# Patient Record
Sex: FEMALE | Race: WHITE | NOT HISPANIC OR LATINO | Employment: FULL TIME | URBAN - METROPOLITAN AREA
[De-identification: names, ages, dates, MRNs, and addresses within clinical notes are randomized per-mention and may not be internally consistent; named-entity substitution may affect disease eponyms.]

---

## 2017-07-21 ENCOUNTER — APPOINTMENT (OUTPATIENT)
Dept: WOMENS IMAGING | Facility: HOSPITAL | Age: 52
End: 2017-07-21

## 2017-07-21 PROCEDURE — 77067 SCR MAMMO BI INCL CAD: CPT | Performed by: RADIOLOGY

## 2017-08-23 ENCOUNTER — HOSPITAL ENCOUNTER (INPATIENT)
Facility: HOSPITAL | Age: 52
LOS: 3 days | Discharge: HOME | End: 2017-08-27
Attending: EMERGENCY MEDICINE | Admitting: INTERNAL MEDICINE

## 2017-08-23 DIAGNOSIS — E10.10 DIABETIC KETOACIDOSIS WITHOUT COMA ASSOCIATED WITH TYPE 1 DIABETES MELLITUS (HCC): Primary | ICD-10-CM

## 2017-08-23 DIAGNOSIS — R11.2 NON-INTRACTABLE VOMITING WITH NAUSEA, UNSPECIFIED VOMITING TYPE: ICD-10-CM

## 2017-08-23 PROCEDURE — 85025 COMPLETE CBC W/AUTO DIFF WBC: CPT | Performed by: EMERGENCY MEDICINE

## 2017-08-23 PROCEDURE — 83036 HEMOGLOBIN GLYCOSYLATED A1C: CPT | Performed by: INTERNAL MEDICINE

## 2017-08-23 PROCEDURE — 82010 KETONE BODYS QUAN: CPT | Performed by: EMERGENCY MEDICINE

## 2017-08-23 PROCEDURE — 25010000002 ONDANSETRON PER 1 MG: Performed by: EMERGENCY MEDICINE

## 2017-08-23 PROCEDURE — 81001 URINALYSIS AUTO W/SCOPE: CPT | Performed by: EMERGENCY MEDICINE

## 2017-08-23 PROCEDURE — 80053 COMPREHEN METABOLIC PANEL: CPT | Performed by: EMERGENCY MEDICINE

## 2017-08-23 PROCEDURE — 80307 DRUG TEST PRSMV CHEM ANLYZR: CPT | Performed by: EMERGENCY MEDICINE

## 2017-08-23 PROCEDURE — 83690 ASSAY OF LIPASE: CPT | Performed by: EMERGENCY MEDICINE

## 2017-08-23 PROCEDURE — 99285 EMERGENCY DEPT VISIT HI MDM: CPT

## 2017-08-23 RX ORDER — AMITRIPTYLINE HYDROCHLORIDE 25 MG/1
50 TABLET, FILM COATED ORAL NIGHTLY
COMMUNITY
End: 2017-12-14

## 2017-08-23 RX ORDER — SODIUM CHLORIDE 0.9 % (FLUSH) 0.9 %
10 SYRINGE (ML) INJECTION AS NEEDED
Status: DISCONTINUED | OUTPATIENT
Start: 2017-08-23 | End: 2017-08-27 | Stop reason: HOSPADM

## 2017-08-23 RX ORDER — ONDANSETRON 2 MG/ML
4 INJECTION INTRAMUSCULAR; INTRAVENOUS ONCE
Status: COMPLETED | OUTPATIENT
Start: 2017-08-23 | End: 2017-08-23

## 2017-08-23 RX ADMIN — ONDANSETRON 4 MG: 2 INJECTION INTRAMUSCULAR; INTRAVENOUS at 23:50

## 2017-08-23 RX ADMIN — SODIUM CHLORIDE 1905 ML: 9 INJECTION, SOLUTION INTRAVENOUS at 23:50

## 2017-08-24 ENCOUNTER — APPOINTMENT (OUTPATIENT)
Dept: CT IMAGING | Facility: HOSPITAL | Age: 52
End: 2017-08-24

## 2017-08-24 PROBLEM — E10.10 DIABETIC KETOACIDOSIS WITHOUT COMA ASSOCIATED WITH TYPE 1 DIABETES MELLITUS (HCC): Status: ACTIVE | Noted: 2017-08-24

## 2017-08-24 LAB
ALBUMIN SERPL-MCNC: 4.7 G/DL (ref 3.5–5.2)
ALBUMIN/GLOB SERPL: 1.5 G/DL
ALP SERPL-CCNC: 163 U/L (ref 39–117)
ALT SERPL W P-5'-P-CCNC: 16 U/L (ref 1–33)
ANION GAP SERPL CALCULATED.3IONS-SCNC: 11.3 MMOL/L
ANION GAP SERPL CALCULATED.3IONS-SCNC: 12 MMOL/L
ANION GAP SERPL CALCULATED.3IONS-SCNC: 17.9 MMOL/L
ANION GAP SERPL CALCULATED.3IONS-SCNC: 31 MMOL/L
ANION GAP SERPL CALCULATED.3IONS-SCNC: 37 MMOL/L
ARTERIAL PATENCY WRIST A: ABNORMAL
AST SERPL-CCNC: 17 U/L (ref 1–32)
ATMOSPHERIC PRESS: 751.4 MMHG
B-OH-BUTYR SERPL-SCNC: 7.97 MMOL/L (ref 0.02–0.27)
BACTERIA UR QL AUTO: NORMAL /HPF
BASE EXCESS BLDA CALC-SCNC: -14.3 MMOL/L (ref 0–2)
BASOPHILS # BLD AUTO: 0.01 10*3/MM3 (ref 0–0.2)
BASOPHILS NFR BLD AUTO: 0.1 % (ref 0–1.5)
BDY SITE: ABNORMAL
BILIRUB SERPL-MCNC: 0.8 MG/DL (ref 0.1–1.2)
BILIRUB UR QL STRIP: NEGATIVE
BUN BLD-MCNC: 13 MG/DL (ref 6–20)
BUN BLD-MCNC: 14 MG/DL (ref 6–20)
BUN BLD-MCNC: 17 MG/DL (ref 6–20)
BUN BLD-MCNC: 23 MG/DL (ref 6–20)
BUN BLD-MCNC: 29 MG/DL (ref 6–20)
BUN/CREAT SERPL: 15.7 (ref 7–25)
BUN/CREAT SERPL: 16.9 (ref 7–25)
BUN/CREAT SERPL: 19.3 (ref 7–25)
BUN/CREAT SERPL: 21.3 (ref 7–25)
BUN/CREAT SERPL: 21.5 (ref 7–25)
CA-I BLD-MCNC: 5.1 MG/DL (ref 4.6–5.4)
CA-I BLD-MCNC: 5.2 MG/DL (ref 4.6–5.4)
CA-I BLD-MCNC: 5.2 MG/DL (ref 4.6–5.4)
CA-I BLD-MCNC: 5.3 MG/DL (ref 4.6–5.4)
CA-I SERPL ISE-MCNC: 1.28 MMOL/L (ref 1.1–1.35)
CA-I SERPL ISE-MCNC: 1.29 MMOL/L (ref 1.1–1.35)
CA-I SERPL ISE-MCNC: 1.3 MMOL/L (ref 1.1–1.35)
CA-I SERPL ISE-MCNC: 1.33 MMOL/L (ref 1.1–1.35)
CALCIUM SPEC-SCNC: 10.6 MG/DL (ref 8.6–10.5)
CALCIUM SPEC-SCNC: 8.5 MG/DL (ref 8.6–10.5)
CALCIUM SPEC-SCNC: 8.5 MG/DL (ref 8.6–10.5)
CALCIUM SPEC-SCNC: 8.6 MG/DL (ref 8.6–10.5)
CALCIUM SPEC-SCNC: 8.9 MG/DL (ref 8.6–10.5)
CHLORIDE SERPL-SCNC: 100 MMOL/L (ref 98–107)
CHLORIDE SERPL-SCNC: 83 MMOL/L (ref 98–107)
CHLORIDE SERPL-SCNC: 95 MMOL/L (ref 98–107)
CHLORIDE SERPL-SCNC: 98 MMOL/L (ref 98–107)
CHLORIDE SERPL-SCNC: 99 MMOL/L (ref 98–107)
CLARITY UR: CLEAR
CO2 SERPL-SCNC: 12 MMOL/L (ref 22–29)
CO2 SERPL-SCNC: 15.1 MMOL/L (ref 22–29)
CO2 SERPL-SCNC: 18.7 MMOL/L (ref 22–29)
CO2 SERPL-SCNC: 19 MMOL/L (ref 22–29)
CO2 SERPL-SCNC: 8 MMOL/L (ref 22–29)
COLOR UR: YELLOW
CREAT BLD-MCNC: 0.83 MG/DL (ref 0.57–1)
CREAT BLD-MCNC: 0.83 MG/DL (ref 0.57–1)
CREAT BLD-MCNC: 0.88 MG/DL (ref 0.57–1)
CREAT BLD-MCNC: 1.08 MG/DL (ref 0.57–1)
CREAT BLD-MCNC: 1.35 MG/DL (ref 0.57–1)
DEPRECATED RDW RBC AUTO: 45.8 FL (ref 37–54)
EOSINOPHIL # BLD AUTO: 0 10*3/MM3 (ref 0–0.7)
EOSINOPHIL NFR BLD AUTO: 0 % (ref 0.3–6.2)
ERYTHROCYTE [DISTWIDTH] IN BLOOD BY AUTOMATED COUNT: 12.9 % (ref 11.7–13)
ETHANOL BLD-MCNC: <10 MG/DL (ref 0–10)
ETHANOL UR QL: <0.01 %
GFR SERPL CREATININE-BSD FRML MDRD: 41 ML/MIN/1.73
GFR SERPL CREATININE-BSD FRML MDRD: 53 ML/MIN/1.73
GFR SERPL CREATININE-BSD FRML MDRD: 67 ML/MIN/1.73
GFR SERPL CREATININE-BSD FRML MDRD: 72 ML/MIN/1.73
GFR SERPL CREATININE-BSD FRML MDRD: 72 ML/MIN/1.73
GGT SERPL-CCNC: 10 U/L (ref 5–36)
GLOBULIN UR ELPH-MCNC: 3.2 GM/DL
GLUCOSE BLD-MCNC: 178 MG/DL (ref 65–99)
GLUCOSE BLD-MCNC: 216 MG/DL (ref 65–99)
GLUCOSE BLD-MCNC: 270 MG/DL (ref 65–99)
GLUCOSE BLD-MCNC: 428 MG/DL (ref 65–99)
GLUCOSE BLD-MCNC: 678 MG/DL (ref 65–99)
GLUCOSE BLDC GLUCOMTR-MCNC: 176 MG/DL (ref 70–130)
GLUCOSE BLDC GLUCOMTR-MCNC: 185 MG/DL (ref 70–130)
GLUCOSE BLDC GLUCOMTR-MCNC: 190 MG/DL (ref 70–130)
GLUCOSE BLDC GLUCOMTR-MCNC: 194 MG/DL (ref 70–130)
GLUCOSE BLDC GLUCOMTR-MCNC: 195 MG/DL (ref 70–130)
GLUCOSE BLDC GLUCOMTR-MCNC: 206 MG/DL (ref 70–130)
GLUCOSE BLDC GLUCOMTR-MCNC: 214 MG/DL (ref 70–130)
GLUCOSE BLDC GLUCOMTR-MCNC: 238 MG/DL (ref 70–130)
GLUCOSE BLDC GLUCOMTR-MCNC: 241 MG/DL (ref 70–130)
GLUCOSE BLDC GLUCOMTR-MCNC: 257 MG/DL (ref 70–130)
GLUCOSE BLDC GLUCOMTR-MCNC: 297 MG/DL (ref 70–130)
GLUCOSE BLDC GLUCOMTR-MCNC: 316 MG/DL (ref 70–130)
GLUCOSE BLDC GLUCOMTR-MCNC: 320 MG/DL (ref 70–130)
GLUCOSE BLDC GLUCOMTR-MCNC: 488 MG/DL (ref 70–130)
GLUCOSE BLDC GLUCOMTR-MCNC: 489 MG/DL (ref 70–130)
GLUCOSE BLDC GLUCOMTR-MCNC: 592 MG/DL (ref 70–130)
GLUCOSE UR STRIP-MCNC: ABNORMAL MG/DL
HBA1C MFR BLD: 8.39 % (ref 4.8–5.6)
HCO3 BLDA-SCNC: 11.2 MMOL/L (ref 22–28)
HCT VFR BLD AUTO: 40.3 % (ref 35.6–45.5)
HGB BLD-MCNC: 13 G/DL (ref 11.9–15.5)
HGB UR QL STRIP.AUTO: ABNORMAL
HOLD SPECIMEN: NORMAL
HOLD SPECIMEN: NORMAL
HYALINE CASTS UR QL AUTO: NORMAL /LPF
IMM GRANULOCYTES # BLD: 0.06 10*3/MM3 (ref 0–0.03)
IMM GRANULOCYTES NFR BLD: 0.4 % (ref 0–0.5)
KETONES UR QL STRIP: ABNORMAL
LEUKOCYTE ESTERASE UR QL STRIP.AUTO: NEGATIVE
LIPASE SERPL-CCNC: 12 U/L (ref 13–60)
LYMPHOCYTES # BLD AUTO: 0.61 10*3/MM3 (ref 0.9–4.8)
LYMPHOCYTES NFR BLD AUTO: 3.6 % (ref 19.6–45.3)
MAGNESIUM SERPL-MCNC: 2.1 MG/DL (ref 1.6–2.6)
MAGNESIUM SERPL-MCNC: 2.1 MG/DL (ref 1.6–2.6)
MAGNESIUM SERPL-MCNC: 2.2 MG/DL (ref 1.6–2.6)
MAGNESIUM SERPL-MCNC: 2.3 MG/DL (ref 1.6–2.6)
MCH RBC QN AUTO: 31.4 PG (ref 26.9–32)
MCHC RBC AUTO-ENTMCNC: 32.3 G/DL (ref 32.4–36.3)
MCV RBC AUTO: 97.3 FL (ref 80.5–98.2)
MODALITY: ABNORMAL
MONOCYTES # BLD AUTO: 0.51 10*3/MM3 (ref 0.2–1.2)
MONOCYTES NFR BLD AUTO: 3 % (ref 5–12)
NEUTROPHILS # BLD AUTO: 15.86 10*3/MM3 (ref 1.9–8.1)
NEUTROPHILS NFR BLD AUTO: 92.9 % (ref 42.7–76)
NITRITE UR QL STRIP: NEGATIVE
NRBC BLD MANUAL-RTO: 0 /100 WBC (ref 0–0)
PCO2 BLDA: 25.3 MM HG (ref 35–45)
PH BLDA: 7.25 PH UNITS (ref 7.35–7.45)
PH UR STRIP.AUTO: <=5 [PH] (ref 5–8)
PHOSPHATE SERPL-MCNC: 1.7 MG/DL (ref 2.5–4.5)
PHOSPHATE SERPL-MCNC: 2.1 MG/DL (ref 2.5–4.5)
PHOSPHATE SERPL-MCNC: 2.1 MG/DL (ref 2.5–4.5)
PHOSPHATE SERPL-MCNC: 3.8 MG/DL (ref 2.5–4.5)
PLATELET # BLD AUTO: 340 10*3/MM3 (ref 140–500)
PMV BLD AUTO: 12.3 FL (ref 6–12)
PO2 BLDA: 108 MM HG (ref 80–100)
POTASSIUM BLD-SCNC: 4.3 MMOL/L (ref 3.5–5.2)
POTASSIUM BLD-SCNC: 4.3 MMOL/L (ref 3.5–5.2)
POTASSIUM BLD-SCNC: 4.4 MMOL/L (ref 3.5–5.2)
POTASSIUM BLD-SCNC: 4.5 MMOL/L (ref 3.5–5.2)
POTASSIUM BLD-SCNC: 4.9 MMOL/L (ref 3.5–5.2)
PROT SERPL-MCNC: 7.9 G/DL (ref 6–8.5)
PROT UR QL STRIP: NEGATIVE
RBC # BLD AUTO: 4.14 10*6/MM3 (ref 3.9–5.2)
RBC # UR: NORMAL /HPF
REF LAB TEST METHOD: NORMAL
SAO2 % BLDCOA: 97.4 % (ref 92–99)
SET MECH RESP RATE: 18
SODIUM BLD-SCNC: 129 MMOL/L (ref 136–145)
SODIUM BLD-SCNC: 130 MMOL/L (ref 136–145)
SODIUM BLD-SCNC: 132 MMOL/L (ref 136–145)
SODIUM BLD-SCNC: 132 MMOL/L (ref 136–145)
SODIUM BLD-SCNC: 134 MMOL/L (ref 136–145)
SP GR UR STRIP: 1.03 (ref 1–1.03)
SQUAMOUS #/AREA URNS HPF: NORMAL /HPF
T4 FREE SERPL-MCNC: 0.79 NG/DL (ref 0.93–1.7)
TOTAL RATE: 18 BREATHS/MINUTE
TSH SERPL DL<=0.05 MIU/L-ACNC: 2.9 MIU/ML (ref 0.27–4.2)
UROBILINOGEN UR QL STRIP: ABNORMAL
WBC NRBC COR # BLD: 17.05 10*3/MM3 (ref 4.5–10.7)
WBC UR QL AUTO: NORMAL /HPF
WHOLE BLOOD HOLD SPECIMEN: NORMAL
WHOLE BLOOD HOLD SPECIMEN: NORMAL

## 2017-08-24 PROCEDURE — 0 IOPAMIDOL 61 % SOLUTION: Performed by: EMERGENCY MEDICINE

## 2017-08-24 PROCEDURE — 25810000003 POTASSIUM CHLORIDE PER 2 MEQ: Performed by: EMERGENCY MEDICINE

## 2017-08-24 PROCEDURE — 36600 WITHDRAWAL OF ARTERIAL BLOOD: CPT

## 2017-08-24 PROCEDURE — 84100 ASSAY OF PHOSPHORUS: CPT | Performed by: EMERGENCY MEDICINE

## 2017-08-24 PROCEDURE — 63710000001 INSULIN ASPART PER 5 UNITS: Performed by: INTERNAL MEDICINE

## 2017-08-24 PROCEDURE — 82803 BLOOD GASES ANY COMBINATION: CPT

## 2017-08-24 PROCEDURE — 74177 CT ABD & PELVIS W/CONTRAST: CPT

## 2017-08-24 PROCEDURE — 63710000001 INSULIN DETEMER PER 5 UNITS: Performed by: INTERNAL MEDICINE

## 2017-08-24 PROCEDURE — 36415 COLL VENOUS BLD VENIPUNCTURE: CPT | Performed by: EMERGENCY MEDICINE

## 2017-08-24 PROCEDURE — 82977 ASSAY OF GGT: CPT | Performed by: INTERNAL MEDICINE

## 2017-08-24 PROCEDURE — 84439 ASSAY OF FREE THYROXINE: CPT | Performed by: INTERNAL MEDICINE

## 2017-08-24 PROCEDURE — 82962 GLUCOSE BLOOD TEST: CPT

## 2017-08-24 PROCEDURE — 99254 IP/OBS CNSLTJ NEW/EST MOD 60: CPT | Performed by: INTERNAL MEDICINE

## 2017-08-24 PROCEDURE — 63710000001 INSULIN REGULAR HUMAN PER 5 UNITS: Performed by: EMERGENCY MEDICINE

## 2017-08-24 PROCEDURE — 80048 BASIC METABOLIC PNL TOTAL CA: CPT | Performed by: EMERGENCY MEDICINE

## 2017-08-24 PROCEDURE — 82330 ASSAY OF CALCIUM: CPT | Performed by: EMERGENCY MEDICINE

## 2017-08-24 PROCEDURE — 83735 ASSAY OF MAGNESIUM: CPT | Performed by: EMERGENCY MEDICINE

## 2017-08-24 PROCEDURE — 84443 ASSAY THYROID STIM HORMONE: CPT | Performed by: INTERNAL MEDICINE

## 2017-08-24 RX ORDER — POTASSIUM CHLORIDE 7.46 G/1000ML
10 INJECTION, SOLUTION INTRAVENOUS
Status: DISCONTINUED | OUTPATIENT
Start: 2017-08-24 | End: 2017-08-27 | Stop reason: HOSPADM

## 2017-08-24 RX ORDER — NICOTINE POLACRILEX 4 MG
15 LOZENGE BUCCAL
Status: DISCONTINUED | OUTPATIENT
Start: 2017-08-24 | End: 2017-08-27 | Stop reason: HOSPADM

## 2017-08-24 RX ORDER — DEXTROSE MONOHYDRATE 25 G/50ML
25 INJECTION, SOLUTION INTRAVENOUS
Status: DISCONTINUED | OUTPATIENT
Start: 2017-08-24 | End: 2017-08-27 | Stop reason: HOSPADM

## 2017-08-24 RX ORDER — ACETAMINOPHEN 325 MG/1
650 TABLET ORAL EVERY 6 HOURS PRN
Status: DISCONTINUED | OUTPATIENT
Start: 2017-08-24 | End: 2017-08-27 | Stop reason: HOSPADM

## 2017-08-24 RX ORDER — POTASSIUM CHLORIDE 1.5 G/1.77G
20 POWDER, FOR SOLUTION ORAL AS NEEDED
Status: DISCONTINUED | OUTPATIENT
Start: 2017-08-24 | End: 2017-08-24 | Stop reason: CLARIF

## 2017-08-24 RX ORDER — POTASSIUM CHLORIDE 1.5 G/1.77G
10 POWDER, FOR SOLUTION ORAL AS NEEDED
Status: DISCONTINUED | OUTPATIENT
Start: 2017-08-24 | End: 2017-08-24 | Stop reason: CLARIF

## 2017-08-24 RX ORDER — AMITRIPTYLINE HYDROCHLORIDE 50 MG/1
50 TABLET, FILM COATED ORAL NIGHTLY
Status: DISCONTINUED | OUTPATIENT
Start: 2017-08-24 | End: 2017-08-27 | Stop reason: HOSPADM

## 2017-08-24 RX ORDER — AMITRIPTYLINE HYDROCHLORIDE 100 MG/1
100 TABLET, FILM COATED ORAL NIGHTLY PRN
Status: DISCONTINUED | OUTPATIENT
Start: 2017-08-24 | End: 2017-08-24 | Stop reason: DRUGHIGH

## 2017-08-24 RX ORDER — SODIUM CHLORIDE 9 MG/ML
125 INJECTION, SOLUTION INTRAVENOUS CONTINUOUS
Status: DISCONTINUED | OUTPATIENT
Start: 2017-08-24 | End: 2017-08-24

## 2017-08-24 RX ORDER — POTASSIUM CHLORIDE 750 MG/1
40 CAPSULE, EXTENDED RELEASE ORAL AS NEEDED
Status: DISCONTINUED | OUTPATIENT
Start: 2017-08-24 | End: 2017-08-27 | Stop reason: HOSPADM

## 2017-08-24 RX ORDER — SODIUM CHLORIDE 450 MG/100ML
250 INJECTION, SOLUTION INTRAVENOUS CONTINUOUS
Status: DISCONTINUED | OUTPATIENT
Start: 2017-08-24 | End: 2017-08-24

## 2017-08-24 RX ORDER — DEXTROSE, SODIUM CHLORIDE, AND POTASSIUM CHLORIDE 5; .45; .15 G/100ML; G/100ML; G/100ML
150 INJECTION INTRAVENOUS CONTINUOUS PRN
Status: DISCONTINUED | OUTPATIENT
Start: 2017-08-24 | End: 2017-08-24

## 2017-08-24 RX ORDER — THIAMINE MONONITRATE (VIT B1) 100 MG
100 TABLET ORAL DAILY
Status: DISCONTINUED | OUTPATIENT
Start: 2017-08-24 | End: 2017-08-27 | Stop reason: HOSPADM

## 2017-08-24 RX ORDER — DEXTROSE MONOHYDRATE 25 G/50ML
12.5 INJECTION, SOLUTION INTRAVENOUS
Status: DISCONTINUED | OUTPATIENT
Start: 2017-08-24 | End: 2017-08-27 | Stop reason: HOSPADM

## 2017-08-24 RX ORDER — POTASSIUM CHLORIDE 750 MG/1
20 CAPSULE, EXTENDED RELEASE ORAL AS NEEDED
Status: DISCONTINUED | OUTPATIENT
Start: 2017-08-24 | End: 2017-08-27 | Stop reason: HOSPADM

## 2017-08-24 RX ORDER — POTASSIUM CHLORIDE 1.5 G/1.77G
40 POWDER, FOR SOLUTION ORAL AS NEEDED
Status: DISCONTINUED | OUTPATIENT
Start: 2017-08-24 | End: 2017-08-24 | Stop reason: CLARIF

## 2017-08-24 RX ORDER — POTASSIUM CHLORIDE 750 MG/1
10 CAPSULE, EXTENDED RELEASE ORAL AS NEEDED
Status: DISCONTINUED | OUTPATIENT
Start: 2017-08-24 | End: 2017-08-27 | Stop reason: HOSPADM

## 2017-08-24 RX ORDER — SODIUM CHLORIDE AND POTASSIUM CHLORIDE 150; 450 MG/100ML; MG/100ML
250 INJECTION, SOLUTION INTRAVENOUS CONTINUOUS PRN
Status: DISCONTINUED | OUTPATIENT
Start: 2017-08-24 | End: 2017-08-24

## 2017-08-24 RX ORDER — DULOXETIN HYDROCHLORIDE 60 MG/1
60 CAPSULE, DELAYED RELEASE ORAL DAILY
Status: DISCONTINUED | OUTPATIENT
Start: 2017-08-24 | End: 2017-08-27 | Stop reason: HOSPADM

## 2017-08-24 RX ORDER — DEXTROSE AND SODIUM CHLORIDE 5; .45 G/100ML; G/100ML
150 INJECTION, SOLUTION INTRAVENOUS CONTINUOUS PRN
Status: DISCONTINUED | OUTPATIENT
Start: 2017-08-24 | End: 2017-08-24

## 2017-08-24 RX ADMIN — INSULIN ASPART 17 UNITS: 100 INJECTION, SOLUTION INTRAVENOUS; SUBCUTANEOUS at 20:53

## 2017-08-24 RX ADMIN — ACETAMINOPHEN 650 MG: 325 TABLET ORAL at 10:58

## 2017-08-24 RX ADMIN — Medication 100 MG: at 09:42

## 2017-08-24 RX ADMIN — INSULIN ASPART 2 UNITS: 100 INJECTION, SOLUTION INTRAVENOUS; SUBCUTANEOUS at 17:15

## 2017-08-24 RX ADMIN — POTASSIUM CHLORIDE AND SODIUM CHLORIDE 250 ML/HR: 450; 150 INJECTION, SOLUTION INTRAVENOUS at 05:05

## 2017-08-24 RX ADMIN — INSULIN DETEMIR 30 UNITS: 100 INJECTION, SOLUTION SUBCUTANEOUS at 15:31

## 2017-08-24 RX ADMIN — POTASSIUM CHLORIDE AND SODIUM CHLORIDE 250 ML/HR: 450; 150 INJECTION, SOLUTION INTRAVENOUS at 08:34

## 2017-08-24 RX ADMIN — SODIUM CHLORIDE 125 ML/HR: 9 INJECTION, SOLUTION INTRAVENOUS at 02:06

## 2017-08-24 RX ADMIN — DULOXETINE HYDROCHLORIDE 60 MG: 60 CAPSULE, DELAYED RELEASE ORAL at 17:15

## 2017-08-24 RX ADMIN — IOPAMIDOL 85 ML: 612 INJECTION, SOLUTION INTRAVENOUS at 01:16

## 2017-08-24 RX ADMIN — POTASSIUM CHLORIDE, DEXTROSE MONOHYDRATE AND SODIUM CHLORIDE 150 ML/HR: 150; 5; 450 INJECTION, SOLUTION INTRAVENOUS at 09:41

## 2017-08-24 RX ADMIN — SODIUM CHLORIDE 1000 ML: 9 INJECTION, SOLUTION INTRAVENOUS at 01:57

## 2017-08-24 RX ADMIN — SODIUM CHLORIDE 0.1 UNITS/KG/HR: 9 INJECTION, SOLUTION INTRAVENOUS at 01:55

## 2017-08-25 PROBLEM — K76.0 FATTY INFILTRATION OF LIVER: Status: ACTIVE | Noted: 2017-08-25

## 2017-08-25 PROBLEM — Z96.41 INSULIN PUMP STATUS: Status: ACTIVE | Noted: 2017-08-25

## 2017-08-25 LAB
ANION GAP SERPL CALCULATED.3IONS-SCNC: 11.5 MMOL/L
BUN BLD-MCNC: 10 MG/DL (ref 6–20)
BUN/CREAT SERPL: 14.1 (ref 7–25)
CALCIUM SPEC-SCNC: 8.5 MG/DL (ref 8.6–10.5)
CHLORIDE SERPL-SCNC: 106 MMOL/L (ref 98–107)
CHOLEST SERPL-MCNC: 190 MG/DL (ref 0–200)
CO2 SERPL-SCNC: 23.5 MMOL/L (ref 22–29)
CREAT BLD-MCNC: 0.71 MG/DL (ref 0.57–1)
GFR SERPL CREATININE-BSD FRML MDRD: 86 ML/MIN/1.73
GLUCOSE BLD-MCNC: 70 MG/DL (ref 65–99)
GLUCOSE BLDC GLUCOMTR-MCNC: 110 MG/DL (ref 70–130)
GLUCOSE BLDC GLUCOMTR-MCNC: 188 MG/DL (ref 70–130)
GLUCOSE BLDC GLUCOMTR-MCNC: 196 MG/DL (ref 70–130)
GLUCOSE BLDC GLUCOMTR-MCNC: 56 MG/DL (ref 70–130)
HDLC SERPL-MCNC: 65 MG/DL (ref 40–60)
LDLC SERPL CALC-MCNC: 97 MG/DL (ref 0–100)
LDLC/HDLC SERPL: 1.5 {RATIO}
PHOSPHATE SERPL-MCNC: 1.9 MG/DL (ref 2.5–4.5)
POTASSIUM BLD-SCNC: 4.1 MMOL/L (ref 3.5–5.2)
POTASSIUM BLD-SCNC: 4.2 MMOL/L (ref 3.5–5.2)
SODIUM BLD-SCNC: 141 MMOL/L (ref 136–145)
TRIGL SERPL-MCNC: 139 MG/DL (ref 0–150)
VLDLC SERPL-MCNC: 27.8 MG/DL (ref 5–40)

## 2017-08-25 PROCEDURE — 87340 HEPATITIS B SURFACE AG IA: CPT | Performed by: INTERNAL MEDICINE

## 2017-08-25 PROCEDURE — 82962 GLUCOSE BLOOD TEST: CPT

## 2017-08-25 PROCEDURE — 86705 HEP B CORE ANTIBODY IGM: CPT | Performed by: INTERNAL MEDICINE

## 2017-08-25 PROCEDURE — 84100 ASSAY OF PHOSPHORUS: CPT | Performed by: INTERNAL MEDICINE

## 2017-08-25 PROCEDURE — 86709 HEPATITIS A IGM ANTIBODY: CPT | Performed by: INTERNAL MEDICINE

## 2017-08-25 PROCEDURE — 84132 ASSAY OF SERUM POTASSIUM: CPT | Performed by: INTERNAL MEDICINE

## 2017-08-25 PROCEDURE — 99232 SBSQ HOSP IP/OBS MODERATE 35: CPT | Performed by: INTERNAL MEDICINE

## 2017-08-25 PROCEDURE — 80061 LIPID PANEL: CPT | Performed by: INTERNAL MEDICINE

## 2017-08-25 PROCEDURE — 80048 BASIC METABOLIC PNL TOTAL CA: CPT | Performed by: INTERNAL MEDICINE

## 2017-08-25 RX ORDER — LOSARTAN POTASSIUM 50 MG/1
100 TABLET ORAL
Status: DISCONTINUED | OUTPATIENT
Start: 2017-08-25 | End: 2017-08-27 | Stop reason: HOSPADM

## 2017-08-25 RX ORDER — HYDROCHLOROTHIAZIDE 25 MG/1
12.5 TABLET ORAL DAILY
Status: DISCONTINUED | OUTPATIENT
Start: 2017-08-25 | End: 2017-08-27 | Stop reason: HOSPADM

## 2017-08-25 RX ADMIN — LOSARTAN POTASSIUM 100 MG: 50 TABLET, FILM COATED ORAL at 22:18

## 2017-08-25 RX ADMIN — ACETAMINOPHEN 650 MG: 325 TABLET ORAL at 10:42

## 2017-08-25 RX ADMIN — DULOXETINE HYDROCHLORIDE 60 MG: 60 CAPSULE, DELAYED RELEASE ORAL at 07:56

## 2017-08-25 RX ADMIN — Medication 100 MG: at 07:55

## 2017-08-25 RX ADMIN — AMITRIPTYLINE HYDROCHLORIDE 50 MG: 50 TABLET, FILM COATED ORAL at 22:19

## 2017-08-25 RX ADMIN — HYDROCHLOROTHIAZIDE 12.5 MG: 25 TABLET ORAL at 22:18

## 2017-08-25 RX ADMIN — POTASSIUM CHLORIDE 10 MEQ: 750 CAPSULE, EXTENDED RELEASE ORAL at 09:22

## 2017-08-26 LAB
GLUCOSE BLDC GLUCOMTR-MCNC: 182 MG/DL (ref 70–130)
GLUCOSE BLDC GLUCOMTR-MCNC: 185 MG/DL (ref 70–130)
GLUCOSE BLDC GLUCOMTR-MCNC: 230 MG/DL (ref 70–130)
HAV IGM SERPL QL IA: NEGATIVE
HBV CORE IGM SERPL QL IA: NEGATIVE
HBV SURFACE AG SERPL QL IA: NEGATIVE
POTASSIUM BLD-SCNC: 4.4 MMOL/L (ref 3.5–5.2)

## 2017-08-26 PROCEDURE — 82962 GLUCOSE BLOOD TEST: CPT

## 2017-08-26 PROCEDURE — 84132 ASSAY OF SERUM POTASSIUM: CPT | Performed by: INTERNAL MEDICINE

## 2017-08-26 PROCEDURE — 99232 SBSQ HOSP IP/OBS MODERATE 35: CPT | Performed by: INTERNAL MEDICINE

## 2017-08-26 RX ADMIN — AMITRIPTYLINE HYDROCHLORIDE 50 MG: 50 TABLET, FILM COATED ORAL at 22:39

## 2017-08-26 RX ADMIN — Medication 100 MG: at 10:02

## 2017-08-26 RX ADMIN — DULOXETINE HYDROCHLORIDE 60 MG: 60 CAPSULE, DELAYED RELEASE ORAL at 10:02

## 2017-08-27 VITALS
RESPIRATION RATE: 18 BRPM | SYSTOLIC BLOOD PRESSURE: 129 MMHG | TEMPERATURE: 98 F | HEIGHT: 65 IN | DIASTOLIC BLOOD PRESSURE: 93 MMHG | BODY MASS INDEX: 23.32 KG/M2 | WEIGHT: 139.99 LBS | OXYGEN SATURATION: 98 % | HEART RATE: 80 BPM

## 2017-08-27 LAB
GLUCOSE BLDC GLUCOMTR-MCNC: 137 MG/DL (ref 70–130)
GLUCOSE BLDC GLUCOMTR-MCNC: 221 MG/DL (ref 70–130)

## 2017-08-27 PROCEDURE — 82962 GLUCOSE BLOOD TEST: CPT

## 2017-08-27 PROCEDURE — 99232 SBSQ HOSP IP/OBS MODERATE 35: CPT | Performed by: INTERNAL MEDICINE

## 2017-08-27 RX ADMIN — HYDROCHLOROTHIAZIDE 12.5 MG: 25 TABLET ORAL at 08:41

## 2017-08-27 RX ADMIN — Medication 100 MG: at 08:41

## 2017-08-27 RX ADMIN — LOSARTAN POTASSIUM 100 MG: 50 TABLET, FILM COATED ORAL at 08:41

## 2017-08-27 RX ADMIN — DULOXETINE HYDROCHLORIDE 60 MG: 60 CAPSULE, DELAYED RELEASE ORAL at 08:41

## 2017-08-27 RX ADMIN — ACETAMINOPHEN 650 MG: 325 TABLET ORAL at 08:41

## 2017-12-14 ENCOUNTER — OFFICE VISIT (OUTPATIENT)
Dept: ENDOCRINOLOGY | Age: 52
End: 2017-12-14

## 2017-12-14 VITALS
WEIGHT: 144 LBS | HEIGHT: 65 IN | SYSTOLIC BLOOD PRESSURE: 140 MMHG | HEART RATE: 94 BPM | DIASTOLIC BLOOD PRESSURE: 92 MMHG | BODY MASS INDEX: 23.99 KG/M2

## 2017-12-14 DIAGNOSIS — E10.65 TYPE 1 DIABETES MELLITUS WITH HYPERGLYCEMIA (HCC): Primary | ICD-10-CM

## 2017-12-14 DIAGNOSIS — I10 ESSENTIAL HYPERTENSION: ICD-10-CM

## 2017-12-14 DIAGNOSIS — F41.9 ANXIETY: ICD-10-CM

## 2017-12-14 DIAGNOSIS — Z46.81 INSULIN PUMP TITRATION: ICD-10-CM

## 2017-12-14 PROCEDURE — 99215 OFFICE O/P EST HI 40 MIN: CPT | Performed by: INTERNAL MEDICINE

## 2017-12-14 RX ORDER — ZOLPIDEM TARTRATE 12.5 MG/1
12.5 TABLET, FILM COATED, EXTENDED RELEASE ORAL NIGHTLY PRN
Qty: 30 TABLET | Refills: 0 | Status: SHIPPED | OUTPATIENT
Start: 2017-12-14 | End: 2018-05-03 | Stop reason: SDUPTHER

## 2017-12-14 RX ORDER — LOSARTAN POTASSIUM AND HYDROCHLOROTHIAZIDE 12.5; 1 MG/1; MG/1
TABLET ORAL
Qty: 30 TABLET | Refills: 2 | Status: SHIPPED | OUTPATIENT
Start: 2017-12-14 | End: 2017-12-14 | Stop reason: SDUPTHER

## 2017-12-14 RX ORDER — DULOXETIN HYDROCHLORIDE 60 MG/1
60 CAPSULE, DELAYED RELEASE ORAL DAILY
Qty: 30 CAPSULE | Refills: 0 | Status: SHIPPED | OUTPATIENT
Start: 2017-12-14 | End: 2018-07-19

## 2017-12-14 RX ORDER — LOSARTAN POTASSIUM AND HYDROCHLOROTHIAZIDE 12.5; 1 MG/1; MG/1
TABLET ORAL
Qty: 30 TABLET | Refills: 0 | Status: SHIPPED | OUTPATIENT
Start: 2017-12-14 | End: 2018-05-03 | Stop reason: SDUPTHER

## 2017-12-14 NOTE — PROGRESS NOTES
52 y.o.    Patient Care Team:  Geo Fisher MD as PCP - General (Family Medicine)    Chief Complaint:      HOSPITAL F/U FOR TYPE 1 DIABETES  Subjective     HPI   Ivon CORRALES Jordan,52 y.o. WF is here as follow up for the management of type 1 dm. Pt used to see Dr. Rollins prior.     Type 1 dm - Diagnosed about 11 years ago. Insulin was started about 10 years ago for that 1 year she has been on oral hypoglycemic agents. Currently on omnipod since June 2017 and DEXCOM also since around that time but stopped due to no supplies since aug 2017.   Checks BG 3 - 6 times a day.   FBG - 112 - 200  mg/dl and Pre - meals - 100 - 200   Mg/dl, reports extreme variability with her sugars.   No increased urination or increased thirst.   Pt did have 5 - 6 times BG less than 60 mg/dl (around 25 mg/dl, 40 mg/dl) in the last month. She starts feeling low Bg symptoms when her BG is around 40 mg/dl.   She does have decreased hypoglycemic awareness. Highest BG in the last 1 month was -  300 mg/dl.  Pt got her log book for me. No nausea and vomiting.   Up to date with eye exam, last exam was in July 2017  and no dm retinopathy.   No tingling or numbness in her b/l feet, no ulcers and amputations of her feet.   No CAD, CKD,CVA per pt.   Pt is physically active.weight has been stable.   Pt tries to follow DM diet for most part. Pt is very comfortable counting carbs.   On ARB.  Last DKA was in Aug 2017 and that was her first episode of DKA.     Insulin pump settings  Basal rates  12 AM-8:30 AM-0.75  8:30 AM-p.m.-0.65  4 PM-12 AM-0.55    Bolus settings  12 AM- 10 g of carbs  Blood glucose target 12 AM-140  Sensitivity-60  Active insulin time-3      Interval History      The following portions of the patient's history were reviewed and updated as appropriate: allergies, current medications, past family history, past medical history, past social history, past surgical history and problem list.    Past Medical History:   Diagnosis  "Date   • Diabetes mellitus    • Esophageal candidiasis    • Hypertension    • Insomnia      Family History   Problem Relation Age of Onset   • Adopted: Yes     Social History     Social History   • Marital status:      Spouse name: N/A   • Number of children: N/A   • Years of education: N/A     Occupational History   • Not on file.     Social History Main Topics   • Smoking status: Never Smoker   • Smokeless tobacco: Never Used   • Alcohol use Yes   • Drug use: Not on file   • Sexual activity: Not on file     Other Topics Concern   • Not on file     Social History Narrative     Allergies   Allergen Reactions   • Ampicillin Diarrhea       Current Outpatient Prescriptions:   •  glucose blood test strip, OneTouch Ultra Blue In Vitro Strip; Patient Sig: OneTouch Ultra Blue In Vitro Strip ; 255; 0; 24-Jun-2013; Active, Disp: , Rfl:   •  insulin aspart (NOVOLOG FLEXPEN) 100 UNIT/ML solution pen-injector sc pen, Inject under the skin., Disp: , Rfl:   •  MILK THISTLE PO, Take  by mouth., Disp: , Rfl:   •  DULoxetine (CYMBALTA) 60 MG capsule, Take 1 capsule by mouth Daily., Disp: 30 capsule, Rfl: 0  •  losartan-hydrochlorothiazide (HYZAAR) 100-12.5 MG per tablet, TAKE ONE TABLET BY MOUTH DAILY, Disp: 30 tablet, Rfl: 0  •  zolpidem CR (AMBIEN CR) 12.5 MG CR tablet, Take 1 tablet by mouth At Night As Needed for Sleep. For sleep, Disp: 30 tablet, Rfl: 0        Review of Systems   Constitutional: Positive for fatigue. Negative for chills and fever.   Cardiovascular: Negative for chest pain and palpitations.   Gastrointestinal: Negative for abdominal pain, constipation, diarrhea, nausea and vomiting.   Endocrine: Negative for cold intolerance and heat intolerance.       Objective       Vitals:    12/14/17 0952   BP: 140/92   Pulse: 94   Weight: 65.3 kg (144 lb)   Height: 165.1 cm (65\")     Body mass index is 23.96 kg/(m^2).      Physical Exam   Constitutional: She is oriented to person, place, and time. She appears " well-nourished.   Normal built   HENT:   Head: Normocephalic and atraumatic.   Eyes: Conjunctivae and EOM are normal. No scleral icterus.   Neck: Normal range of motion. Neck supple. No thyromegaly present.   Cardiovascular: Normal rate and normal heart sounds.  Exam reveals no friction rub.    No murmur heard.  Heart rate-80   Pulmonary/Chest: Effort normal and breath sounds normal. No stridor. She has no wheezes. She has no rales.   Abdominal: Soft. Bowel sounds are normal. She exhibits no distension. There is no tenderness.   Insulin pump intact   Musculoskeletal: She exhibits no edema or tenderness.   Lymphadenopathy:     She has no cervical adenopathy.   Neurological: She is alert and oriented to person, place, and time.   Intact pin prick and proprioception   Skin: Skin is warm and dry. She is not diaphoretic.   Psychiatric: She has a normal mood and affect.   Vitals reviewed.    Results Review:     I reviewed the patient's new clinical results.    Medical records reviewed  Summary: done      Admission on 08/23/2017, Discharged on 08/27/2017   Component Date Value Ref Range Status   • Glucose 08/23/2017 678* 65 - 99 mg/dL Final   • BUN 08/23/2017 29* 6 - 20 mg/dL Final   • Creatinine 08/23/2017 1.35* 0.57 - 1.00 mg/dL Final   • Sodium 08/23/2017 132* 136 - 145 mmol/L Final   • Potassium 08/23/2017 4.9  3.5 - 5.2 mmol/L Final   • Chloride 08/23/2017 83* 98 - 107 mmol/L Final   • CO2 08/23/2017 12.0* 22.0 - 29.0 mmol/L Final   • Calcium 08/23/2017 10.6* 8.6 - 10.5 mg/dL Final   • Total Protein 08/23/2017 7.9  6.0 - 8.5 g/dL Final   • Albumin 08/23/2017 4.70  3.50 - 5.20 g/dL Final   • ALT (SGPT) 08/23/2017 16  1 - 33 U/L Final   • AST (SGOT) 08/23/2017 17  1 - 32 U/L Final   • Alkaline Phosphatase 08/23/2017 163* 39 - 117 U/L Final   • Total Bilirubin 08/23/2017 0.8  0.1 - 1.2 mg/dL Final   • eGFR Non  Amer 08/23/2017 41* >60 mL/min/1.73 Final   • Globulin 08/23/2017 3.2  gm/dL Final   • A/G Ratio  08/23/2017 1.5  g/dL Final   • BUN/Creatinine Ratio 08/23/2017 21.5  7.0 - 25.0 Final   • Anion Gap 08/23/2017 37.0  mmol/L Final   • Lipase 08/23/2017 12* 13 - 60 U/L Final   • Color, UA 08/23/2017 Yellow  Yellow, Straw Final   • Appearance, UA 08/23/2017 Clear  Clear Final   • pH, UA 08/23/2017 <=5.0  5.0 - 8.0 Final   • Specific Gravity, UA 08/23/2017 1.029  1.005 - 1.030 Final   • Glucose, UA 08/23/2017 >=1000 mg/dL (3+)* Negative Final   • Ketones, UA 08/23/2017 80 mg/dL (3+)* Negative Final   • Bilirubin, UA 08/23/2017 Negative  Negative Final   • Blood, UA 08/23/2017 Small (1+)* Negative Final   • Protein, UA 08/23/2017 Negative  Negative Final   • Leuk Esterase, UA 08/23/2017 Negative  Negative Final   • Nitrite, UA 08/23/2017 Negative  Negative Final   • Urobilinogen, UA 08/23/2017 0.2 E.U./dL  0.2 - 1.0 E.U./dL Final   • Beta-Hydroxybutyrate Quant 08/23/2017 7.972* 0.020 - 0.270 mmol/L Final   • Extra Tube 08/23/2017 hold for add-on   Final    Auto resulted   • Extra Tube 08/23/2017 Hold for add-ons.   Final    Auto resulted.   • Extra Tube 08/23/2017 hold for add-on   Final    Auto resulted   • Extra Tube 08/23/2017 Hold for add-ons.   Final    Auto resulted.   • WBC 08/23/2017 17.05* 4.50 - 10.70 10*3/mm3 Final   • RBC 08/23/2017 4.14  3.90 - 5.20 10*6/mm3 Final   • Hemoglobin 08/23/2017 13.0  11.9 - 15.5 g/dL Final   • Hematocrit 08/23/2017 40.3  35.6 - 45.5 % Final   • MCV 08/23/2017 97.3  80.5 - 98.2 fL Final   • MCH 08/23/2017 31.4  26.9 - 32.0 pg Final   • MCHC 08/23/2017 32.3* 32.4 - 36.3 g/dL Final   • RDW 08/23/2017 12.9  11.7 - 13.0 % Final   • RDW-SD 08/23/2017 45.8  37.0 - 54.0 fl Final   • MPV 08/23/2017 12.3* 6.0 - 12.0 fL Final   • Platelets 08/23/2017 340  140 - 500 10*3/mm3 Final   • Neutrophil % 08/23/2017 92.9* 42.7 - 76.0 % Final   • Lymphocyte % 08/23/2017 3.6* 19.6 - 45.3 % Final   • Monocyte % 08/23/2017 3.0* 5.0 - 12.0 % Final   • Eosinophil % 08/23/2017 0.0* 0.3 - 6.2 % Final   •  Basophil % 08/23/2017 0.1  0.0 - 1.5 % Final   • Immature Grans % 08/23/2017 0.4  0.0 - 0.5 % Final   • Neutrophils, Absolute 08/23/2017 15.86* 1.90 - 8.10 10*3/mm3 Final   • Lymphocytes, Absolute 08/23/2017 0.61* 0.90 - 4.80 10*3/mm3 Final   • Monocytes, Absolute 08/23/2017 0.51  0.20 - 1.20 10*3/mm3 Final   • Eosinophils, Absolute 08/23/2017 0.00  0.00 - 0.70 10*3/mm3 Final   • Basophils, Absolute 08/23/2017 0.01  0.00 - 0.20 10*3/mm3 Final   • Immature Grans, Absolute 08/23/2017 0.06* 0.00 - 0.03 10*3/mm3 Final   • nRBC 08/23/2017 0.0  0.0 - 0.0 /100 WBC Final   • Ethanol 08/23/2017 <10  0 - 10 mg/dL Final   • Ethanol % 08/23/2017 <0.010  % Final   • RBC, UA 08/23/2017 0-2  None Seen, 0-2 /HPF Final   • WBC, UA 08/23/2017 0-2  None Seen, 0-2 /HPF Final   • Bacteria, UA 08/23/2017 None Seen  None Seen /HPF Final   • Squamous Epithelial Cells, UA 08/23/2017 0-2  None Seen, 0-2 /HPF Final   • Hyaline Casts, UA 08/23/2017 None Seen  None Seen /LPF Final   • Methodology 08/23/2017 Automated Microscopy   Final   • Glucose 08/24/2017 592* 70 - 130 mg/dL Final   • Site 08/24/2017 Arterial: left brachial   Final   • Chavez's Test 08/24/2017 N/A   Final   • pH, Arterial 08/24/2017 7.254* 7.350 - 7.450 pH units Final    Critical:Notify GUSTAVO ABREU (24-Aug-17 00:58:47)Read back ok   • pCO2, Arterial 08/24/2017 25.3* 35.0 - 45.0 mm Hg Final   • pO2, Arterial 08/24/2017 108.0* 80.0 - 100.0 mm Hg Final   • HCO3, Arterial 08/24/2017 11.2* 22.0 - 28.0 mmol/L Final   • Base Excess, Arterial 08/24/2017 -14.3* 0.0 - 2.0 mmol/L Final   • O2 Saturation Calculated 08/24/2017 97.4  92.0 - 99.0 % Final   • Barometric Pressure for Blood Gas 08/24/2017 751.4  mmHg Final   • Modality 08/24/2017 Room air   Final   • Set [facility] Resp Rate 08/24/2017 18   Final   • Rate 08/24/2017 18  Breaths/minute Final   • Phosphorus 08/24/2017 3.8  2.5 - 4.5 mg/dL Final   • Glucose 08/24/2017 428* 65 - 99 mg/dL Final   • BUN 08/24/2017 23* 6 - 20  mg/dL Final   • Creatinine 08/24/2017 1.08* 0.57 - 1.00 mg/dL Final   • Sodium 08/24/2017 134* 136 - 145 mmol/L Final   • Potassium 08/24/2017 4.5  3.5 - 5.2 mmol/L Final   • Chloride 08/24/2017 95* 98 - 107 mmol/L Final   • CO2 08/24/2017 8.0* 22.0 - 29.0 mmol/L Final   • Calcium 08/24/2017 8.6  8.6 - 10.5 mg/dL Final   • eGFR Non African Amer 08/24/2017 53* >60 mL/min/1.73 Final   • BUN/Creatinine Ratio 08/24/2017 21.3  7.0 - 25.0 Final   • Anion Gap 08/24/2017 31.0  mmol/L Final   • Magnesium 08/24/2017 2.3  1.6 - 2.6 mg/dL Final   • Ionized Calcium 08/24/2017 1.33  1.10 - 1.35 mmol/L Final   • Ionized Calcium 08/24/2017 5.3  4.6 - 5.4 mg/dL Final   • Phosphorus 08/24/2017 2.1* 2.5 - 4.5 mg/dL Final   • Glucose 08/24/2017 178* 65 - 99 mg/dL Final   • BUN 08/24/2017 17  6 - 20 mg/dL Final   • Creatinine 08/24/2017 0.88  0.57 - 1.00 mg/dL Final   • Sodium 08/24/2017 132* 136 - 145 mmol/L Final   • Potassium 08/24/2017 4.3  3.5 - 5.2 mmol/L Final   • Chloride 08/24/2017 99  98 - 107 mmol/L Final   • CO2 08/24/2017 15.1* 22.0 - 29.0 mmol/L Final   • Calcium 08/24/2017 8.5* 8.6 - 10.5 mg/dL Final   • eGFR Non  Amer 08/24/2017 67  >60 mL/min/1.73 Final   • BUN/Creatinine Ratio 08/24/2017 19.3  7.0 - 25.0 Final   • Anion Gap 08/24/2017 17.9  mmol/L Final   • Magnesium 08/24/2017 2.1  1.6 - 2.6 mg/dL Final   • Ionized Calcium 08/24/2017 1.28  1.10 - 1.35 mmol/L Final   • Ionized Calcium 08/24/2017 5.1  4.6 - 5.4 mg/dL Final   • Glucose 08/24/2017 488* 70 - 130 mg/dL Final   • Glucose 08/24/2017 489* 70 - 130 mg/dL Final   • Glucose 08/24/2017 320* 70 - 130 mg/dL Final   • Glucose 08/24/2017 316* 70 - 130 mg/dL Final   • Phosphorus 08/24/2017 2.1* 2.5 - 4.5 mg/dL Final   • Glucose 08/24/2017 216* 65 - 99 mg/dL Final   • BUN 08/24/2017 14  6 - 20 mg/dL Final   • Creatinine 08/24/2017 0.83  0.57 - 1.00 mg/dL Final   • Sodium 08/24/2017 130* 136 - 145 mmol/L Final   • Potassium 08/24/2017 4.4  3.5 - 5.2 mmol/L Final    • Chloride 08/24/2017 100  98 - 107 mmol/L Final   • CO2 08/24/2017 18.7* 22.0 - 29.0 mmol/L Final   • Calcium 08/24/2017 8.5* 8.6 - 10.5 mg/dL Final   • eGFR Non  Amer 08/24/2017 72  >60 mL/min/1.73 Final   • BUN/Creatinine Ratio 08/24/2017 16.9  7.0 - 25.0 Final   • Anion Gap 08/24/2017 11.3  mmol/L Final   • Magnesium 08/24/2017 2.1  1.6 - 2.6 mg/dL Final   • Ionized Calcium 08/24/2017 1.30  1.10 - 1.35 mmol/L Final   • Ionized Calcium 08/24/2017 5.2  4.6 - 5.4 mg/dL Final   • Glucose 08/24/2017 195* 70 - 130 mg/dL Final   • Glucose 08/24/2017 194* 70 - 130 mg/dL Final   • Glucose 08/24/2017 185* 70 - 130 mg/dL Final   • Glucose 08/24/2017 176* 70 - 130 mg/dL Final   • Phosphorus 08/24/2017 1.7* 2.5 - 4.5 mg/dL Final   • Glucose 08/24/2017 270* 65 - 99 mg/dL Final   • BUN 08/24/2017 13  6 - 20 mg/dL Final   • Creatinine 08/24/2017 0.83  0.57 - 1.00 mg/dL Final   • Sodium 08/24/2017 129* 136 - 145 mmol/L Final   • Potassium 08/24/2017 4.3  3.5 - 5.2 mmol/L Final   • Chloride 08/24/2017 98  98 - 107 mmol/L Final   • CO2 08/24/2017 19.0* 22.0 - 29.0 mmol/L Final   • Calcium 08/24/2017 8.9  8.6 - 10.5 mg/dL Final   • eGFR Non  Amer 08/24/2017 72  >60 mL/min/1.73 Final   • BUN/Creatinine Ratio 08/24/2017 15.7  7.0 - 25.0 Final   • Anion Gap 08/24/2017 12.0  mmol/L Final   • Magnesium 08/24/2017 2.2  1.6 - 2.6 mg/dL Final   • Ionized Calcium 08/24/2017 1.29  1.10 - 1.35 mmol/L Final   • Ionized Calcium 08/24/2017 5.2  4.6 - 5.4 mg/dL Final   • Glucose 08/24/2017 214* 70 - 130 mg/dL Final   • Glucose 08/24/2017 190* 70 - 130 mg/dL Final   • Glucose 08/24/2017 206* 70 - 130 mg/dL Final   • Glucose 08/24/2017 238* 70 - 130 mg/dL Final   • Glucose 08/24/2017 241* 70 - 130 mg/dL Final   • Glucose 08/24/2017 257* 70 - 130 mg/dL Final   • Free T4 08/24/2017 0.79* 0.93 - 1.70 ng/dL Final   • TSH 08/24/2017 2.900  0.270 - 4.200 mIU/mL Final   • Hemoglobin A1C 08/23/2017 8.39* 4.80 - 5.60 % Final   • GGT  08/24/2017 10  5 - 36 U/L Final   • Glucose 08/24/2017 297* 70 - 130 mg/dL Final   • Total Cholesterol 08/25/2017 190  0 - 200 mg/dL Final   • Triglycerides 08/25/2017 139  0 - 150 mg/dL Final   • HDL Cholesterol 08/25/2017 65* 40 - 60 mg/dL Final   • LDL Cholesterol  08/25/2017 97  0 - 100 mg/dL Final   • VLDL Cholesterol 08/25/2017 27.8  5 - 40 mg/dL Final   • LDL/HDL Ratio 08/25/2017 1.50   Final   • Hep A IgM 08/25/2017 Negative  Negative Final   • Hepatitis B Surface Ag 08/25/2017 Negative  Negative Final   • Hep B Core IgM 08/25/2017 Negative  Negative Final   • Glucose 08/25/2017 70  65 - 99 mg/dL Final   • BUN 08/25/2017 10  6 - 20 mg/dL Final   • Creatinine 08/25/2017 0.71  0.57 - 1.00 mg/dL Final   • Sodium 08/25/2017 141  136 - 145 mmol/L Final   • Potassium 08/25/2017 4.1  3.5 - 5.2 mmol/L Final   • Chloride 08/25/2017 106  98 - 107 mmol/L Final   • CO2 08/25/2017 23.5  22.0 - 29.0 mmol/L Final   • Calcium 08/25/2017 8.5* 8.6 - 10.5 mg/dL Final   • eGFR Non  Amer 08/25/2017 86  >60 mL/min/1.73 Final   • BUN/Creatinine Ratio 08/25/2017 14.1  7.0 - 25.0 Final   • Anion Gap 08/25/2017 11.5  mmol/L Final   • Phosphorus 08/25/2017 1.9* 2.5 - 4.5 mg/dL Final   • Glucose 08/25/2017 56* 70 - 130 mg/dL Final   • Glucose 08/25/2017 110  70 - 130 mg/dL Final   • Potassium 08/25/2017 4.2  3.5 - 5.2 mmol/L Final   • Glucose 08/25/2017 188* 70 - 130 mg/dL Final   • Glucose 08/25/2017 196* 70 - 130 mg/dL Final   • Potassium 08/26/2017 4.4  3.5 - 5.2 mmol/L Final   • Glucose 08/26/2017 185* 70 - 130 mg/dL Final   • Glucose 08/26/2017 230* 70 - 130 mg/dL Final   • Glucose 08/26/2017 182* 70 - 130 mg/dL Final   • Glucose 08/27/2017 137* 70 - 130 mg/dL Final   • Glucose 08/27/2017 221* 70 - 130 mg/dL Final     Lab Results   Component Value Date    HGBA1C 8.39 (H) 08/23/2017     Lab Results   Component Value Date    LDLCALC 97 08/25/2017    CREATININE 0.71 08/25/2017     Imaging Results (most recent)     None                 Assessment and Plan:    Ivon was seen today for diabetes.    Diagnoses and all orders for this visit:    Type 1 diabetes mellitus with hyperglycemia  -     Basic Metabolic Panel  -     Hemoglobin A1c  -     Vitamin D 25 Hydroxy  -     Vitamin B12 & Folate  -     TSH  -     Microalbumin / Creatinine Urine Ratio - Urine, Clean Catch  -     Lipid Panel  -     Insulin Antibody  -     Anti-islet Cell Antibody  -     Glutamic Acid Decarboxylase  -     C-Peptide    Insulin pump titration  -     Basic Metabolic Panel  -     Hemoglobin A1c  -     Vitamin D 25 Hydroxy  -     Vitamin B12 & Folate  -     TSH  -     Microalbumin / Creatinine Urine Ratio - Urine, Clean Catch  -     Lipid Panel  -     Insulin Antibody  -     Anti-islet Cell Antibody  -     Glutamic Acid Decarboxylase  -     C-Peptide    Essential hypertension  -     Discontinue: losartan-hydrochlorothiazide (HYZAAR) 100-12.5 MG per tablet; TAKE ONE TABLET BY MOUTH DAILY  -     Basic Metabolic Panel  -     Hemoglobin A1c  -     Vitamin D 25 Hydroxy  -     Vitamin B12 & Folate  -     TSH  -     Microalbumin / Creatinine Urine Ratio - Urine, Clean Catch  -     Lipid Panel  -     Insulin Antibody  -     Anti-islet Cell Antibody  -     Glutamic Acid Decarboxylase  -     C-Peptide    Other orders  -     zolpidem CR (AMBIEN CR) 12.5 MG CR tablet; Take 1 tablet by mouth At Night As Needed for Sleep. For sleep        Type I Diabetes Mellitus  Patient needs education on the omnipod for better adjustments on the pump  We will make the pump adjustments as below  Basal rates  12 AM-8:30 AM-0.75  8:30 AM-p.m.-0.65 -- > 0.60  4 PM-12 AM-0.55 --> 0.5    Bolus settings  12 AM- 12 - 9   12 - 4 - 10  4 - 8 pm - 9 gm  8 - 12 am - 8  Blood glucose target 12 AM-120 -140    Sensitivity-60 --> 50  Active insulin time-3    Will get the patient started back on the dexcom, will do the necessary paperwork.  Patient will definitely benefit with a sensor given her decreased  hypoglycemic awareness.    Will check lipid panel    Will check vitamin D 25-hydroxy, vitamin B12 levels.    Reviewed Lab results with the patient.     24 minutes out of 40 minutes face to face spent in counseling the patient extensively on making the pump changes

## 2017-12-21 RX ORDER — INSULIN PUMP CART,CONT INF,RF
1 CARTRIDGE (EA) SUBCUTANEOUS
Qty: 6 EACH | Refills: 1 | Status: SHIPPED | OUTPATIENT
Start: 2017-12-21 | End: 2018-01-02 | Stop reason: SDUPTHER

## 2017-12-21 RX ORDER — INSULIN PUMP CART,CONT INF,RF
CARTRIDGE (EA) SUBCUTANEOUS
Qty: 30 EACH | Refills: 3 | Status: CANCELLED | OUTPATIENT
Start: 2017-12-21

## 2017-12-22 LAB
25(OH)D3+25(OH)D2 SERPL-MCNC: 36.4 NG/ML (ref 30–100)
ALBUMIN/CREAT UR: 12.5 MG/G CREAT (ref 0–30)
BUN SERPL-MCNC: 12 MG/DL (ref 6–20)
BUN/CREAT SERPL: 14 (ref 7–25)
C PEPTIDE SERPL-MCNC: <0.1 NG/ML (ref 1.1–4.4)
CALCIUM SERPL-MCNC: 10 MG/DL (ref 8.6–10.5)
CHLORIDE SERPL-SCNC: 96 MMOL/L (ref 98–107)
CHOLEST SERPL-MCNC: 248 MG/DL (ref 0–200)
CO2 SERPL-SCNC: 23 MMOL/L (ref 22–29)
CREAT SERPL-MCNC: 0.86 MG/DL (ref 0.57–1)
CREAT UR-MCNC: 58.5 MG/DL
FOLATE SERPL-MCNC: 19.06 NG/ML (ref 4.78–24.2)
GAD65 AB SER IA-ACNC: 565.4 U/ML (ref 0–5)
GLUCOSE SERPL-MCNC: 190 MG/DL (ref 65–99)
HBA1C MFR BLD: 8.1 % (ref 4.8–5.6)
HDLC SERPL-MCNC: 68 MG/DL (ref 40–60)
INSULIN AB SER-ACNC: 54 UU/ML
INTERPRETATION: NORMAL
LDLC SERPL CALC-MCNC: 161 MG/DL (ref 0–100)
MICROALBUMIN UR-MCNC: 7.3 UG/ML
PANC ISLET CELL AB TITR SER: NEGATIVE {TITER}
POTASSIUM SERPL-SCNC: 3.8 MMOL/L (ref 3.5–5.2)
SODIUM SERPL-SCNC: 140 MMOL/L (ref 136–145)
TRIGL SERPL-MCNC: 97 MG/DL (ref 0–150)
TSH SERPL DL<=0.005 MIU/L-ACNC: 2.4 MIU/ML (ref 0.27–4.2)
VIT B12 SERPL-MCNC: 609 PG/ML (ref 211–946)
VLDLC SERPL CALC-MCNC: 19.4 MG/DL (ref 5–40)

## 2018-01-02 RX ORDER — INSULIN PUMP CART,CONT INF,RF
1 CARTRIDGE (EA) SUBCUTANEOUS
Qty: 6 EACH | Refills: 1 | Status: SHIPPED | OUTPATIENT
Start: 2018-01-02 | End: 2018-07-19

## 2018-02-01 ENCOUNTER — OFFICE VISIT (OUTPATIENT)
Dept: ENDOCRINOLOGY | Age: 53
End: 2018-02-01

## 2018-02-01 VITALS
HEIGHT: 65 IN | WEIGHT: 141 LBS | OXYGEN SATURATION: 96 % | BODY MASS INDEX: 23.49 KG/M2 | DIASTOLIC BLOOD PRESSURE: 78 MMHG | HEART RATE: 91 BPM | SYSTOLIC BLOOD PRESSURE: 118 MMHG

## 2018-02-01 DIAGNOSIS — E78.49 OTHER HYPERLIPIDEMIA: ICD-10-CM

## 2018-02-01 DIAGNOSIS — E10.65 TYPE 1 DIABETES MELLITUS WITH HYPERGLYCEMIA (HCC): ICD-10-CM

## 2018-02-01 DIAGNOSIS — Z46.81 INSULIN PUMP TITRATION: Primary | ICD-10-CM

## 2018-02-01 PROCEDURE — 99215 OFFICE O/P EST HI 40 MIN: CPT | Performed by: INTERNAL MEDICINE

## 2018-02-01 PROCEDURE — 95250 CONT GLUC MNTR PHYS/QHP EQP: CPT | Performed by: INTERNAL MEDICINE

## 2018-02-01 RX ORDER — ATORVASTATIN CALCIUM 20 MG/1
20 TABLET, FILM COATED ORAL DAILY
Qty: 30 TABLET | Refills: 11 | Status: SHIPPED | OUTPATIENT
Start: 2018-02-01 | End: 2019-01-04 | Stop reason: SDUPTHER

## 2018-02-01 NOTE — PROGRESS NOTES
52 y.o.    Patient Care Team:  Geo Fisher MD as PCP - General (Family Medicine)    Chief Complaint:    8 WEEK FOLLOW UP/ TYPE 1 DIABETES MELLITUS  Subjective     HPI    Ivon CORRALES Jordan,52 y.o. WF is here as follow up for the management of type 1 dm.      Type 1 dm - Diagnosed about 11 years ago. Insulin was started about 10 years ago. Currently on omnipod since June 2017 and DEXCOM also since around that time but stopped due to no supplies since aug 2017.  Patient is currently not using dexcom as she needs to make the deductible before her insurance covers the sensor.  She currently checks her blood sugars 6-8 times a day.  Her blood sugars are extremely variable.  Fasting blood oyxqbn-236-476 mg/dL, pre-meal blood sugars-100- 250 mg/dL.  She does have few blood sugars which are around 50, and few blood sugars which are around 300.  Lowest blood sugar for the patient had in the last few weeks was around 32 mg/dL.  She reports that she feels low blood sugar symptoms since her blood sugar is around 40 mg/dL.  She got her log book and glucometer for me to review.  Last eye exam was in July 2017 and no history of diabetic retinopathy  No tingling or numbness in her bilateral feet, no ulcers or amputations of her feet  No history of CAD, CK D, CVA.  She is physically active and her weight is stable.  She tries to follow diabetic diet for most part but reports that she is not so comfortable with carbohydrate counting and that she needs some help in terms of the pump management as well.  On ARB.  Last DKA was in Aug 2017 and that was her first episode of DKA.      Insulin pump settings  Basal rates    12 am - 4 pm - 0.65  4 pm - 12 am - 0.5    Bolus settings  12 am - 12 pm - 9  12 pm - 4 pm - 10  4 pm - 8 pm - 9   8 pm - 12 am - 8    Blood glucose target 12 AM-120 - 140  Sensitivity-50  Active insulin time-3    Patient changed few of her sensor settings on her own since her last visit.    Interval  History      The following portions of the patient's history were reviewed and updated as appropriate: allergies, current medications, past family history, past medical history, past social history, past surgical history and problem list.    Past Medical History:   Diagnosis Date   • Diabetes mellitus    • Esophageal candidiasis    • Hypertension    • Insomnia      Family History   Problem Relation Age of Onset   • Adopted: Yes     Social History     Social History   • Marital status:      Spouse name: N/A   • Number of children: N/A   • Years of education: N/A     Occupational History   • Not on file.     Social History Main Topics   • Smoking status: Never Smoker   • Smokeless tobacco: Never Used   • Alcohol use Yes   • Drug use: Not on file   • Sexual activity: Not on file     Other Topics Concern   • Not on file     Social History Narrative     Allergies   Allergen Reactions   • Ampicillin Diarrhea       Current Outpatient Prescriptions:   •  DULoxetine (CYMBALTA) 60 MG capsule, Take 1 capsule by mouth Daily., Disp: 30 capsule, Rfl: 0  •  glucose blood (FREESTYLE TEST STRIPS) test strip, USE TO TEST BLOOD SUGAR 6 TIMES DAILY, Disp: 600 each, Rfl: 3  •  glucose blood (FREESTYLE TEST STRIPS) test strip, USE TO TEST BLOOD SUGAR 6 TIMES DAILY, Disp: 600 each, Rfl: 3  •  glucose blood test strip, OneTouch Ultra Blue In Vitro Strip; Patient Sig: OneTouch Ultra Blue In Vitro Strip ; 255; 0; 24-Jun-2013; Active, Disp: , Rfl:   •  insulin aspart (NOVOLOG FLEXPEN) 100 UNIT/ML solution pen-injector sc pen, Inject under the skin., Disp: , Rfl:   •  losartan-hydrochlorothiazide (HYZAAR) 100-12.5 MG per tablet, TAKE ONE TABLET BY MOUTH DAILY, Disp: 30 tablet, Rfl: 0  •  MILK THISTLE PO, Take  by mouth., Disp: , Rfl:   •  zolpidem CR (AMBIEN CR) 12.5 MG CR tablet, Take 1 tablet by mouth At Night As Needed for Sleep. For sleep, Disp: 30 tablet, Rfl: 0  •  atorvastatin (LIPITOR) 20 MG tablet, Take 1 tablet by mouth  "Daily., Disp: 30 tablet, Rfl: 11  •  Insulin Disposable Pump (OMNIPOD 5 PACK) misc, 1 package Every 14 (Fourteen) Days., Disp: 6 each, Rfl: 1        Review of Systems   Constitutional: Negative for fever.   HENT: Negative for facial swelling, nosebleeds, trouble swallowing and voice change.    Eyes: Negative for pain and redness.   Respiratory: Negative for shortness of breath and wheezing.    Cardiovascular: Negative for palpitations and leg swelling.   Gastrointestinal: Negative for abdominal pain, diarrhea and vomiting.   Endocrine: Positive for cold intolerance, heat intolerance and polydipsia. Negative for polyuria.   Genitourinary: Negative for decreased urine volume and frequency.   Musculoskeletal: Positive for neck pain. Negative for joint swelling.   Skin: Negative for rash.   Allergic/Immunologic: Negative for immunocompromised state.   Neurological: Negative for seizures and facial asymmetry.   Hematological: Does not bruise/bleed easily.   Psychiatric/Behavioral: Negative for agitation and confusion.       Objective       Vitals:    02/01/18 1026   BP: 118/78   Pulse: 91   SpO2: 96%   Weight: 64 kg (141 lb)   Height: 165.1 cm (65\")     Body mass index is 23.46 kg/(m^2).      Physical Exam   Gen exam - alert and oriented x 3,  not in distress.   HEENT - no acanthosis nigricans. Thyroid palpable.   Resp - Clear to auscultation.   CVS - S1,S2 heard and no murmurs.   Abd - Non tender, BS heard.   Ext - No edema and intact pin prick and proprioception.     Results Review:     I reviewed the patient's new clinical results.    Medical records reviewed  Summary: done      Office Visit on 12/14/2017   Component Date Value Ref Range Status   • Glucose 12/14/2017 190* 65 - 99 mg/dL Final   • BUN 12/14/2017 12  6 - 20 mg/dL Final   • Creatinine 12/14/2017 0.86  0.57 - 1.00 mg/dL Final   • eGFR Non  Am 12/14/2017 69  >60 mL/min/1.73 Final   • eGFR African Am 12/14/2017 83  >60 mL/min/1.73 Final   • " BUN/Creatinine Ratio 12/14/2017 14.0  7.0 - 25.0 Final   • Sodium 12/14/2017 140  136 - 145 mmol/L Final   • Potassium 12/14/2017 3.8  3.5 - 5.2 mmol/L Final   • Chloride 12/14/2017 96* 98 - 107 mmol/L Final   • Total CO2 12/14/2017 23.0  22.0 - 29.0 mmol/L Final   • Calcium 12/14/2017 10.0  8.6 - 10.5 mg/dL Final   • Hemoglobin A1C 12/14/2017 8.10* 4.80 - 5.60 % Final    Comment: Hemoglobin A1C Ranges:  Increased Risk for Diabetes  5.7% to 6.4%  Diabetes                     >= 6.5%  Diabetic Goal                < 7.0%     • 25 Hydroxy, Vitamin D 12/14/2017 36.4  30.0 - 100.0 ng/mL Final    Comment: Reference Range for Total Vitamin D 25(OH)  Deficiency    <20.0 ng/mL  Insufficiency 21-29 ng/mL  Sufficiency    ng/mL  Toxicity      >100 ng/ml        • Vitamin B-12 12/14/2017 609  211 - 946 pg/mL Final   • Folate 12/14/2017 19.06  4.78 - 24.20 ng/mL Final   • TSH 12/14/2017 2.400  0.270 - 4.200 mIU/mL Final   • Creatinine, Urine 12/14/2017 58.5  Not Estab. mg/dL Final   • Microalbumin, Urine 12/14/2017 7.3  Not Estab. ug/mL Final   • Microalbumin/Creatinine Ratio 12/14/2017 12.5  0.0 - 30.0 mg/g creat Final   • Total Cholesterol 12/14/2017 248* 0 - 200 mg/dL Final   • Triglycerides 12/14/2017 97  0 - 150 mg/dL Final   • HDL Cholesterol 12/14/2017 68* 40 - 60 mg/dL Final   • VLDL Cholesterol 12/14/2017 19.4  5 - 40 mg/dL Final   • LDL Cholesterol  12/14/2017 161* 0 - 100 mg/dL Final   • Insulin AutoAb 12/14/2017 54* uU/mL Final    Comment: This test is also known as insulin autoantibody or IAA.  Reference Range:  <5.0        Negative  > or = 5.0  Positive     • Islet Cell Ab 12/14/2017 Negative  Neg:<1:1 Final   • LAVINIA-65 12/14/2017 565.4* 0.0 - 5.0 U/mL Final    **Results verified by repeat testing**   • C-Peptide 12/14/2017 <0.1* 1.1 - 4.4 ng/mL Final    C-Peptide reference interval is for fasting patients.   • Interpretation 12/14/2017 Note   Final    Supplemental report is available.     Lab Results    Component Value Date    HGBA1C 8.10 (H) 12/14/2017    HGBA1C 8.39 (H) 08/23/2017     Lab Results   Component Value Date    MICROALBUR 7.3 12/14/2017    LDLCALC 97 08/25/2017    CREATININE 0.86 12/14/2017     Imaging Results (most recent)     None                Assessment and Plan:    Ivon was seen today for diabetes.    Diagnoses and all orders for this visit:    Insulin pump titration  -     Hemoglobin A1c; Future  -     Basic Metabolic Panel; Future  -     Lipid Panel; Future  -     Microalbumin / Creatinine Urine Ratio - Urine, Clean Catch; Future  -     TSH; Future  -     Vitamin B12 & Folate; Future  -     Vitamin D 25 Hydroxy; Future  -     Ambulatory Referral to Diabetic Education    Type 1 diabetes mellitus with hyperglycemia  -     Hemoglobin A1c; Future  -     Basic Metabolic Panel; Future  -     Lipid Panel; Future  -     Microalbumin / Creatinine Urine Ratio - Urine, Clean Catch; Future  -     TSH; Future  -     Vitamin B12 & Folate; Future  -     Vitamin D 25 Hydroxy; Future  -     Ambulatory Referral to Diabetic Education    Other orders  -     atorvastatin (LIPITOR) 20 MG tablet; Take 1 tablet by mouth Daily.      Diabetes mellitus  TrL0a-0.1%  Will make the below pump changes  12 AM-6-0.60  6 AM-5 PM-0.70  5 PM-10 PM-0.50  10 PM-12 AM-0.45    Bolus settings  12 AM-12 PM-8  12 PM-5 PM-7  5 PM-12 AM-9  Blood glucose target 12 AM-110-120  Sensitivity 12 AM-6 AM-15  6 AM-12 AM-45  Active insulin time 3 hours  Will refer the patient to Joesph Enriquez-diabetic educator to help the patient on carb Counting and also helping her about pump.    Hyperlipidemia  Will start patient on Lipitor 20 mg oral daily    Placed continues glucose monitoring on the patient to further change her pump settings.    Reviewed Lab results with the patient.     25 minutes out of 40 minutes face to face spent in counseling the patient extensively on making the pump changes, placement of the continuous glucose monitoring.

## 2018-02-21 ENCOUNTER — TREATMENT (OUTPATIENT)
Dept: ENDOCRINOLOGY | Age: 53
End: 2018-02-21

## 2018-02-21 DIAGNOSIS — E10.65 TYPE 1 DIABETES MELLITUS WITH HYPERGLYCEMIA (HCC): ICD-10-CM

## 2018-02-21 PROCEDURE — 95251 CONT GLUC MNTR ANALYSIS I&R: CPT | Performed by: INTERNAL MEDICINE

## 2018-02-21 NOTE — PROGRESS NOTES
Continues glucose monitoring data    Patient wore continuous glucose monitoring-free style mitchell Pro from Feb 1- 12 th  Average blood glucose reading was 153 mg/dl  Estimated HbA1c 7%   Likelihood of low blood glucose levels was low  Likelihood of high blood glucose levels was high   Blood glucose trends showed high BG ar post prandial times    Current treatment regimen  12 AM-6-0.60  6 AM-5 PM-0.70  5 PM-10 PM-0.50  10 PM-12 AM-0.45     Bolus settings  12 AM-12 PM-8  12 PM-5 PM-7  5 PM-12 AM-9  Blood glucose target 12 AM-110-120  Sensitivity 12 AM-6 AM-15  6 AM-12 AM-45  Active insulin time 3 hours    Changes to the treatment regimen  12 AM-6-0.60 --> 0.55   6 AM-5 PM-0.70   5 PM-10 PM-0.50   10 PM-12 AM-0.45 --> 0.40     Bolus settings  12 AM-12 PM-8   12 PM-5 PM-7 --> 6  5 PM-12 AM-9   Blood glucose target 12 AM-110-120  Sensitivity 12 AM-6 AM- 50   6 AM-12 AM-45  Active insulin time 3 hours    Will call and make these changes to her pump.

## 2018-04-09 ENCOUNTER — TELEPHONE (OUTPATIENT)
Dept: ENDOCRINOLOGY | Age: 53
End: 2018-04-09

## 2018-04-09 NOTE — TELEPHONE ENCOUNTER
SPOKE WITH A REPRESENTATIVES WITH [facility]  [employer]  ABOUT PATIENT SENSOR. REPRESENTATIVE IS GOING TO FAX OVER PAPER WORK FOR NEW PRESCRIPTION

## 2018-04-09 NOTE — TELEPHONE ENCOUNTER
SPOKE WITH A REPRESENTATIVES WITH [facility]  ABOUT PATIENT SENSOR. REPRESENTATIVE IS GOING TO FAX OVER PAPER WORK FOR NEW PRESCRIPTION              ----- Message from Anisha Bullard sent at 4/9/2018 11:32 AM EDT -----  Contact: PATIENT  PATIENT ASKING FOR YOU TO CONTACT HUGO FLORES [phone]  TO ORDER SENSORS FOR BLOOD SUGARS. SHE  SAID SHE HAS BEEN OUT SINCE AUGUST BUT HAS CHOSEN TO DO THAT.   SHE USES 1 SENSOR A WEEK.   SHE ASKED THEM TO BILL HER INSURANCE FIRST AND THAT THEY HAVE CURRENT ADDRESS THAT I VERIFIED IS CORRECT.   [phone]

## 2018-04-11 ENCOUNTER — TELEPHONE (OUTPATIENT)
Dept: ENDOCRINOLOGY | Age: 53
End: 2018-04-11

## 2018-04-27 ENCOUNTER — TELEPHONE (OUTPATIENT)
Dept: ENDOCRINOLOGY | Age: 53
End: 2018-04-27

## 2018-04-27 NOTE — TELEPHONE ENCOUNTER
Called and spoke with Sree about patient. They wanted to know if we received a medical necessity form . Let them know that I have not received anything from them in regards of the patient.      They refaxed  The form           ----- Message from Dali Wilkerson sent at 4/27/2018 11:25 AM EDT -----  Contact: [facility] [employer]   MESSAGE FROM [facility]   COMPANY:   [employer]       MESSAGE/ISSUE:   MRS MOTA HAS CALLED IN REGARDS TO THE PATIENT MEDICAL   NECESSITY FORM .  THEY HAVE FAXED IT OVER 04/26/2018.  THEY ARE ASKING FOR A CALL BACK IF WE HAVE NOT VD IT       CALL BACK [phone]

## 2018-05-02 ENCOUNTER — RESULTS ENCOUNTER (OUTPATIENT)
Dept: ENDOCRINOLOGY | Age: 53
End: 2018-05-02

## 2018-05-02 DIAGNOSIS — Z46.81 INSULIN PUMP TITRATION: ICD-10-CM

## 2018-05-02 DIAGNOSIS — E10.65 TYPE 1 DIABETES MELLITUS WITH HYPERGLYCEMIA (HCC): ICD-10-CM

## 2018-05-03 DIAGNOSIS — I10 ESSENTIAL HYPERTENSION: ICD-10-CM

## 2018-05-03 RX ORDER — LOSARTAN POTASSIUM AND HYDROCHLOROTHIAZIDE 12.5; 1 MG/1; MG/1
TABLET ORAL
Qty: 30 TABLET | Refills: 1 | Status: SHIPPED | OUTPATIENT
Start: 2018-05-03 | End: 2018-06-22 | Stop reason: SDUPTHER

## 2018-05-03 RX ORDER — ZOLPIDEM TARTRATE 12.5 MG/1
12.5 TABLET, FILM COATED, EXTENDED RELEASE ORAL NIGHTLY PRN
Qty: 30 TABLET | Refills: 0 | OUTPATIENT
Start: 2018-05-03 | End: 2018-06-08 | Stop reason: SDUPTHER

## 2018-05-08 ENCOUNTER — TELEPHONE (OUTPATIENT)
Dept: ENDOCRINOLOGY | Age: 53
End: 2018-05-08

## 2018-05-08 NOTE — TELEPHONE ENCOUNTER
Zolpiden CR 12.5 mg   Take 1 tablet by mouth At Night As Needed for Sleep. For sleep, Starting Thu 5/3/2018, Phone Into [facility]  THIS IS A ONE TIME FILL ALL OTHER REFILLS NEED TO BE DONE THOUGH THE PCP

## 2018-05-24 ENCOUNTER — TELEPHONE (OUTPATIENT)
Dept: ENDOCRINOLOGY | Age: 53
End: 2018-05-24

## 2018-05-24 LAB
25(OH)D3+25(OH)D2 SERPL-MCNC: 34.8 NG/ML (ref 30–100)
ALBUMIN/CREAT UR: <7.9 MG/G CREAT (ref 0–30)
BUN SERPL-MCNC: 10 MG/DL (ref 6–24)
BUN/CREAT SERPL: 13 (ref 9–23)
CALCIUM SERPL-MCNC: 9.8 MG/DL (ref 8.7–10.2)
CHLORIDE SERPL-SCNC: 101 MMOL/L (ref 96–106)
CHOLEST SERPL-MCNC: 159 MG/DL (ref 100–199)
CO2 SERPL-SCNC: 24 MMOL/L (ref 18–29)
CREAT SERPL-MCNC: 0.75 MG/DL (ref 0.57–1)
CREAT UR-MCNC: 37.8 MG/DL
FOLATE SERPL-MCNC: 19.4 NG/ML
GFR SERPLBLD CREATININE-BSD FMLA CKD-EPI: 104 ML/MIN/1.73
GFR SERPLBLD CREATININE-BSD FMLA CKD-EPI: 90 ML/MIN/1.73
GLUCOSE SERPL-MCNC: 187 MG/DL (ref 65–99)
HBA1C MFR BLD: 7.9 % (ref 4.8–5.6)
HDLC SERPL-MCNC: 70 MG/DL
INTERPRETATION: NORMAL
LDLC SERPL CALC-MCNC: 72 MG/DL (ref 0–99)
Lab: NORMAL
MICROALBUMIN UR-MCNC: <3 UG/ML
POTASSIUM SERPL-SCNC: 4 MMOL/L (ref 3.5–5.2)
SODIUM SERPL-SCNC: 142 MMOL/L (ref 134–144)
TRIGL SERPL-MCNC: 83 MG/DL (ref 0–149)
TSH SERPL DL<=0.005 MIU/L-ACNC: 3.87 UIU/ML (ref 0.45–4.5)
VIT B12 SERPL-MCNC: 760 PG/ML (ref 232–1245)
VLDLC SERPL CALC-MCNC: 17 MG/DL (ref 5–40)

## 2018-05-24 NOTE — TELEPHONE ENCOUNTER
Spoke with patient about getting a code for her dexcom     Left voice mail with code for patient      ----- Message from Britni Nguyen sent at 5/23/2018 10:17 AM EDT -----  Contact: PATIENT  PATIENT STATED THAT SHE NEEDS Zettaset CODE, IN ORDER TO FOR THE DRRadhaTO SEE ALL HER DIABETIC INFORMATION.     BEST # [phone]     PATIENT STATED THAT SHE IS COMING IN TO SEE DR. TOPETE ON Friday AND SHE NEEDS THAT CODE. AND YOU CAN LEAVE A MESSAGE

## 2018-05-25 ENCOUNTER — OFFICE VISIT (OUTPATIENT)
Dept: ENDOCRINOLOGY | Age: 53
End: 2018-05-25

## 2018-05-25 VITALS
HEART RATE: 61 BPM | WEIGHT: 145.6 LBS | SYSTOLIC BLOOD PRESSURE: 130 MMHG | HEIGHT: 65 IN | DIASTOLIC BLOOD PRESSURE: 86 MMHG | BODY MASS INDEX: 24.26 KG/M2 | OXYGEN SATURATION: 95 %

## 2018-05-25 DIAGNOSIS — Z46.81 INSULIN PUMP TITRATION: ICD-10-CM

## 2018-05-25 DIAGNOSIS — E78.49 OTHER HYPERLIPIDEMIA: ICD-10-CM

## 2018-05-25 DIAGNOSIS — E10.65 TYPE 1 DIABETES MELLITUS WITH HYPERGLYCEMIA (HCC): Primary | ICD-10-CM

## 2018-05-25 PROCEDURE — 99214 OFFICE O/P EST MOD 30 MIN: CPT | Performed by: INTERNAL MEDICINE

## 2018-05-25 NOTE — PROGRESS NOTES
53 y.o.    Patient Care Team:  No Known Provider as PCP - General    Chief Complaint:    3 month follow up/type 1 diabetes mellitus  Subjective     HPI    Ivon Ortega,53 y.o. WF is here as follow up for the management of type 1 dm.      Type 1 dm - Diagnosed about 11 years ago. Insulin was started about 10 years ago.   Today in clinic she reports she is on omnipod since June 2017 and was on dexcom since then.  Now not on dexcom due to insurance issues. But after submitting the requests she got the dexcom approved. She will place the dexcom in the next week or so.   Checks BG 4 - 5 times a day.   Reviewed her glucometer and most of her BG are running high - 300 mg/dl.   She does have some low BG around 51 mg/dl, does have decreased hypoglycemic awareness. She starts feeling low BG symptoms when her BG is below 40 mg/dl.   Last eye exam was in July 2017 and no hx of dm retinopathy.   No hx of dm neuropathy.   No hx of cad, ckd, cva per pt.   She is physically active and her weight stable.   She tries to follow diabetic diet for most part but reports that she is not so comfortable with carbohydrate counting and that she needs some help in terms of the pump management as well.  On ARB.  Last DKA was in Aug 2017 and that was her first episode of DKA.     She still cries in the room due to stress in her life and divorce.       HLP   On liptor 20 mg po daily.      Reviewed primary care physician's/consulting physician documentation and lab results :     Interval History      The following portions of the patient's history were reviewed and updated as appropriate: allergies, current medications, past family history, past medical history, past social history, past surgical history and problem list.    Past Medical History:   Diagnosis Date   • Diabetes mellitus    • Esophageal candidiasis    • Hypertension    • Insomnia      Family History   Problem Relation Age of Onset   • Adopted: Yes     Social History     Social  History   • Marital status:      Spouse name: N/A   • Number of children: N/A   • Years of education: N/A     Occupational History   • Not on file.     Social History Main Topics   • Smoking status: Never Smoker   • Smokeless tobacco: Never Used   • Alcohol use Yes   • Drug use: Unknown   • Sexual activity: Not on file     Other Topics Concern   • Not on file     Social History Narrative   • No narrative on file     Allergies   Allergen Reactions   • Ampicillin Diarrhea       Current Outpatient Prescriptions:   •  atorvastatin (LIPITOR) 20 MG tablet, Take 1 tablet by mouth Daily., Disp: 30 tablet, Rfl: 11  •  glucose blood (FREESTYLE TEST STRIPS) test strip, USE TO TEST BLOOD SUGAR 6 TIMES DAILY, Disp: 600 each, Rfl: 3  •  glucose blood test strip, OneTouch Ultra Blue In Vitro Strip; Patient Sig: OneTouch Ultra Blue In Vitro Strip ; 255; 0; 24-Jun-2013; Active, Disp: , Rfl:   •  Insulin Disposable Pump (OMNIPOD 5 PACK) misc, 1 package Every 14 (Fourteen) Days., Disp: 6 each, Rfl: 1  •  losartan-hydrochlorothiazide (HYZAAR) 100-12.5 MG per tablet, TAKE ONE TABLET BY MOUTH DAILY, Disp: 30 tablet, Rfl: 1  •  MILK THISTLE PO, Take  by mouth., Disp: , Rfl:   •  zolpidem CR (AMBIEN CR) 12.5 MG CR tablet, Take 1 tablet by mouth At Night As Needed for Sleep. For sleep, Disp: 30 tablet, Rfl: 0  •  aspirin 81 MG tablet, Take 81 mg by mouth., Disp: , Rfl:   •  DULoxetine (CYMBALTA) 60 MG capsule, Take 1 capsule by mouth Daily., Disp: 30 capsule, Rfl: 0  •  insulin aspart (NOVOLOG FLEXPEN) 100 UNIT/ML solution pen-injector sc pen, Inject under the skin., Disp: , Rfl:   •  insulin aspart (NOVOLOG) 100 UNIT/ML injection, Inject 150 units VIA Insulin pump every three days, Disp: 50 mL, Rfl: 3        Review of Systems   Constitutional: Positive for fatigue. Negative for appetite change and fever.   Eyes: Negative for visual disturbance.   Respiratory: Negative for shortness of breath.    Cardiovascular: Negative for  "palpitations and leg swelling.   Gastrointestinal: Negative for abdominal pain and vomiting.   Endocrine: Negative for polydipsia and polyuria.   Musculoskeletal: Positive for neck pain. Negative for joint swelling.   Skin: Negative for rash.   Neurological: Negative for weakness and numbness.   Psychiatric/Behavioral: Negative for behavioral problems.       Objective       Vitals:    05/25/18 1104   BP: 130/86   Pulse: 61   SpO2: 95%   Weight: 66 kg (145 lb 9.6 oz)   Height: 165.1 cm (65\")     Body mass index is 24.23 kg/m².      Physical Exam   Constitutional: She is oriented to person, place, and time. She appears well-nourished.        HENT:   Head: Normocephalic and atraumatic.   Eyes: Conjunctivae and EOM are normal. No scleral icterus.   Neck: Normal range of motion. Neck supple. No thyromegaly present.   Cardiovascular: Normal rate and normal heart sounds.    Pulmonary/Chest: Effort normal and breath sounds normal. No stridor. She has no wheezes.   Abdominal: Soft. Bowel sounds are normal. She exhibits no distension. There is no tenderness.   Musculoskeletal: She exhibits no edema or tenderness.   Neurological: She is alert and oriented to person, place, and time.   Intact pin prick   Skin: Skin is warm and dry. She is not diaphoretic.   Psychiatric: She has a normal mood and affect.   Vitals reviewed.    Results Review:     I reviewed the patient's new clinical results and mentioned them above in HPI and in plan as well.    Medical records reviewed  Summary: done      Results Encounter on 05/02/2018   Component Date Value Ref Range Status   • Hemoglobin A1C 05/23/2018 7.9* 4.8 - 5.6 % Final    Comment:          Pre-diabetes: 5.7 - 6.4           Diabetes: >6.4           Glycemic control for adults with diabetes: <7.0     • Glucose 05/23/2018 187* 65 - 99 mg/dL Final    Comment: Specimen received in contact with cells. No visible hemolysis  present. However GLUC may be decreased and K increased. " Clinical  correlation indicated.     • BUN 05/23/2018 10  6 - 24 mg/dL Final   • Creatinine 05/23/2018 0.75  0.57 - 1.00 mg/dL Final   • eGFR Non African Am 05/23/2018 90  >59 mL/min/1.73 Final   • eGFR African Am 05/23/2018 104  >59 mL/min/1.73 Final   • BUN/Creatinine Ratio 05/23/2018 13  9 - 23 Final   • Sodium 05/23/2018 142  134 - 144 mmol/L Final   • Potassium 05/23/2018 4.0  3.5 - 5.2 mmol/L Final    Comment: Specimen received in contact with cells. No visible hemolysis  present. However GLUC may be decreased and K increased. Clinical  correlation indicated.     • Chloride 05/23/2018 101  96 - 106 mmol/L Final   • Total CO2 05/23/2018 24  18 - 29 mmol/L Final   • Calcium 05/23/2018 9.8  8.7 - 10.2 mg/dL Final   • Total Cholesterol 05/23/2018 159  100 - 199 mg/dL Final   • Triglycerides 05/23/2018 83  0 - 149 mg/dL Final   • HDL Cholesterol 05/23/2018 70  >39 mg/dL Final   • VLDL Cholesterol 05/23/2018 17  5 - 40 mg/dL Final   • LDL Cholesterol  05/23/2018 72  0 - 99 mg/dL Final   • Creatinine, Urine 05/23/2018 37.8  Not Estab. mg/dL Final   • Microalbumin, Urine 05/23/2018 <3.0  Not Estab. ug/mL Final   • Microalbumin/Creatinine Ratio 05/23/2018 <7.9  0.0 - 30.0 mg/g creat Final   • TSH 05/23/2018 3.870  0.450 - 4.500 uIU/mL Final   • Vitamin B-12 05/23/2018 760  232 - 1,245 pg/mL Final   • Folate 05/23/2018 19.4  >3.0 ng/mL Final    Comment: A serum folate concentration of less than 3.1 ng/mL is  considered to represent clinical deficiency.     • 25 Hydroxy, Vitamin D 05/23/2018 34.8  30.0 - 100.0 ng/mL Final    Comment: Vitamin D deficiency has been defined by the [place] of  Medicine and an Endocrine Society practice guideline as a  level of serum 25-OH vitamin D less than 20 ng/mL (1,2).  The Endocrine Society went on to further define vitamin D  insufficiency as a level between 21 and 29 ng/mL (2).  1. IOM ([place] of [employer]). 2010. Dietary reference     intakes for calcium and D. [place]:  "The     National Academies Press.  2. Cat MF, [place], Donna TORRES, et al.     Evaluation, treatment, and prevention of vitamin D     deficiency: an Endocrine Society clinical practice     guideline. JCEM. 2011 Jul; 96(7):1911-30.     • Interpretation 05/23/2018 Note   Final    Supplemental report is available.   • PDF Image 05/23/2018 Not applicable   Final     Lab Results   Component Value Date    HGBA1C 7.9 (H) 05/23/2018    HGBA1C 8.10 (H) 12/14/2017    HGBA1C 8.39 (H) 08/23/2017     Lab Results   Component Value Date    MICROALBUR <3.0 05/23/2018    CREATININE 0.75 05/23/2018     Imaging Results (most recent)     None                Assessment and Plan:    Ivon was seen today for diabetes.    Diagnoses and all orders for this visit:    Type 1 diabetes mellitus with hyperglycemia  -     Hemoglobin A1c; Future  -     Lipid Panel; Future  -     Hemoglobin A1c  -     Lipid Panel    Insulin pump titration  -     Hemoglobin A1c; Future  -     Lipid Panel; Future  -     Hemoglobin A1c  -     Lipid Panel    Other hyperlipidemia  -     Hemoglobin A1c; Future  -     Lipid Panel; Future  -     Hemoglobin A1c  -     Lipid Panel      Type 1 Diabetes Mellitus-Uncontrolled  HbA1c Is Still Elevated  Made Insulin Pump Changes  Titrated the Insulin Pump Based on Patient's Blood Glucose Trends.    Advised Patient to Get Her Dexcom Started Immediately When It Is Shipped to Her.  Advised the Patient to sent the Dexcom Information to Us to Further Make Her Insulin Pump Changes.    Patient However Is Extremely Concerned about the Extra bill if The Dexcom Information Is Interpreted, That Her Insurance Would Not Be Covering It for Her.    Hyperlipidemia  LDL Level at Goal  Continue Lipitor 20 Mg Oral Daily.    Reviewed Lab results with the patient.             Shyam Zuniga MD  05/25/18    EMR Dragon / transcription disclaimer:     \"Dictated utilizing Dragon dictation\".  "

## 2018-06-08 ENCOUNTER — TELEPHONE (OUTPATIENT)
Dept: ENDOCRINOLOGY | Age: 53
End: 2018-06-08

## 2018-06-08 RX ORDER — ZOLPIDEM TARTRATE 12.5 MG/1
TABLET, FILM COATED, EXTENDED RELEASE ORAL
Qty: 30 TABLET | Refills: 0 | Status: SHIPPED | OUTPATIENT
Start: 2018-06-08 | End: 2018-07-11 | Stop reason: SDUPTHER

## 2018-06-08 NOTE — TELEPHONE ENCOUNTER
zolpidem CR (AMBIEN CR) 12.5 MG CR tablet  WAS CALLED INTO PATIENT PHARMACY  APPROVED BY DR TOPETE FOR A ONE TIME FILL ALL OTHER REFILL MUST BE DONE BY PCP  QTY 30  NO REFILLS                ----- Message from Dali Wilkerson sent at 6/7/2018 11:28 AM EDT -----  Contact: PATIENT   PATIENT   REQUEST TO REFILL  MEDICATION:  MEDICATION:  zolpidem CR (AMBIEN CR) 12.5 MG CR tablet     MESSAGE:  PATIENT HAS CALLED IN REGARDS TO GETTING THE ABOVE MEDICATION REFILLED. PLEASE SENT THE MEDICATION TO  THE FOLLOWING PHARMACY    Pharmacy:  [place] [id number] [facility] - [phone]  - [phone] FX Phone:  [phone] Fax:  [phone]   PHONE NUMBER: [phone]

## 2018-06-22 DIAGNOSIS — I10 ESSENTIAL HYPERTENSION: ICD-10-CM

## 2018-06-22 RX ORDER — LOSARTAN POTASSIUM AND HYDROCHLOROTHIAZIDE 12.5; 1 MG/1; MG/1
TABLET ORAL
Qty: 30 TABLET | Refills: 1 | Status: SHIPPED | OUTPATIENT
Start: 2018-06-22 | End: 2018-08-29 | Stop reason: SDUPTHER

## 2018-06-22 NOTE — TELEPHONE ENCOUNTER
ONE TOUCH  ULTRA TEST STRIP HAS BEEN SENT TO [facility] (formerly [facility]) AT PATIENT REQUEST            ----- Message from Britni Nguyen sent at 6/22/2018  9:37 AM EDT -----  Contact: PATIENT  PATIENT STATED THAT SHE NEEDS THE ONE TOUCH ULTRA  TEST STRIPS NOT THE FREE SYTLE.    SENT TO     [facility] - [place], [place] - [address] AT [facility] - [phone]  - [phone] (Phone)  [phone] (Fax)      PATIENT HAS LESS THAN 50 STRIPS PATIENT TEST 6-10 TIMES A DAY.

## 2018-06-22 NOTE — TELEPHONE ENCOUNTER
MEDICATION SENT TO PATIENT [facility] AT PATIENT REQUEST        ----- Message from Britni Nguyen sent at 6/22/2018  9:43 AM EDT -----  Contact: PATIENT  PATIENT NEEDS SCRIPT OF losartan-hydrochlorothiazide (HYZAAR) 100-12.5 MG TO GO TO     [address] AT [facility] [address] [facility] - [phone]  - [phone] (Phone)  [phone] (Fax)    PATIENT HAS ABOUT 5 DAYS LEFT

## 2018-07-05 RX ORDER — ZOLPIDEM TARTRATE 12.5 MG/1
TABLET, FILM COATED, EXTENDED RELEASE ORAL
Qty: 30 TABLET | Refills: 0 | OUTPATIENT
Start: 2018-07-05

## 2018-07-12 ENCOUNTER — TELEPHONE (OUTPATIENT)
Dept: ENDOCRINOLOGY | Age: 53
End: 2018-07-12

## 2018-07-12 RX ORDER — ZOLPIDEM TARTRATE 12.5 MG/1
TABLET, FILM COATED, EXTENDED RELEASE ORAL
Qty: 30 TABLET | Refills: 0 | Status: SHIPPED | OUTPATIENT
Start: 2018-07-12 | End: 2018-08-15 | Stop reason: SDUPTHER

## 2018-07-12 NOTE — TELEPHONE ENCOUNTER
----- Message from Dali Wilkerson sent at 7/11/2018  4:28 PM EDT -----  Contact: PATIENT   PATIENT   REQUEST TO REFILL ISSUES WITH MEDICATION:  MEDICATION:   zolpidem CR (AMBIEN CR) 12.5 MG CR tablet   MESSAGE:  PATIENT HAS CALLED IN REGARD TO GETTING THE ABOVE REFILLED. PATIENT IS ASKING A CALL BACK IF WE CAN GET THIS MEDICATION REFILLED     PHONE NUMBER: [phone]

## 2018-07-12 NOTE — TELEPHONE ENCOUNTER
AMBEIN 12.5 MG TABLET  1 TABLET AT BEDTIME PRN   WAS CALLED INTO PATIENT [facility] THIS WILL BE THE LAST REFILL ALL OTHER REFILL WILL HAVE TO GO TO PCP

## 2018-07-12 NOTE — TELEPHONE ENCOUNTER
Last ov 5/25/18  Last refill  6/8/18  ROXY 7/12/18      PATIENT STATED THAT SHE IS STILL WAITING ON APPT. WITH PCP SHE HAS ONE COMING UP BUT SHE OUT OF THE MEDICATION.

## 2018-07-19 ENCOUNTER — OFFICE VISIT (OUTPATIENT)
Dept: INTERNAL MEDICINE | Facility: CLINIC | Age: 53
End: 2018-07-19

## 2018-07-19 ENCOUNTER — APPOINTMENT (OUTPATIENT)
Dept: WOMENS IMAGING | Facility: HOSPITAL | Age: 53
End: 2018-07-19

## 2018-07-19 VITALS
SYSTOLIC BLOOD PRESSURE: 120 MMHG | OXYGEN SATURATION: 100 % | BODY MASS INDEX: 23.49 KG/M2 | HEIGHT: 65 IN | DIASTOLIC BLOOD PRESSURE: 82 MMHG | WEIGHT: 141 LBS | HEART RATE: 85 BPM

## 2018-07-19 DIAGNOSIS — Z12.12 SCREENING FOR COLORECTAL CANCER: ICD-10-CM

## 2018-07-19 DIAGNOSIS — Z00.00 HEALTH CARE MAINTENANCE: ICD-10-CM

## 2018-07-19 DIAGNOSIS — F41.9 ANXIETY: ICD-10-CM

## 2018-07-19 DIAGNOSIS — Z23 NEED FOR 23-POLYVALENT PNEUMOCOCCAL POLYSACCHARIDE VACCINE: Primary | ICD-10-CM

## 2018-07-19 DIAGNOSIS — M47.22 CERVICAL RADICULOPATHY DUE TO DEGENERATIVE JOINT DISEASE OF SPINE: ICD-10-CM

## 2018-07-19 DIAGNOSIS — M54.2 CERVICALGIA: ICD-10-CM

## 2018-07-19 DIAGNOSIS — K76.0 FATTY INFILTRATION OF LIVER: ICD-10-CM

## 2018-07-19 DIAGNOSIS — Z12.11 SCREENING FOR COLORECTAL CANCER: ICD-10-CM

## 2018-07-19 DIAGNOSIS — E78.2 MIXED HYPERLIPIDEMIA: ICD-10-CM

## 2018-07-19 DIAGNOSIS — I10 ESSENTIAL HYPERTENSION: ICD-10-CM

## 2018-07-19 PROCEDURE — 90471 IMMUNIZATION ADMIN: CPT | Performed by: INTERNAL MEDICINE

## 2018-07-19 PROCEDURE — 90732 PPSV23 VACC 2 YRS+ SUBQ/IM: CPT | Performed by: INTERNAL MEDICINE

## 2018-07-19 PROCEDURE — 99213 OFFICE O/P EST LOW 20 MIN: CPT | Performed by: INTERNAL MEDICINE

## 2018-07-19 PROCEDURE — 72040 X-RAY EXAM NECK SPINE 2-3 VW: CPT | Performed by: INTERNAL MEDICINE

## 2018-07-19 PROCEDURE — 72050 X-RAY EXAM NECK SPINE 4/5VWS: CPT | Performed by: RADIOLOGY

## 2018-07-19 PROCEDURE — 99396 PREV VISIT EST AGE 40-64: CPT | Performed by: INTERNAL MEDICINE

## 2018-07-19 RX ORDER — INSULIN GLARGINE 100 [IU]/ML
22 INJECTION, SOLUTION SUBCUTANEOUS DAILY
COMMUNITY
End: 2018-08-02

## 2018-07-19 RX ORDER — DESVENLAFAXINE SUCCINATE 50 MG/1
50 TABLET, EXTENDED RELEASE ORAL DAILY
Qty: 30 TABLET | Refills: 11 | Status: SHIPPED | OUTPATIENT
Start: 2018-07-19 | End: 2018-09-04 | Stop reason: SDUPTHER

## 2018-07-19 NOTE — PROGRESS NOTES
"Subjective   Ivon Ortega is a 53 y.o. female. Here to establish as a new patient.    History of Present Illness   Ivon Ortega 53 y.o. female who is here to establish as a new patient. In a past had been to see .  Past medical and surgical history, family and social history was obtained by me in the interview and recorded in the EHR. In a past patient had been to see another [facility] provider. [facility] records reviewed, discussed with patient, and updated.     /82 (BP Location: Left arm, Patient Position: Sitting)   Pulse 85   Ht 165.1 cm (65\")   Wt 64 kg (141 lb)   SpO2 100%   BMI 23.46 kg/m²   Patient is here for yearly CPE. Last CPE was  1 year ago.  PMH, SH and FH reviewed. Changes in the FH: none .  Patient rates her own health as: good.  Tobacco use: in remote past, as per SH.  Alcohol use: 2-3 days a week.  Recreational drugs use: none  Medication list rewieved.  Diet: low carb.  Exercise: none.  Marital status: single.   Employment: full time. Works for [employer] at the [employer].  Patient rates her stress level as: high. Weaned her self off Cymbalta, now worries constantly.  Dental health: good. Brushes teeth 2 times a day, flosses 1 time a day . Dental visits: 2 times a year .  Vision correction: reading glasses  Hearing: normal.    Recent vaccinations:   Flu  is up to date and recommended yearly  Tdap  is up to date  Zostavax discussed and recommended to get Shindrix at [facility]    Patient is postmenopausal. Past pregnancies: none. Currently patient is on no HRT .    /82 (BP Location: Left arm, Patient Position: Sitting)   Pulse 85   Ht 165.1 cm (65\")   Wt 64 kg (141 lb)   SpO2 100%   BMI 23.46 kg/m²   Ivon Ortega53 y.o.female presents to the office c/o chronic neck pain and stiffness.  S-ms started 8 years ago. Precipitating event: trauma at work. Recently she had a flare, that made her go to the [facility]. Patient describes stiffness in the neck and discomfort, the " pain and stiffness are constant. Intensity of the s-ms: 10 /10. Patient reports radiation into the shoulders and between shoulder blades. States that the discomfort had been constant.   Patient  has had similar episode in a past.   Treatments tried: massages and chiroparactor with minimal relief.  PMH is significant for anxiety, had stopped Duloxetine on her own.     Patient has DM type I and is under the care of . She had discontinued her pump on her own due to some concerns, now she is running out of her medication. Had been using same long acting insulin injections.    Current Outpatient Prescriptions:   •  aspirin 81 MG tablet, Take 81 mg by mouth., Disp: , Rfl:   •  atorvastatin (LIPITOR) 20 MG tablet, Take 1 tablet by mouth Daily., Disp: 30 tablet, Rfl: 11  •  glucose blood (FREESTYLE TEST STRIPS) test strip, USE TO TEST BLOOD SUGAR 6 TIMES DAILY, Disp: 600 each, Rfl: 3  •  glucose blood test strip, USE TO TEST BLOOD SUGAR 6 TIMES DAILY  ONE TOUCH ULTRA TEST STRIPS, Disp: 600 each, Rfl: 3  •  insulin aspart (NOVOLOG FLEXPEN) 100 UNIT/ML solution pen-injector sc pen, Inject  under the skin. One unit if blood sugar above 130, Disp: , Rfl:   •  insulin glargine (LANTUS) 100 UNIT/ML injection, Inject 22 Units under the skin Daily., Disp: , Rfl:   •  losartan-hydrochlorothiazide (HYZAAR) 100-12.5 MG per tablet, TAKE ONE TABLET BY MOUTH DAILY, Disp: 30 tablet, Rfl: 1  •  MILK THISTLE PO, Take  by mouth Daily., Disp: , Rfl:   •  zolpidem CR (AMBIEN CR) 12.5 MG CR tablet, TAKE ONE TABLET BY MOUTH EVERY NIGHT AT BEDTIME AS NEEDED FOR SLEEP  (LAST REFILL, NEED TO CALL PCP), Disp: 30 tablet, Rfl: 0  The following portions of the patient's history were reviewed and updated as appropriate: allergies, current medications, past family history, past medical history, past social history, past surgical history and problem list.    Review of Systems   Constitutional: Negative for chills and fever.   HENT: Negative for  postnasal drip, sinus pressure and sore throat.    Eyes: Negative for pain and itching.   Respiratory: Negative for cough and chest tightness.    Cardiovascular: Negative for chest pain and leg swelling.   Gastrointestinal: Negative for abdominal pain, blood in stool and diarrhea.   Endocrine: Negative for cold intolerance and heat intolerance.   Genitourinary: Negative for difficulty urinating and flank pain.   Musculoskeletal: Positive for neck pain. Negative for back pain.   Skin: Negative for color change and rash.   Neurological: Negative for dizziness, weakness and headaches.   Hematological: Negative for adenopathy. Does not bruise/bleed easily.   Psychiatric/Behavioral: Negative for sleep disturbance. The patient is not nervous/anxious.        Objective   Physical Exam   Constitutional: She is oriented to person, place, and time. She appears well-developed and well-nourished. No distress.   HENT:   Head: Normocephalic and atraumatic.   Right Ear: External ear normal.   Left Ear: External ear normal.   Nose: Nose normal.   Mouth/Throat: Oropharynx is clear and moist. No oropharyngeal exudate.   Eyes: Pupils are equal, round, and reactive to light. Conjunctivae and EOM are normal. Right eye exhibits no discharge. Left eye exhibits no discharge. No scleral icterus.   Neck: Normal range of motion. Neck supple. No JVD present. No thyromegaly present.   Cardiovascular: Normal rate, regular rhythm, S1 normal, S2 normal, normal heart sounds and intact distal pulses.  Exam reveals no gallop and no friction rub.    No murmur heard.  Pulmonary/Chest: Effort normal and breath sounds normal. No respiratory distress. She has no decreased breath sounds. She has no wheezes. She has no rhonchi. She has no rales. Right breast exhibits no inverted nipple, no mass, no nipple discharge, no skin change and no tenderness. Left breast exhibits no inverted nipple, no mass, no nipple discharge, no skin change and no tenderness.  Breasts are symmetrical.   Abdominal: Soft. Bowel sounds are normal. She exhibits no distension and no mass. There is no tenderness. There is no rebound and no guarding.   Central fat distribution   Musculoskeletal: She exhibits tenderness (on palpation of the lower posterior neck). She exhibits no edema.   Lymphadenopathy:        Head (right side): No submental, no submandibular, no preauricular, no posterior auricular and no occipital adenopathy present.        Head (left side): No submental, no submandibular, no preauricular, no posterior auricular and no occipital adenopathy present.     She has no cervical adenopathy.        Right cervical: No superficial cervical, no deep cervical and no posterior cervical adenopathy present.       Left cervical: No superficial cervical, no deep cervical and no posterior cervical adenopathy present.     She has no axillary adenopathy.        Right: No supraclavicular adenopathy present.        Left: No supraclavicular adenopathy present.   Neurological: She is alert and oriented to person, place, and time. She has normal reflexes. She displays normal reflexes. No cranial nerve deficit. She exhibits normal muscle tone. Coordination normal.   Reflex Scores:       Bicep reflexes are 2+ on the right side and 2+ on the left side.       Patellar reflexes are 2+ on the right side and 2+ on the left side.  Skin: Skin is warm. No rash noted. She is not diaphoretic. No erythema.   Fair skin, tattoo over the left shoulder   Psychiatric: She has a normal mood and affect. Her behavior is normal. Thought content normal.   Vitals reviewed.      Assessment/Plan   Ivon was seen today for annual exam.    Diagnoses and all orders for this visit:    Need for 23-polyvalent pneumococcal polysaccharide vaccine  -     Pneumococcal Polysaccharide Vaccine 23-Valent Greater Than or Equal To 3yo Subcutaneous / IM    Health care maintenance    Screening for colorectal cancer  -     Cologuard - Stool,  "Per Rectum; Future    Essential hypertension    Mixed hyperlipidemia    Anxiety  -     desvenlafaxine (PRISTIQ) 50 MG 24 hr tablet; Take 1 tablet by mouth Daily.    Fatty infiltration of liver    Cervicalgia  -     XR Spine Cervical 2 View          Risk evaluation:  1. Cardiovascular risk factors: hyperlipidemia, diabetes mellitus   2. Diabetes risk factors: patient has diabetes and being treated.  3. Cancer risk factors: nulliparity, h/o tocacco smoking and diabetes.  4. Risky behavior: none. Use of seat belts: regular. Use of sunscreens: regular. SPF 50.   Tattoos: one.  H/o blood transfusions/organ transplants before 1992 or clotting factor transfusion before 1987: none.     Prevention:  Cholesterol test  up to date. Cholesterol is well controlled by medication..  Blood sugar test up to date. Fasting blood sugar Patient has diabetes and is under the care of endocrinologist..  Hep C testing (for patients born [id number]): discussed and recommended, will proceed with testing..  Mammogram up to date. Recommended frequency and time of the next screening per GYN.. Breast self exams recommended once a month.  Colonoscopy: Cologuard tests discussed and offered, patient agrees to be tested.If not covered by her insurance, will refer for C-scope.  PAP smear : up to date. Recommendations per GYN..  DEXA : up to date. Recommendations per GYN..      Hyperlipidemia - well controlled with current  medication and low fat diet. Target LDL is  < 70 mg/dl (\"very high\" risk for CHD). Patient's 70, much improved with use of medication.. Normal liver function tests. Continue same medication and diet.Regular exercise recommended.  Neck pain - most likely musculoskeletal, but I am sure that her stress is contributing. Recommended to apply over the counter Salonpas patches to the lower neck. Will try to relieve her stress and reevaluate. Use heat on the neck.  Anxiety - worse after she weaned herself off Cymbalta. Needs exercise. Tired " and failed several medications in a past. Will try Pristiq 50 mg a day. Reevaluate in a month.  HTN - well controlled  with current medication. Reminded of the importance of low salt diet. Normal kidney tests and electrolytes. Continue same treatment.  DM type I with h/o DKA a year ago - Given samples of Humalog pen, I had scheduled appointment with her endo on 08/02/2018 - patient will reschedule as she needs morning appointments.  Fatty liver - explained, needs to be careful with alcohol.

## 2018-07-19 NOTE — PATIENT INSTRUCTIONS
"Risk evaluation:  1. Cardiovascular risk factors: hyperlipidemia, diabetes mellitus   2. Diabetes risk factors: patient has diabetes and being treated.  3. Cancer risk factors: nulliparity, h/o tocacco smoking and diabetes.  4. Risky behavior: none. Use of seat belts: regular. Use of sunscreens: regular. SPF 50.   Tattoos: one.  H/o blood transfusions/organ transplants before 1992 or clotting factor transfusion before 1987: none.     Prevention:  Cholesterol test  up to date. Cholesterol is well controlled by medication..  Blood sugar test up to date. Fasting blood sugar Patient has diabetes and is under the care of endocrinologist..  Hep C testing (for patients born [id number]): discussed and recommended, will proceed with testing..  Mammogram up to date. Recommended frequency and time of the next screening per GYN.. Breast self exams recommended once a month.  Colonoscopy: Cologuard tests discussed and offered, patient agrees to be tested.If not covered by her insurance, will refer for C-scope.  PAP smear : up to date. Recommendations per GYN..  DEXA : up to date. Recommendations per GYN..      Hyperlipidemia - well controlled with current  medication and low fat diet. Target LDL is  < 70 mg/dl (\"very high\" risk for CHD). Patient's 70, much improved with use of medication.. Normal liver function tests. Continue same medication and diet.Regular exercise recommended.  Neck pain - most likely musculoskeletal, but I am sure that her stress is contributing. Recommended to apply over the counter Salonpas patches to the lower neck. Will try to relieve her stress and reevaluate. Use heat on the neck.  Anxiety - worse after she weaned herself off Cymbalta. Needs exercise. Tired and failed several medications in a past. Will try Pristiq 50 mg a day. Reevaluate in a month.  HTN - well controlled  with current medication. Reminded of the importance of low salt diet. Normal kidney tests and electrolytes. Continue same " treatment.  DM type I with h/o DKA a year ago - Given samples of Humalog pen, I had scheduled appointment with her endo on 08/02/2018 - patient will reschedule as she needs morning appointments.  Fatty liver - explained, needs to be careful with alcohol.

## 2018-07-23 PROBLEM — M47.22 CERVICAL RADICULOPATHY DUE TO DEGENERATIVE JOINT DISEASE OF SPINE: Status: ACTIVE | Noted: 2018-07-23

## 2018-07-23 NOTE — PROGRESS NOTES
Please call patient: severe osteoarthritis in the lower C-spine, at C5-6 level. I had ordered MRI of the spine at [facility]. MRI is done in the narrow tunnel - is she claustrophobic, would she need medication before getting a test done?

## 2018-07-24 ENCOUNTER — TELEPHONE (OUTPATIENT)
Dept: INTERNAL MEDICINE | Facility: CLINIC | Age: 53
End: 2018-07-24

## 2018-07-24 NOTE — TELEPHONE ENCOUNTER
I spoke with patient to inform her of the MRI that was ordered as a result of her xrays that were done in our office on 7/19. I read the impression from the radiologist to her and let her know a letter was being sent to her with the findings. I also gave her the information for the appt that I made for her with [facility].

## 2018-07-31 ENCOUNTER — TELEPHONE (OUTPATIENT)
Dept: INTERNAL MEDICINE | Facility: CLINIC | Age: 53
End: 2018-07-31

## 2018-07-31 NOTE — TELEPHONE ENCOUNTER
----- Message from Cherrie Mccann sent at 7/31/2018 11:14 AM EDT -----  Pt had MRI cervical spine on 7/25/2018 at [facility].  Calling for results and how to proceed.  We did not have results in the chart.  I called [facility] to have them fax results ASAP    Pt# [phone]

## 2018-07-31 NOTE — TELEPHONE ENCOUNTER
Tried to call patient x 3 this afternoon, got only voice mail every time, will try tomorrow.Will refer to neurosurgeon.

## 2018-08-02 ENCOUNTER — TELEPHONE (OUTPATIENT)
Dept: ENDOCRINOLOGY | Age: 53
End: 2018-08-02

## 2018-08-02 ENCOUNTER — OFFICE VISIT (OUTPATIENT)
Dept: ENDOCRINOLOGY | Age: 53
End: 2018-08-02

## 2018-08-02 VITALS
BODY MASS INDEX: 23.82 KG/M2 | SYSTOLIC BLOOD PRESSURE: 128 MMHG | HEIGHT: 65 IN | WEIGHT: 143 LBS | OXYGEN SATURATION: 96 % | HEART RATE: 100 BPM | DIASTOLIC BLOOD PRESSURE: 80 MMHG

## 2018-08-02 DIAGNOSIS — E78.2 MIXED HYPERLIPIDEMIA: ICD-10-CM

## 2018-08-02 DIAGNOSIS — E10.65 TYPE 1 DIABETES MELLITUS WITH HYPERGLYCEMIA (HCC): Primary | ICD-10-CM

## 2018-08-02 PROCEDURE — 99214 OFFICE O/P EST MOD 30 MIN: CPT | Performed by: INTERNAL MEDICINE

## 2018-08-02 NOTE — TELEPHONE ENCOUNTER
CALLED [facility] TO LET THEM KNOW THAT PATIENT WILL HAVE A MAX DOSE OF 50 UNITS DAILY    ----- Message from Britni eKn sent at 8/2/2018  1:30 PM EDT -----  Contact: TAYE KOTHARI [address] - [phone]  - [phone] (Phone)  [phone] (Fax)    CALLED IN REGARDS TO PATIENT PRESCRIPTION insulin aspart (novoLOG FLEXPEN) 100 UNIT/ML solution pen-injector sc pen. PHARMACIST NEEDS TO KNOW WHAT IS THE MAX THE PATIENT CAN TAKE DAILY.

## 2018-08-02 NOTE — PROGRESS NOTES
53 y.o.    Patient Care Team:  Provider, No Known as PCP - General    Chief Complaint:    FOLLOW UP/ TYPE 1 DIABETES MELLITUS  Subjective     HPI  Ivon Ortega,53 y.o. WF is here as follow up for the management of type 1 dm.      Type 1 dm - Diagnosed about 11 years ago. Insulin was started about 10 years ago. Pt stopped using the insulin pump about 5 - 6 weeks ago as her BG have been runing high and low and the sensor was constantly going off with alerts. Pt stopped using the sensor at around the same time.   Today in clinic pt reports she is doing lantus 24 units in the afternoon at around 1: 30 pm. Novolog 1 units for 7 gm for BF, 1 unit for 6 gm for lunch, 1 unit for 8 gm for dinner, 1 unit for 40 above 130 mg/dl.   Checks BG 5 - 6 times a day.   She did get her glucometer for me to review.   Lowest Bg was 27 mg/dl and she does have hypoglycemic awareness. She reports she feels weak, dizzy and cannot think straight.   Highest BG was 250 mg/dl and she forgot lantus that day.   Last eye exam was 1 week ago and no hx of dm retinopathy.   No c/o tingling and numbness in her feet, no ulcers or wounds on her feet.   She is physically active and her weight stable.   She does report that she is comfortable counting carbohydrates.   On ARB.  Last DKA was in Aug 2017 and that was her first episode of DKA.      HLP   On liptor 20 mg po daily.     Chronic back pain - seeing .     Reviewed primary care physician's/consulting physician documentation and lab results :     Interval History      The following portions of the patient's history were reviewed and updated as appropriate: allergies, current medications, past family history, past medical history, past social history, past surgical history and problem list.    Past Medical History:   Diagnosis Date   • Arthralgia of hip 2/10/2016   • Diabetes mellitus (CMS/HCC)    • Esophageal candidiasis (CMS/HCC)    • Hyperlipidemia    • Hypertension    • Insomnia    •  Pregnancy          Family History   Problem Relation Age of Onset   • Adopted: Yes     Social History     Social History   • Marital status:      Spouse name: N/A   • Number of children: N/A   • Years of education: N/A     Occupational History   • Not on file.     Social History Main Topics   • Smoking status: Former Smoker     Packs/day: 1.00     Years: 5.00     Types: Cigarettes     Quit date:    • Smokeless tobacco: Never Used   • Alcohol use Yes   • Drug use: No   • Sexual activity: Not on file     Other Topics Concern   • Not on file     Social History Narrative   • No narrative on file     Allergies   Allergen Reactions   • Ampicillin Diarrhea       Current Outpatient Prescriptions:   •  aspirin 81 MG tablet, Take 81 mg by mouth., Disp: , Rfl:   •  atorvastatin (LIPITOR) 20 MG tablet, Take 1 tablet by mouth Daily., Disp: 30 tablet, Rfl: 11  •  desvenlafaxine (PRISTIQ) 50 MG 24 hr tablet, Take 1 tablet by mouth Daily., Disp: 30 tablet, Rfl: 11  •  glucose blood (FREESTYLE TEST STRIPS) test strip, USE TO TEST BLOOD SUGAR 6 TIMES DAILY, Disp: 600 each, Rfl: 3  •  glucose blood test strip, USE TO TEST BLOOD SUGAR 6 TIMES DAILY  ONE TOUCH ULTRA TEST STRIPS, Disp: 600 each, Rfl: 3  •  insulin aspart (novoLOG FLEXPEN) 100 UNIT/ML solution pen-injector sc pen, Novolog 1 units for 7 gm for BF, 1 unit for 6 gm for lunch, 1 unit for 8 gm for dinner., Disp: 5 pen, Rfl: 3  •  losartan-hydrochlorothiazide (HYZAAR) 100-12.5 MG per tablet, TAKE ONE TABLET BY MOUTH DAILY, Disp: 30 tablet, Rfl: 1  •  MILK THISTLE PO, Take  by mouth Daily., Disp: , Rfl:   •  zolpidem CR (AMBIEN CR) 12.5 MG CR tablet, TAKE ONE TABLET BY MOUTH EVERY NIGHT AT BEDTIME AS NEEDED FOR SLEEP  (LAST REFILL, NEED TO CALL PCP), Disp: 30 tablet, Rfl: 0  •  insulin degludec (TRESIBA FLEXTOUCH) 100 UNIT/ML solution pen-injector injection, Inject 24 Units under the skin into the appropriate area as directed Every Night., Disp: 5 pen, Rfl:  "3        Review of Systems   Constitutional: Positive for fatigue. Negative for appetite change and fever.   Eyes: Negative for visual disturbance.   Respiratory: Negative for shortness of breath.    Cardiovascular: Negative for palpitations and leg swelling.   Gastrointestinal: Negative for abdominal pain and vomiting.   Endocrine: Negative for polydipsia and polyuria.   Musculoskeletal: Positive for neck pain. Negative for joint swelling.   Skin: Negative for rash.   Neurological: Positive for weakness (arms). Negative for numbness.   Psychiatric/Behavioral: Negative for behavioral problems.       Objective       Vitals:    08/02/18 1105   BP: 128/80   Pulse: 100   SpO2: 96%   Weight: 64.9 kg (143 lb)   Height: 165.1 cm (65\")     Body mass index is 23.8 kg/m².      Physical Exam   Constitutional: She is oriented to person, place, and time. She appears well-nourished.   HENT:   Head: Normocephalic and atraumatic.   Eyes: Conjunctivae and EOM are normal. No scleral icterus.   Neck: Normal range of motion. Neck supple. No thyromegaly present.   Cardiovascular: Normal rate and normal heart sounds.  Exam reveals no friction rub.    No murmur heard.  Pulmonary/Chest: Effort normal and breath sounds normal. No stridor. She has no wheezes. She has no rales.   Abdominal: Soft. Bowel sounds are normal. She exhibits no distension. There is no tenderness.   Musculoskeletal: She exhibits no edema or tenderness.   Lymphadenopathy:     She has no cervical adenopathy.   Neurological: She is alert and oriented to person, place, and time.   Skin: Skin is warm and dry. She is not diaphoretic.   Psychiatric: She has a normal mood and affect.   Vitals reviewed.    Results Review:     I reviewed the patient's new clinical results and mentioned them above in HPI and in plan as well.    Medical records reviewed  Summary: done      Results Encounter on 05/02/2018   Component Date Value Ref Range Status   • Hemoglobin A1C 05/23/2018 7.9* " 4.8 - 5.6 % Final    Comment:          Pre-diabetes: 5.7 - 6.4           Diabetes: >6.4           Glycemic control for adults with diabetes: <7.0     • Glucose 05/23/2018 187* 65 - 99 mg/dL Final    Comment: Specimen received in contact with cells. No visible hemolysis  present. However GLUC may be decreased and K increased. Clinical  correlation indicated.     • BUN 05/23/2018 10  6 - 24 mg/dL Final   • Creatinine 05/23/2018 0.75  0.57 - 1.00 mg/dL Final   • eGFR Non African Am 05/23/2018 90  >59 mL/min/1.73 Final   • eGFR African Am 05/23/2018 104  >59 mL/min/1.73 Final   • BUN/Creatinine Ratio 05/23/2018 13  9 - 23 Final   • Sodium 05/23/2018 142  134 - 144 mmol/L Final   • Potassium 05/23/2018 4.0  3.5 - 5.2 mmol/L Final    Comment: Specimen received in contact with cells. No visible hemolysis  present. However GLUC may be decreased and K increased. Clinical  correlation indicated.     • Chloride 05/23/2018 101  96 - 106 mmol/L Final   • Total CO2 05/23/2018 24  18 - 29 mmol/L Final   • Calcium 05/23/2018 9.8  8.7 - 10.2 mg/dL Final   • Total Cholesterol 05/23/2018 159  100 - 199 mg/dL Final   • Triglycerides 05/23/2018 83  0 - 149 mg/dL Final   • HDL Cholesterol 05/23/2018 70  >39 mg/dL Final   • VLDL Cholesterol 05/23/2018 17  5 - 40 mg/dL Final   • LDL Cholesterol  05/23/2018 72  0 - 99 mg/dL Final   • Creatinine, Urine 05/23/2018 37.8  Not Estab. mg/dL Final   • Microalbumin, Urine 05/23/2018 <3.0  Not Estab. ug/mL Final   • Microalbumin/Creatinine Ratio 05/23/2018 <7.9  0.0 - 30.0 mg/g creat Final   • TSH 05/23/2018 3.870  0.450 - 4.500 uIU/mL Final   • Vitamin B-12 05/23/2018 760  232 - 1,245 pg/mL Final   • Folate 05/23/2018 19.4  >3.0 ng/mL Final    Comment: A serum folate concentration of less than 3.1 ng/mL is  considered to represent clinical deficiency.     • 25 Hydroxy, Vitamin D 05/23/2018 34.8  30.0 - 100.0 ng/mL Final    Comment: Vitamin D deficiency has been defined by the [place] of  Medicine  and an Endocrine Society practice guideline as a  level of serum 25-OH vitamin D less than 20 ng/mL (1,2).  The Endocrine Society went on to further define vitamin D  insufficiency as a level between 21 and 29 ng/mL (2).  1. IOM ([place] of [employer]). 2010. Dietary reference     intakes for calcium and D. [place]: The     National Academies Press.  2. Cat MF, [place] NEFF, Donna TORRES, et al.     Evaluation, treatment, and prevention of vitamin D     deficiency: an Endocrine Society clinical practice     guideline. JCEM. 2011 Jul; 96(3):7921-30.     • Interpretation 05/23/2018 Note   Final    Supplemental report is available.   • PDF Image 05/23/2018 Not applicable   Final     Lab Results   Component Value Date    HGBA1C 7.9 (H) 05/23/2018    HGBA1C 8.10 (H) 12/14/2017    HGBA1C 8.39 (H) 08/23/2017     Lab Results   Component Value Date    MICROALBUR <3.0 05/23/2018    CREATININE 0.75 05/23/2018     Imaging Results (most recent)     None                Assessment and Plan:    Ivon was seen today for diabetes.    Diagnoses and all orders for this visit:    Type 1 diabetes mellitus with hyperglycemia (CMS/[facility])  -     Hemoglobin A1c  -     Basic Metabolic Panel  -     Hemoglobin A1c; Future  -     Basic Metabolic Panel; Future  -     Lipid Panel; Future  -     TSH; Future  -     Vitamin B12 & Folate; Future  -     Vitamin D 25 Hydroxy; Future    Mixed hyperlipidemia  -     Hemoglobin A1c  -     Basic Metabolic Panel  -     Hemoglobin A1c; Future  -     Basic Metabolic Panel; Future  -     Lipid Panel; Future  -     TSH; Future  -     Vitamin B12 & Folate; Future  -     Vitamin D 25 Hydroxy; Future    Other orders  -     insulin aspart (novoLOG FLEXPEN) 100 UNIT/ML solution pen-injector sc pen; Novolog 1 units for 7 gm for BF, 1 unit for 6 gm for lunch, 1 unit for 8 gm for dinner.  -     insulin degludec (TRESIBA FLEXTOUCH) 100 UNIT/ML solution pen-injector injection; Inject 24 Units under the skin  "into the appropriate area as directed Every Night.    Type 1 diabetes mellitus-uncontrolled  Patient discontinued the insulin pump as it was causing her to have extremely high and low blood sugars.  She is not also using her sensor because it has been alerting her quite a bit.  I had to  the patient that she had to use the sensor because of her extreme low blood sugar issues and her decreased hypoglycemic awareness.  Patient would think about this.  Will start patient on tresiba 24 units at bedtime  Will continue NovoLog 1 unit for 7 g of carbohydrates with breakfast, 1 unit for 6 g of carbohydrates for lunch, 1 unit for 8 g of carbohydrates for dinner  Will change the NovoLog 1 unit for 50 above 1 50 mg/dL with each meal as a sliding scale.    Hyperlipidemia  Continue Lipitor 20 mg oral daily.      Reviewed Lab results with the patient.             Shyam Zuniga MD  08/02/18    EMR Dragon / transcription disclaimer:     \"Dictated utilizing Dragon dictation\".  "

## 2018-08-02 NOTE — PATIENT INSTRUCTIONS
Tresiba 24 units at bed time for now, but if having low BG decrease the dose to 20 units at bed time.   Novolog will continue the same.   Novolog sliding scale - with each meal and snack.   1 unit for 50 above 150 mg/dl.

## 2018-08-03 PROBLEM — M81.0 OSTEOPOROSIS, POSTMENOPAUSAL: Status: ACTIVE | Noted: 2018-08-03

## 2018-08-03 NOTE — TELEPHONE ENCOUNTER
Spoke with patient about medication. Medication needed to be sent to the mail order      ----- Message from Dali Wilkerson sent at 8/3/2018  1:23 PM EDT -----  PATIENT HAS CALLED  IN TODAY WANTING TO DIRECTLY SPEAK TO YOU. PATIENT DID NOT TELL OR WANTED TO TELL ME WHAT IT IS IN REGARDS TO       PATIENT IS WANTING A CALL BACK    [phone]

## 2018-08-13 RX ORDER — ZOLPIDEM TARTRATE 12.5 MG/1
TABLET, FILM COATED, EXTENDED RELEASE ORAL
Qty: 30 TABLET | Refills: 0 | OUTPATIENT
Start: 2018-08-13

## 2018-08-13 NOTE — TELEPHONE ENCOUNTER
Patient is almost of of ambien. She normally recieves refills through Dr Zuniga, would like to know if Dr Sousa could take over prescribing this medication as it had been discussed in patients last visit. She is down to 1 pill. With ambien she only recieves 4 hours of sleep nightly without she doesn't sleep.  Please advise

## 2018-08-15 ENCOUNTER — TELEPHONE (OUTPATIENT)
Dept: ENDOCRINOLOGY | Age: 53
End: 2018-08-15

## 2018-08-15 RX ORDER — ZOLPIDEM TARTRATE 12.5 MG/1
TABLET, FILM COATED, EXTENDED RELEASE ORAL
Qty: 30 TABLET | Refills: 0 | Status: SHIPPED | OUTPATIENT
Start: 2018-08-15 | End: 2018-09-04

## 2018-08-15 NOTE — TELEPHONE ENCOUNTER
zolpidem CR (AMBIEN CR) 12.5 MG CR tablet  TAKE ONE TABLET BY MOUTH EVERY NIGHT AT BEDTIME AS NEED FOR SLEEP THIS WILL BE THE LAST REFILL GIVEN MUST CONTACT PCP FOR FURTHER REFIILS  Was approved by Dr Shola Berger as a one time only refill  No further refill will be given by either Dr Shyam Zuniga or Dr Shola Núñez  All further refill must be done by PCP. Patient has been notified that no more refill will be given

## 2018-08-15 NOTE — TELEPHONE ENCOUNTER
Left voice mail for patient to let them know that dr rosario was out of the office until  9/7/18 and that she stated the the last refill she approved for the ambein was the last refill. That she needed to get her pcp to refill the medication. I did let patient know that I asked another provider for a one time refill and that she must have pcp to refill the rx      ----- Message from Vannessa Sr MA sent at 8/15/2018 10:10 AM EDT -----  PT CALLED STATING THAT SHE CALLED YESTERDAY ASKING FOR A REFILL ON AMBIEN. I INFORMED HER THAT DR. PASCAL'S LAST REFILL WAS IN July AND THAT SHE NEEDED TO CONTACT PCP. SHE STATED THAT DR. KHAN WILL NOT FILL IT, SHE IS ASKING FOR A CALL BACK REGARDLESS OF WHETHER WE WILL FILL IT OR NOT.

## 2018-08-15 NOTE — TELEPHONE ENCOUNTER
----- Message from Vannessa Sr MA sent at 8/15/2018 10:10 AM EDT -----  PT CALLED STATING THAT SHE CALLED YESTERDAY ASKING FOR A REFILL ON AMBIEN. I INFORMED HER THAT DR. PASCAL'S LAST REFILL WAS IN July AND THAT SHE NEEDED TO CONTACT PCP. SHE STATED THAT DR. KHAN WILL NOT FILL IT, SHE IS ASKING FOR A CALL BACK REGARDLESS OF WHETHER WE WILL FILL IT OR NOT.

## 2018-08-29 ENCOUNTER — TELEPHONE (OUTPATIENT)
Dept: ENDOCRINOLOGY | Age: 53
End: 2018-08-29

## 2018-08-29 DIAGNOSIS — I10 ESSENTIAL HYPERTENSION: ICD-10-CM

## 2018-08-29 RX ORDER — LOSARTAN POTASSIUM AND HYDROCHLOROTHIAZIDE 12.5; 1 MG/1; MG/1
TABLET ORAL
Qty: 30 TABLET | Refills: 2 | Status: SHIPPED | OUTPATIENT
Start: 2018-08-29 | End: 2018-10-02 | Stop reason: SDUPTHER

## 2018-08-29 NOTE — TELEPHONE ENCOUNTER
Spoke with patient to let them know that the [facility] paperwork was ready.       ----- Message from Dali Wilkerson sent at 8/28/2018 11:50 AM EDT -----  Contact: PATIENT   MESSAGE FROM THE PATIENT     MESSAGE: PATIENT HAS CALLED IN REGARDS TO GETTING HER [employer] PAPER WORKED MAILED TO HER. SHE IS STATING IT HAS BEEN A MONTH AND SHE STILL HAS NOT GOTTEN THEM BACK. MRS TSE IS WANTING A CALL BACK IN REGARDS TO THIS.     NOTE: PATIENT WAS INFORMED OUR OFFICE HAS 24-48 HOURS TO RESPOND     CALL BACK NUMBER  :  [phone]

## 2018-09-04 ENCOUNTER — OFFICE VISIT (OUTPATIENT)
Dept: INTERNAL MEDICINE | Facility: CLINIC | Age: 53
End: 2018-09-04

## 2018-09-04 VITALS
RESPIRATION RATE: 14 BRPM | HEIGHT: 65 IN | BODY MASS INDEX: 23.82 KG/M2 | WEIGHT: 143 LBS | SYSTOLIC BLOOD PRESSURE: 122 MMHG | DIASTOLIC BLOOD PRESSURE: 70 MMHG

## 2018-09-04 DIAGNOSIS — F51.01 PRIMARY INSOMNIA: ICD-10-CM

## 2018-09-04 DIAGNOSIS — F41.9 ANXIETY: Primary | ICD-10-CM

## 2018-09-04 DIAGNOSIS — M50.122 CERVICAL DISC DISORDER AT C5-C6 LEVEL WITH RADICULOPATHY: ICD-10-CM

## 2018-09-04 PROCEDURE — 99214 OFFICE O/P EST MOD 30 MIN: CPT | Performed by: INTERNAL MEDICINE

## 2018-09-04 RX ORDER — DESVENLAFAXINE 100 MG/1
100 TABLET, EXTENDED RELEASE ORAL DAILY
Qty: 30 TABLET | Refills: 11 | Status: SHIPPED | OUTPATIENT
Start: 2018-09-04 | End: 2019-09-28 | Stop reason: SDUPTHER

## 2018-09-04 NOTE — PATIENT INSTRUCTIONS
1. Anxiety - suboptimal control. Will increase the dose of Prestiq to 100 mg a day.  2. Neck pain due to C5-6 DDD with C6 nerve root compression on a right and radiculopathy - will refer to neurosurgeon, already scheduled this week.  3. Insomnia - chronic, poor control with Ambien CR - will try to change to Belsombra, slowly increase the dose from 10 mg to 15 mg and the to 20 mg at bedtime over the next month.

## 2018-09-04 NOTE — PROGRESS NOTES
"Subjective   Ivon Ortega is a 53 y.o. female.     History of Present Illness   Ivon Ortega 53 y.o. female presents today for anxiety d/o f/u. Last was seen on 07/19/2018 and on that visit medication was changed due to inadequate control.We had started Pristiq.  Patient is compliant with treatment and denies  side effects. Patient reports no change in symptoms with medication change.She seems to be much calmer, also just went throw break out of her relationship, and stayed calm.  Ivon Ortega 53 y.o. female complains of chronic insomnia. Ivon Ortega had been suffering with this problem for since the age of 12 years..  The major issue is difficulty falling asleep. Associated symptoms include anxiety.  Patient reports that insomnia had been persistent. Contributing factors include anxiety.    Patient denies snoring and apneic episodes.  Patient had tried \"everything\" in a past.  Patient had found great relief while on medication.  Often she still gets only 3-4 hours of sleep.  Patient has no h/o substance abuse.    As for the neck pain, she has appointment this week.    /70 (BP Location: Left arm, Patient Position: Sitting, Cuff Size: Adult)   Resp 14   Ht 165.1 cm (65\")   Wt 64.9 kg (143 lb)   BMI 23.80 kg/m²     Current Outpatient Prescriptions:   •  aspirin 81 MG tablet, Take 81 mg by mouth., Disp: , Rfl:   •  atorvastatin (LIPITOR) 20 MG tablet, Take 1 tablet by mouth Daily., Disp: 30 tablet, Rfl: 11  •  desvenlafaxine (PRISTIQ) 50 MG 24 hr tablet, Take 1 tablet by mouth Daily., Disp: 30 tablet, Rfl: 11  •  glucose blood (FREESTYLE TEST STRIPS) test strip, USE TO TEST BLOOD SUGAR 6 TIMES DAILY, Disp: 600 each, Rfl: 3  •  glucose blood test strip, USE TO TEST BLOOD SUGAR 6 TIMES DAILY  ONE TOUCH ULTRA TEST STRIPS, Disp: 600 each, Rfl: 3  •  insulin aspart (novoLOG FLEXPEN) 100 UNIT/ML solution pen-injector sc pen, Novolog 1 units for 7 gm for BF, 1 unit for 6 gm for lunch, 1 unit for 8 " gm for dinner.max daily 50 units, Disp: 15 pen, Rfl: 3  •  insulin degludec (TRESIBA FLEXTOUCH) 100 UNIT/ML solution pen-injector injection, Inject 24 Units under the skin into the appropriate area as directed Every Night., Disp: 10 pen, Rfl: 3  •  losartan-hydrochlorothiazide (HYZAAR) 100-12.5 MG per tablet, TAKE ONE TABLET BY MOUTH DAILY, Disp: 30 tablet, Rfl: 2  •  MILK THISTLE PO, Take  by mouth Daily., Disp: , Rfl:   •  zolpidem CR (AMBIEN CR) 12.5 MG CR tablet, TAKE ONE TABLET BY MOUTH EVERY NIGHT AT BEDTIME AS NEED FOR SLEEP  THIS WILL BE THE LAST REFILL GIVEN MUST CONTACT PCP FOR FURTHER REFIILS, Disp: 30 tablet, Rfl: 0  The following portions of the patient's history were reviewed and updated as appropriate: allergies, current medications, past family history, past medical history, past social history, past surgical history and problem list.    Review of Systems   Constitutional: Negative for chills and fever.   Eyes: Negative for pain and redness.   Respiratory: Negative for cough and shortness of breath.    Cardiovascular: Negative for chest pain and leg swelling.   Neurological: Negative for dizziness and headaches.       Objective   Physical Exam   Constitutional: She is oriented to person, place, and time. She appears well-developed and well-nourished.   HENT:   Head: Normocephalic and atraumatic.   Right Ear: Tympanic membrane, external ear and ear canal normal.   Left Ear: Tympanic membrane, external ear and ear canal normal.   Nose: Nose normal. Right sinus exhibits no maxillary sinus tenderness and no frontal sinus tenderness. Left sinus exhibits no maxillary sinus tenderness and no frontal sinus tenderness.   Mouth/Throat: Uvula is midline, oropharynx is clear and moist and mucous membranes are normal.   Eyes: Pupils are equal, round, and reactive to light. Conjunctivae and EOM are normal. Right eye exhibits no discharge. Left eye exhibits no discharge. No scleral icterus.   Neck: Neck supple. No  JVD present.   Cardiovascular: Normal rate, regular rhythm and normal heart sounds.  Exam reveals no gallop and no friction rub.    No murmur heard.  Pulmonary/Chest: Effort normal and breath sounds normal. She has no wheezes. She has no rales.   Musculoskeletal: She exhibits no edema.   Lymphadenopathy:     She has no cervical adenopathy.   Neurological: She is alert and oriented to person, place, and time. No cranial nerve deficit.   Skin: Skin is warm and dry. No rash noted.   Psychiatric: She has a normal mood and affect. Her behavior is normal.   Vitals reviewed.      Assessment/Plan   Ivon was seen today for anxiety and neck pain.    Diagnoses and all orders for this visit:    Anxiety  -     desvenlafaxine (PRISTIQ) 100 MG 24 hr tablet; Take 1 tablet by mouth Daily.    Cervical disc disorder at C5-C6 level with radiculopathy    Primary insomnia  -     Suvorexant (BELSOMRA) 10 MG tablet; Take 10 mg by mouth At Night As Needed (insomnia).    1. Anxiety - suboptimal control. Will increase the dose of Prestiq to 100 mg a day.  2. Neck pain due to C5-6 DDD with C6 nerve root compression on a right and radiculopathy - will refer to neurosurgeon, already scheduled this week.  3. Insomnia - chronic, poor control with Ambien CR - will try to change to Belsombra, slowly increase the dose from 10 mg to 15 mg and the to 20 mg at bedtime over the next month.

## 2018-09-10 ENCOUNTER — OFFICE VISIT (OUTPATIENT)
Dept: NEUROSURGERY | Facility: CLINIC | Age: 53
End: 2018-09-10

## 2018-09-10 VITALS
SYSTOLIC BLOOD PRESSURE: 130 MMHG | RESPIRATION RATE: 18 BRPM | DIASTOLIC BLOOD PRESSURE: 88 MMHG | BODY MASS INDEX: 23.99 KG/M2 | WEIGHT: 144 LBS | HEIGHT: 65 IN | HEART RATE: 74 BPM

## 2018-09-10 DIAGNOSIS — M50.30 DEGENERATIVE DISC DISEASE, CERVICAL: ICD-10-CM

## 2018-09-10 DIAGNOSIS — M54.2 CERVICALGIA: Primary | ICD-10-CM

## 2018-09-10 PROCEDURE — 99203 OFFICE O/P NEW LOW 30 MIN: CPT | Performed by: NURSE PRACTITIONER

## 2018-09-10 NOTE — PROGRESS NOTES
Subjective   Patient ID: Ivon Ortega is a 53 y.o. female is being seen for consultation today at the request of Marlen Sousa MD for neck pain. Patient presents accompanied by her friend, Jenny.     History of Present Illness  Patient resents for evaluation and treatment recommendations of neck pain. She has had chronic, intermittent neck pain for years, but in the past several years it has been more persistent and severe. It is located posterior neck and and into the top of the right shoulder blade. It is a dull pain, sometimes a heaviness. No associated numbness, tingling, weakness, incontinence. She works as [employer] agent; she has decreased to part time duties temporarily and she has noticed improvement in her discomfort. She has tried OTC pain relievers (Advil 3-4 tablets BID), chiropractic care, massage therapy. She has not had therapies to address discomfort in the last year. She does some stretching on her own.     The following portions of the patient's history were reviewed and updated as appropriate: allergies, current medications, past family history, past medical history, past social history, past surgical history and problem list.    Review of Systems   Constitutional: Negative for chills and fever.   HENT: Negative for tinnitus.    Eyes: Negative for visual disturbance.   Respiratory: Negative for cough, shortness of breath and wheezing.    Cardiovascular: Negative for chest pain and palpitations.   Gastrointestinal: Negative for abdominal pain, nausea and vomiting.   Genitourinary: Negative for difficulty urinating and enuresis.   Musculoskeletal: Positive for neck pain. Negative for neck stiffness.        Denies arm pain   Skin: Negative for rash.   Neurological: Negative for weakness and numbness.   Psychiatric/Behavioral: Negative for sleep disturbance.       Objective   Physical Exam   Constitutional: She appears well-developed and well-nourished.   Body mass index is 23.96 kg/m².     Neck:  Neck supple.   Pulmonary/Chest: Effort normal.   Musculoskeletal:        Cervical back: She exhibits tenderness (Right paraspinous and upper trapezius along the edge of the scapula). She exhibits normal range of motion, no bony tenderness, no deformity and no pain (negative Spurling).        Thoracic back: She exhibits no deformity (no scapular winging).   Neurological: She is alert. She has normal strength. She has a normal Romberg Test and a normal Tandem Gait Test. Gait normal.   Reflex Scores:       Tricep reflexes are 2+ on the right side and 2+ on the left side.       Bicep reflexes are 2+ on the right side and 2+ on the left side.       Brachioradialis reflexes are 2+ on the right side and 2+ on the left side.       Patellar reflexes are 2+ on the right side and 2+ on the left side.  Skin: Skin is warm.   Psychiatric: Her speech is normal.   Vitals reviewed.    Neurologic Exam     Mental Status   Attention: normal. Concentration: normal.   Speech: speech is normal   Level of consciousness: alert  Knowledge: good.   Normal comprehension.     Motor Exam   Muscle bulk: normal  Overall muscle tone: normal    Strength   Strength 5/5 throughout.     Sensory Exam   Right arm light touch: normal  Left arm light touch: normal  Right arm vibration: normal  Left arm vibration: normal  Temperature intact to cold bilateral upper extremities     Gait, Coordination, and Reflexes     Gait  Gait: normal    Coordination   Romberg: negative  Tandem walking coordination: normal    Reflexes   Right brachioradialis: 2+  Left brachioradialis: 2+  Right biceps: 2+  Left biceps: 2+  Right triceps: 2+  Left triceps: 2+  Right patellar: 2+  Left patellar: 2+  Right Purdy: absent  Left Purdy: absent  Able to heel and toe walk bilaterally       Assessment/Plan   Independent Review of Radiographic Studies:    MRI of the cervical spine [facility] dated July 25, 2018 was reviewed with Dr. Boss.  This reveals mild  diffuse degenerative disc disease with disc osteophyte complex at C5/C6 resulting in right moderate to severe neural foraminal narrowing.    Medical Decision Making:    Patient presents for evaluation of right neck and upper back discomfort.  His been present intermittently for multiple years but more persistent in the last several years.  She is recently decreased her work load and has noticed diminishment in her discomfort.  She has not had formal physical therapy, but she has had chiropractic care and massage therapy with some benefit.  She denies any radiculopathy or weakness.    Her exam is as noted above.  She has tenderness in the right paraspinous and upper trapezius region.  No focal neurologic deficits.  No sensory or reflexive changes.  Recommend that we begin with conservative measures including physical therapy.  She may benefit from dry needling.  If she fails to improve, we'll consider pain management possibly.  This discomfort into the upper shoulder posteriorly certainly can be related to C5/6 intervention, but we should begin with conservative care first.    Plan: Physical therapy as directed and return to office in 6 weeks for follow-up.    Ivon was seen today for neck pain.    Diagnoses and all orders for this visit:    Cervicalgia  -     Ambulatory Referral to Physical Therapy Evaluate and treat    Degenerative disc disease, cervical  -     Ambulatory Referral to Physical Therapy Evaluate and treat      Return in about 6 weeks (around 10/22/2018) for Follow-up with NP, After PT.

## 2018-10-02 ENCOUNTER — OFFICE VISIT (OUTPATIENT)
Dept: INTERNAL MEDICINE | Facility: CLINIC | Age: 53
End: 2018-10-02

## 2018-10-02 VITALS
HEART RATE: 72 BPM | DIASTOLIC BLOOD PRESSURE: 76 MMHG | SYSTOLIC BLOOD PRESSURE: 110 MMHG | BODY MASS INDEX: 23.49 KG/M2 | OXYGEN SATURATION: 98 % | WEIGHT: 141 LBS | HEIGHT: 65 IN

## 2018-10-02 DIAGNOSIS — F51.04 PSYCHOPHYSIOLOGICAL INSOMNIA: ICD-10-CM

## 2018-10-02 DIAGNOSIS — M81.0 OSTEOPOROSIS, POSTMENOPAUSAL: ICD-10-CM

## 2018-10-02 DIAGNOSIS — M54.2 CERVICALGIA: ICD-10-CM

## 2018-10-02 DIAGNOSIS — F41.9 ANXIETY: ICD-10-CM

## 2018-10-02 DIAGNOSIS — Z23 NEED FOR VACCINATION: Primary | ICD-10-CM

## 2018-10-02 DIAGNOSIS — I10 ESSENTIAL HYPERTENSION: ICD-10-CM

## 2018-10-02 PROCEDURE — 99214 OFFICE O/P EST MOD 30 MIN: CPT | Performed by: INTERNAL MEDICINE

## 2018-10-02 PROCEDURE — 90674 CCIIV4 VAC NO PRSV 0.5 ML IM: CPT | Performed by: INTERNAL MEDICINE

## 2018-10-02 PROCEDURE — 90471 IMMUNIZATION ADMIN: CPT | Performed by: INTERNAL MEDICINE

## 2018-10-02 RX ORDER — LOSARTAN POTASSIUM AND HYDROCHLOROTHIAZIDE 12.5; 1 MG/1; MG/1
1 TABLET ORAL DAILY
Qty: 90 TABLET | Refills: 3 | Status: SHIPPED | OUTPATIENT
Start: 2018-10-02 | End: 2019-10-02 | Stop reason: SDUPTHER

## 2018-10-02 NOTE — PROGRESS NOTES
"Subjective   Ivon Ortega is a 53 y.o. female.     History of Present Illness   Ivon Ortega 53 y.o. female presents today for anxiety d/o f/u. Last was seen on 09/10/2018 and on that visit medication was changed due to inadequate control.We had increased the dose of Pristiq to 100 mg.  Patient is compliant with treatment and denies  side effects. Patient reports that the level of anxiety is much less. Had been much calmer.     Ivon Ortega 53 y.o. female presents today for insomnia f/u. Last was seen on 09/10/2018 and on that visit medication was changed due to frequent nighttime awakenings while using Ambien CR.We had discontinued Ambien CR and started Belsombra, now up to 20 mg a night.  Patient is compliant with treatment and reports side effects. It still takes her long time to fall asleep, also has several \"out of the reality\" experiences after taking Belsombra.Initially was not able to sleep for 2 weeks. Tired during the day. Restarted tobacco.    Ivon Ortega 53 y.o. female presents today for chronic neck pain f/u. Last was seen on 09/10/2018 and on that visit medication was changed due to inadequate control.We had ordered neck MRI, that had shown C spine DDD and C6 nerve compression wiuth foraminal stenosis, moderate, we had refererd her to see neurosurgeon. Patient started PT and had made some progress..  Patient is compliant with PT, but is concerned about the cost.       /76 (BP Location: Left arm, Patient Position: Sitting, Cuff Size: Adult)   Pulse 72   Ht 165.1 cm (65\")   Wt 64 kg (141 lb)   SpO2 98%   BMI 23.46 kg/m²     Current Outpatient Prescriptions:   •  aspirin 81 MG tablet, Take 81 mg by mouth., Disp: , Rfl:   •  atorvastatin (LIPITOR) 20 MG tablet, Take 1 tablet by mouth Daily., Disp: 30 tablet, Rfl: 11  •  desvenlafaxine (PRISTIQ) 100 MG 24 hr tablet, Take 1 tablet by mouth Daily., Disp: 30 tablet, Rfl: 11  •  glucose blood (FREESTYLE TEST STRIPS) test strip, USE " TO TEST BLOOD SUGAR 6 TIMES DAILY, Disp: 600 each, Rfl: 3  •  glucose blood test strip, USE TO TEST BLOOD SUGAR 6 TIMES DAILY  ONE TOUCH ULTRA TEST STRIPS, Disp: 600 each, Rfl: 3  •  insulin aspart (novoLOG FLEXPEN) 100 UNIT/ML solution pen-injector sc pen, Novolog 1 units for 7 gm for BF, 1 unit for 6 gm for lunch, 1 unit for 8 gm for dinner.max daily 50 units, Disp: 15 pen, Rfl: 3  •  insulin degludec (TRESIBA FLEXTOUCH) 100 UNIT/ML solution pen-injector injection, Inject 24 Units under the skin into the appropriate area as directed Every Night., Disp: 10 pen, Rfl: 3  •  losartan-hydrochlorothiazide (HYZAAR) 100-12.5 MG per tablet, TAKE ONE TABLET BY MOUTH DAILY, Disp: 30 tablet, Rfl: 2  •  MILK THISTLE PO, Take  by mouth Daily., Disp: , Rfl:   •  Suvorexant (BELSOMRA) 10 MG tablet, Take 10 mg by mouth At Night As Needed (insomnia)., Disp: 30 tablet, Rfl: 0  •  TURMERIC PO, Take  by mouth., Disp: , Rfl:     The following portions of the patient's history were reviewed and updated as appropriate: allergies, current medications, past family history, past medical history, past social history, past surgical history and problem list.    Review of Systems   Constitutional: Negative for chills and fever.   Eyes: Negative for pain and redness.   Respiratory: Negative for cough and shortness of breath.    Cardiovascular: Negative for chest pain and leg swelling.   Neurological: Positive for headaches. Negative for dizziness.       Objective   Physical Exam   Constitutional: She is oriented to person, place, and time. She appears well-developed.   HENT:   Head: Normocephalic and atraumatic.   Right Ear: Tympanic membrane, external ear and ear canal normal.   Left Ear: Tympanic membrane, external ear and ear canal normal.   Nose: Nose normal. Right sinus exhibits no maxillary sinus tenderness and no frontal sinus tenderness. Left sinus exhibits no maxillary sinus tenderness and no frontal sinus tenderness.   Mouth/Throat:  Uvula is midline, oropharynx is clear and moist and mucous membranes are normal.   Eyes: Pupils are equal, round, and reactive to light. Conjunctivae and EOM are normal. Right eye exhibits no discharge. Left eye exhibits no discharge. No scleral icterus.   Neck: Neck supple. No JVD present.   Cardiovascular: Normal rate, regular rhythm and normal heart sounds.  Exam reveals no gallop and no friction rub.    No murmur heard.  Pulmonary/Chest: Effort normal and breath sounds normal. She has no wheezes. She has no rales.   Musculoskeletal: She exhibits tenderness (on palpation right paravertebral area at C4-6 level;). She exhibits no edema.   Lymphadenopathy:     She has no cervical adenopathy.   Neurological: She is alert and oriented to person, place, and time. No cranial nerve deficit.   Skin: Skin is warm and dry. No rash noted.   Psychiatric: She has a normal mood and affect. Her behavior is normal.   Vitals reviewed.      Assessment/Plan   Ivon was seen today for anxiety, insomnia and neck pain.    Diagnoses and all orders for this visit:    Need for vaccination  -     Flucelvax Quad=>4Years ([id number])    Essential hypertension  -     losartan-hydrochlorothiazide (HYZAAR) 100-12.5 MG per tablet; Take 1 tablet by mouth Daily.    Anxiety    Psychophysiological insomnia  -     triazolam (HALCION) 0.25 MG tablet; Take 1 tablet by mouth At Night As Needed for Sleep.    Cervicalgia    Osteoporosis, postmenopausal      1. Anxiety - better control with increased dose of Prectiq, might need further optimization of the regimen. Will follow.  2. Chronic insomnia - had not had a good response to Belsombra, also had developed side effects. Will try Triazolam. Renato report reviewed. [facility] Controlled substance agreement was signed today and will be scanned into EHR. Patient is compliant with medication and takes it according to the directions. Renato reports are being reviewed at least every 3 months., I will  continue to monitor clinical progress with regualar office visits, random pill counts and urine drug screens.   3. Cerviacalgia with C6 radiculopathy due to C spine DDD and spinal stenosis. Doing better with CT. Under the care of neurosurgeon.  4. Osteoporosis - DEXA repost explained. Patient is reluctant to use medication. Dietary Ca recommendations given. Will need regular weight bearing exercise. Will reevaluate in 2 years with another DEXA.  .

## 2018-10-02 NOTE — PATIENT INSTRUCTIONS
1. Anxiety - better control with increased dose of Prectiq, might need further optimization of the regimen. Will follow.  2. Chronic insomnia - had not had a good response to Belsombra, also had developed side effects. Will try Triazolam. Renato report reviewed. [facility] Controlled substance agreement was signed today and will be scanned into EHR. Patient is compliant with medication and takes it according to the directions. Renato reports are being reviewed at least every 3 months., I will continue to monitor clinical progress with regualar office visits, random pill counts and urine drug screens.   3. Cerviacalgia with C6 radiculopathy due to C spine DDD and spinal stenosis. Doing better with CT. Under the care of neurosurgeon.  4. Osteoporosis - DEXA repost explained. Patient is reluctant to use medication. Dietary Ca recommendations given. Will need regular weight bearing exercise. Will reevaluate in 2 years with another DEXA.

## 2018-10-31 ENCOUNTER — RESULTS ENCOUNTER (OUTPATIENT)
Dept: ENDOCRINOLOGY | Age: 53
End: 2018-10-31

## 2018-10-31 DIAGNOSIS — E78.2 MIXED HYPERLIPIDEMIA: ICD-10-CM

## 2018-10-31 DIAGNOSIS — E10.65 TYPE 1 DIABETES MELLITUS WITH HYPERGLYCEMIA (HCC): ICD-10-CM

## 2018-11-08 ENCOUNTER — OFFICE VISIT (OUTPATIENT)
Dept: INTERNAL MEDICINE | Facility: CLINIC | Age: 53
End: 2018-11-08

## 2018-11-08 VITALS
DIASTOLIC BLOOD PRESSURE: 72 MMHG | RESPIRATION RATE: 14 BRPM | BODY MASS INDEX: 23.99 KG/M2 | WEIGHT: 144 LBS | HEIGHT: 65 IN | SYSTOLIC BLOOD PRESSURE: 128 MMHG

## 2018-11-08 DIAGNOSIS — F51.04 PSYCHOPHYSIOLOGICAL INSOMNIA: ICD-10-CM

## 2018-11-08 DIAGNOSIS — F41.9 ANXIETY: ICD-10-CM

## 2018-11-08 DIAGNOSIS — K76.0 FATTY INFILTRATION OF LIVER: ICD-10-CM

## 2018-11-08 DIAGNOSIS — E10.65 TYPE 1 DIABETES MELLITUS WITH HYPERGLYCEMIA (HCC): ICD-10-CM

## 2018-11-08 DIAGNOSIS — M54.2 CERVICALGIA: Primary | ICD-10-CM

## 2018-11-08 LAB
ALBUMIN SERPL-MCNC: 4.8 G/DL (ref 3.5–5.2)
ALBUMIN/GLOB SERPL: 1.7 G/DL
ALP SERPL-CCNC: 119 U/L (ref 39–117)
ALT SERPL W P-5'-P-CCNC: 16 U/L (ref 1–33)
ANION GAP SERPL CALCULATED.3IONS-SCNC: 12.8 MMOL/L
AST SERPL-CCNC: 24 U/L (ref 1–32)
BILIRUB SERPL-MCNC: 0.4 MG/DL (ref 0.1–1.2)
BUN BLD-MCNC: 12 MG/DL (ref 6–20)
BUN/CREAT SERPL: 16 (ref 7–25)
CALCIUM SPEC-SCNC: 10 MG/DL (ref 8.6–10.5)
CHLORIDE SERPL-SCNC: 98 MMOL/L (ref 98–107)
CHOLEST SERPL-MCNC: 175 MG/DL (ref 0–200)
CO2 SERPL-SCNC: 28.2 MMOL/L (ref 22–29)
CREAT BLD-MCNC: 0.75 MG/DL (ref 0.57–1)
GFR SERPL CREATININE-BSD FRML MDRD: 80 ML/MIN/1.73
GLOBULIN UR ELPH-MCNC: 2.9 GM/DL
GLUCOSE BLD-MCNC: 200 MG/DL (ref 65–99)
HBA1C MFR BLD: 6.62 % (ref 4.8–5.6)
HDLC SERPL-MCNC: 90 MG/DL (ref 40–60)
LDLC SERPL CALC-MCNC: 75 MG/DL (ref 0–100)
LDLC/HDLC SERPL: 0.83 {RATIO}
POTASSIUM BLD-SCNC: 3.6 MMOL/L (ref 3.5–5.2)
PROT SERPL-MCNC: 7.7 G/DL (ref 6–8.5)
SODIUM BLD-SCNC: 139 MMOL/L (ref 136–145)
TRIGL SERPL-MCNC: 52 MG/DL (ref 0–150)
VLDLC SERPL-MCNC: 10.4 MG/DL (ref 5–40)

## 2018-11-08 PROCEDURE — 99214 OFFICE O/P EST MOD 30 MIN: CPT | Performed by: INTERNAL MEDICINE

## 2018-11-08 PROCEDURE — 80061 LIPID PANEL: CPT | Performed by: INTERNAL MEDICINE

## 2018-11-08 PROCEDURE — 80053 COMPREHEN METABOLIC PANEL: CPT | Performed by: INTERNAL MEDICINE

## 2018-11-08 NOTE — PROGRESS NOTES
"Subjective   Ivon Ortega is a 53 y.o. female.     History of Present Illness   Ivon Ortega 53 y.o. female presents today for neck pain due to C spine DDD f/u. Last was seen on 10/02/2018 and on that visit management was changed due to inadequate control.We had referred patient to PT.  Patient is compliant with treatment and denies  side effects. Patient states that the pain is under better control.Many days has no pain, on some days still has to take OTC Advil for the neck pain. She is able to afford coming to PT more often that once a week.    Ivon Ortega 53 y.o. female presents today for insomnia f/u. Last was seen on 10/02/2018 and on that visit medication was changed due to inadequate control.We had discontinued Belsombra and started Triazolam.  Patient is compliant with treatment and denies  side effects. Patient reports that sleep had much improved.Still feels restless at nights and her boyfriend has RLS and snores a lot.     Ivon Ortega 53 y.o. female presents today for anxiety d/o f/u. Last was seen on 10/02/2018 and on that visit medication was changed due to inadequate control.We had increased the dose of Pristiq to 100 mg.  Patient is compliant with treatment and denies  side effects. Patient states that the mood had much improved with the change in medical treatment. Patient reports that the level of anxiety is much less. Had been much calmer. She reports that she is ready to go to full time work schedule.    /72 (BP Location: Left arm, Patient Position: Sitting, Cuff Size: Adult)   Resp 14   Ht 165.1 cm (65\")   Wt 65.3 kg (144 lb)   BMI 23.96 kg/m²     Current Outpatient Prescriptions:   •  aspirin 81 MG tablet, Take 81 mg by mouth., Disp: , Rfl:   •  atorvastatin (LIPITOR) 20 MG tablet, Take 1 tablet by mouth Daily., Disp: 30 tablet, Rfl: 11  •  desvenlafaxine (PRISTIQ) 100 MG 24 hr tablet, Take 1 tablet by mouth Daily., Disp: 30 tablet, Rfl: 11  •  glucose blood " (FREESTYLE TEST STRIPS) test strip, USE TO TEST BLOOD SUGAR 6 TIMES DAILY, Disp: 600 each, Rfl: 3  •  glucose blood test strip, USE TO TEST BLOOD SUGAR 6 TIMES DAILY  ONE TOUCH ULTRA TEST STRIPS, Disp: 600 each, Rfl: 3  •  insulin aspart (novoLOG FLEXPEN) 100 UNIT/ML solution pen-injector sc pen, Novolog 1 units for 7 gm for BF, 1 unit for 6 gm for lunch, 1 unit for 8 gm for dinner.max daily 50 units, Disp: 15 pen, Rfl: 3  •  insulin degludec (TRESIBA FLEXTOUCH) 100 UNIT/ML solution pen-injector injection, Inject 24 Units under the skin into the appropriate area as directed Every Night., Disp: 10 pen, Rfl: 3  •  losartan-hydrochlorothiazide (HYZAAR) 100-12.5 MG per tablet, Take 1 tablet by mouth Daily., Disp: 90 tablet, Rfl: 3  •  MILK THISTLE PO, Take  by mouth Daily., Disp: , Rfl:   •  triazolam (HALCION) 0.25 MG tablet, Take 1 tablet by mouth At Night As Needed for Sleep., Disp: 30 tablet, Rfl: 3  •  TURMERIC PO, Take  by mouth., Disp: , Rfl:     The following portions of the patient's history were reviewed and updated as appropriate: allergies, current medications, past family history, past medical history, past social history, past surgical history and problem list.    Review of Systems   Constitutional: Negative for chills and fever.   Eyes: Negative for pain and redness.   Respiratory: Negative for cough and shortness of breath.    Cardiovascular: Negative for chest pain and leg swelling.   Musculoskeletal: Positive for neck pain.   Neurological: Negative for dizziness and headaches.       Objective   Physical Exam   Constitutional: She is oriented to person, place, and time. She appears well-developed and well-nourished.   HENT:   Head: Normocephalic and atraumatic.   Right Ear: Tympanic membrane, external ear and ear canal normal.   Left Ear: Tympanic membrane, external ear and ear canal normal.   Nose: Nose normal. Right sinus exhibits no maxillary sinus tenderness and no frontal sinus tenderness. Left  sinus exhibits no maxillary sinus tenderness and no frontal sinus tenderness.   Mouth/Throat: Uvula is midline, oropharynx is clear and moist and mucous membranes are normal.   Eyes: Pupils are equal, round, and reactive to light. Conjunctivae and EOM are normal. Right eye exhibits no discharge. Left eye exhibits no discharge. No scleral icterus.   Neck: Neck supple. No JVD present.   Cardiovascular: Normal rate, regular rhythm and normal heart sounds.  Exam reveals no gallop and no friction rub.    No murmur heard.  Pulmonary/Chest: Effort normal and breath sounds normal. She has no wheezes. She has no rales.   Musculoskeletal: She exhibits no edema.   Lymphadenopathy:     She has no cervical adenopathy.   Neurological: She is alert and oriented to person, place, and time. No cranial nerve deficit.   Skin: Skin is warm and dry. No rash noted.   Psychiatric: She has a normal mood and affect. Her behavior is normal.   Vitals reviewed.      Assessment/Plan   Ivon was seen today for insomnia, neck pain and anxiety.    Diagnoses and all orders for this visit:    Cervicalgia    Psychophysiological insomnia    Anxiety    1. Cervicalgia due to C-spine DDD - doing much better. Continue PT. Doing well.  2. Insomnia - still restless at nights, but doing much better. Will continue Triasolam.  3. Anxiety d/o - doing much better with Pristiq, able to function. Much better mood. Will continue treatment.

## 2018-11-09 ENCOUNTER — TELEPHONE (OUTPATIENT)
Dept: INTERNAL MEDICINE | Facility: CLINIC | Age: 53
End: 2018-11-09

## 2018-11-09 NOTE — TELEPHONE ENCOUNTER
----- Message from Marlen Sousa MD sent at 11/9/2018  9:04 AM EST -----  Please call patient: excellent cholesterol, diabetes control had improved - good job! Normal liver and kidney tests.

## 2018-11-28 ENCOUNTER — TELEPHONE (OUTPATIENT)
Dept: INTERNAL MEDICINE | Facility: CLINIC | Age: 53
End: 2018-11-28

## 2018-11-28 NOTE — ADDENDUM NOTE
"Writer received return phone call from pt's mother, Reinaldo Hua ([phone]). Reinaldo Hua explained that pt did start her ""major drug use\"" until after she moved out of the house in August, before then she was smoking marijuana. She shared that pt was in [facility] for detox and then went to sober living. Pt spent about 3 weeks there and the \""ran away with some boy. \""  Reinaldo Hua expressed concern for her daughter but also confusion on how to help her. She shared that she pt is always welcome to stay there but that pt does not want to do so. She shared that she and her  are both very loving and have very few expectations of her. Reinaldo Hua also shared that she has \""lost every job she has ever had. \""  At one point Reinaldo Hua was dispensing her medications to her weekly but unsure of how consistent she was with taking these. When asked about any eating disorder behaviors, Reinaldo Hua shared that she has been purging \""on and off\"" for a couple of years. Pt has been to dentist to \""fix her teeth\"" but they have not done anything as pt continues to purge.    " Addended by: TIM KHAN on: 7/31/2018 05:59 PM     Modules accepted: Orders

## 2018-11-28 NOTE — TELEPHONE ENCOUNTER
----- Message from Kristy Rawls sent at 11/28/2018 11:58 AM EST -----  Contact: Patient  Patient calling would like a copy of labs mailed to her. Please advise    Patient:[phone]

## 2018-11-30 ENCOUNTER — OFFICE VISIT (OUTPATIENT)
Dept: NEUROSURGERY | Facility: CLINIC | Age: 53
End: 2018-11-30

## 2018-11-30 ENCOUNTER — TELEPHONE (OUTPATIENT)
Dept: INTERNAL MEDICINE | Facility: CLINIC | Age: 53
End: 2018-11-30

## 2018-11-30 VITALS
HEIGHT: 65 IN | HEART RATE: 84 BPM | BODY MASS INDEX: 24.66 KG/M2 | DIASTOLIC BLOOD PRESSURE: 60 MMHG | WEIGHT: 148 LBS | RESPIRATION RATE: 16 BRPM | SYSTOLIC BLOOD PRESSURE: 112 MMHG

## 2018-11-30 DIAGNOSIS — M50.30 DEGENERATIVE DISC DISEASE, CERVICAL: Primary | ICD-10-CM

## 2018-11-30 PROCEDURE — 99213 OFFICE O/P EST LOW 20 MIN: CPT | Performed by: NURSE PRACTITIONER

## 2018-11-30 NOTE — TELEPHONE ENCOUNTER
----- Message from Kristy Rawls sent at 11/30/2018 10:22 AM EST -----  Contact: Patient  Patient calling needs a PA for triazolam (HALCION) 0.25 MG tablet. Please advise    Patient:[phone]    Ph[facility] [address] [place] [address] [facility] - [phone]  - [phone] FX

## 2019-01-04 ENCOUNTER — TELEPHONE (OUTPATIENT)
Dept: INTERNAL MEDICINE | Facility: CLINIC | Age: 54
End: 2019-01-04

## 2019-01-04 ENCOUNTER — OFFICE VISIT (OUTPATIENT)
Dept: ENDOCRINOLOGY | Age: 54
End: 2019-01-04

## 2019-01-04 VITALS
DIASTOLIC BLOOD PRESSURE: 78 MMHG | BODY MASS INDEX: 24.66 KG/M2 | HEIGHT: 65 IN | WEIGHT: 148 LBS | OXYGEN SATURATION: 98 % | HEART RATE: 78 BPM | SYSTOLIC BLOOD PRESSURE: 132 MMHG

## 2019-01-04 DIAGNOSIS — E10.43 TYPE 1 DIABETES MELLITUS WITH DIABETIC AUTONOMIC NEUROPATHY (HCC): Primary | ICD-10-CM

## 2019-01-04 DIAGNOSIS — E78.2 MIXED HYPERLIPIDEMIA: ICD-10-CM

## 2019-01-04 DIAGNOSIS — F51.04 PSYCHOPHYSIOLOGICAL INSOMNIA: Primary | ICD-10-CM

## 2019-01-04 PROCEDURE — 99214 OFFICE O/P EST MOD 30 MIN: CPT | Performed by: INTERNAL MEDICINE

## 2019-01-04 RX ORDER — ATORVASTATIN CALCIUM 20 MG/1
20 TABLET, FILM COATED ORAL DAILY
Qty: 30 TABLET | Refills: 11 | Status: SHIPPED | OUTPATIENT
Start: 2019-01-04 | End: 2020-01-16

## 2019-01-04 NOTE — TELEPHONE ENCOUNTER
----- Message from Milka Byrne sent at 1/4/2019 12:45 PM EST -----  Regarding: Refill request  Contact: [phone]  Patient wanted to let Dr. Sousa know that she just filled her last prescription on HALCION. Patient would like a refill sent to [facility]. She said that it is helping. Please call with any questions. [phone].  Thanks!

## 2019-01-04 NOTE — PROGRESS NOTES
53 y.o.    Patient Care Team:  Marlen Sousa MD as PCP - General (Internal Medicine)  Shyam Zuniga MD as Consulting Physician (Endocrinology)    Chief Complaint:    FOLLOW UP APPOINTMENT/ TYPE 2 DIABETES MELLITUS  Subjective     HPI    Ivon CORRALES Jordan,53 y.o. WF is here as follow up for the management of type 1 dm.      Type 1 dm - Diagnosed about 11 years ago. Insulin was started about 10 years ago.   Pt stopped using the insulin pump as her BG have been runing high and low and the sensor was constantly going off with alerts. Pt stopped using the sensor at around the same time. She works at [employer] and feels that the metal detector has been causing issues with her pump/sensor.   Today in clinic pt reports she is on tresiba 22 - 24 units - based on her BG in the morning. Takes it in the morning.   Novolog 1 units for 6 gm of carbs for BF/Lunch and dinner. 1 units for 50 above 140 mg/dl.   Checks BG 5 - 6 times a day.   She did get her glucometer for me to review. Avg BG - 150 - 160 mg/dl  Lowest blood sugar in the last 2 weeks was around 54 mg/dL range, she does have hypoglycemic awareness.  Highest blood glucose was at around 300 mg/dL range.  Last eye examination was about 4 months ago, no history of diabetic retinopathy.  No complaints of tingling and numbness in her bilateral feet.  No ulcers or amputations of her toes.  She is physically active and her weight stable.   She does report that she is comfortable counting carbohydrates.   On ARB.  Last DKA was in Aug 2017 and that was her first episode of DKA.      HLP   On liptor 20 mg po daily.      Chronic back pain - seeing .     Reviewed primary care physician's/consulting physician documentation and lab results :     Interval History      The following portions of the patient's history were reviewed and updated as appropriate: allergies, current medications, past family history, past medical history, past social history, past surgical history  and problem list.    Past Medical History:   Diagnosis Date   • Anxiety    • Arthralgia of hip 2/10/2016   • Arthritis    • Diabetes mellitus (CMS/HCC)    • Esophageal candidiasis (CMS/HCC)    • Hyperlipidemia    • Hypertension    • Insomnia    • Pregnancy        • Thyroid disease      Family History   Adopted: Yes     Social History     Socioeconomic History   • Marital status:      Spouse name: Not on file   • Number of children: Not on file   • Years of education: Not on file   • Highest education level: Not on file   Social Needs   • Financial resource strain: Not on file   • Food insecurity - worry: Not on file   • Food insecurity - inability: Not on file   • Transportation needs - medical: Not on file   • Transportation needs - non-medical: Not on file   Occupational History   • Occupation:      Comment: full time   Tobacco Use   • Smoking status: Former Smoker     Packs/day: 1.00     Years: 5.00     Pack years: 5.00     Types: Cigarettes     Last attempt to quit:      Years since quittin.0   • Smokeless tobacco: Never Used   Substance and Sexual Activity   • Alcohol use: Yes   • Drug use: No   • Sexual activity: Defer   Other Topics Concern   • Not on file   Social History Narrative   • Not on file     Allergies   Allergen Reactions   • Ampicillin Diarrhea       Current Outpatient Medications:   •  atorvastatin (LIPITOR) 20 MG tablet, Take 1 tablet by mouth Daily., Disp: 30 tablet, Rfl: 11  •  desvenlafaxine (PRISTIQ) 100 MG 24 hr tablet, Take 1 tablet by mouth Daily., Disp: 30 tablet, Rfl: 11  •  glucose blood test strip, USE TO TEST BLOOD SUGAR 6 TIMES DAILY  ONE TOUCH ULTRA TEST STRIPS, Disp: 600 each, Rfl: 3  •  insulin aspart (novoLOG FLEXPEN) 100 UNIT/ML solution pen-injector sc pen, Novolog 1 units for 7 gm for BF, 1 unit for 6 gm for lunch, 1 unit for 8 gm for dinner.max daily 50 units, Disp: 15 pen, Rfl: 3  •  insulin degludec (TRESIBA FLEXTOUCH)  "100 UNIT/ML solution pen-injector injection, Inject 24 Units under the skin into the appropriate area as directed Every Night., Disp: 10 pen, Rfl: 3  •  losartan-hydrochlorothiazide (HYZAAR) 100-12.5 MG per tablet, Take 1 tablet by mouth Daily., Disp: 90 tablet, Rfl: 3  •  MILK THISTLE PO, Take  by mouth Daily., Disp: , Rfl:   •  TURMERIC PO, Take  by mouth., Disp: , Rfl:   •  aspirin 81 MG tablet, Take 81 mg by mouth., Disp: , Rfl:   •  triazolam (HALCION) 0.25 MG tablet, Take 1 tablet by mouth At Night As Needed for Sleep., Disp: 30 tablet, Rfl: 3        Review of Systems   Constitutional: Positive for fatigue. Negative for appetite change and fever.   Eyes: Negative for visual disturbance.   Respiratory: Negative for shortness of breath.    Cardiovascular: Negative for palpitations and leg swelling.   Gastrointestinal: Negative for abdominal pain and vomiting.   Endocrine: Negative for polydipsia and polyuria.   Musculoskeletal: Positive for neck pain. Negative for joint swelling.   Skin: Negative for rash.   Neurological: Negative for weakness and numbness.   Psychiatric/Behavioral: Negative for behavioral problems.       Objective       Vitals:    01/04/19 1147   BP: 132/78   Pulse: 78   SpO2: 98%   Weight: 67.1 kg (148 lb)   Height: 165.1 cm (65\")     Body mass index is 24.63 kg/m².      Physical Exam   Constitutional: She is oriented to person, place, and time. She appears well-developed and well-nourished.   HENT:   Head: Normocephalic.   Eyes: Conjunctivae and EOM are normal. No scleral icterus.   Neck: Normal range of motion. Neck supple. No thyromegaly present.   Cardiovascular: Normal rate and normal heart sounds.   Pulmonary/Chest: Effort normal and breath sounds normal. No stridor. She has no wheezes. She has no rales.   Abdominal: Soft. Bowel sounds are normal. She exhibits no distension. There is no tenderness.   Musculoskeletal: She exhibits no edema or deformity.    Ivon had a diabetic foot " exam performed today.   During the foot exam she had a monofilament test performed.    Neurological Sensory Findings -  Altered sharp/dull right ankle/foot discrimination and altered sharp/dull left ankle/foot discrimination. No right ankle/foot altered proprioception and no left ankle/foot altered proprioception  Vascular Status -  Her right foot exhibits abnormal foot edema. Her left foot exhibits abnormal foot edema.  Skin Integrity  -  Her right foot skin is intact.Her left foot skin is intact..   Foot Structure and Biomechanics -  Her right foot exhibits hammer toes.  Her left foot exhibits hammer toes.  Lymphadenopathy:     She has no cervical adenopathy.   Neurological: She is alert and oriented to person, place, and time.   Skin: Skin is warm and dry. She is not diaphoretic.   Psychiatric: She has a normal mood and affect.   Vitals reviewed.    Results Review:     I reviewed the patient's new clinical results and mentioned them above in HPI and in plan as well.    Medical records reviewed  Summary: done      Office Visit on 11/08/2018   Component Date Value Ref Range Status   • Glucose 11/08/2018 200* 65 - 99 mg/dL Final   • BUN 11/08/2018 12  6 - 20 mg/dL Final   • Creatinine 11/08/2018 0.75  0.57 - 1.00 mg/dL Final   • Sodium 11/08/2018 139  136 - 145 mmol/L Final   • Potassium 11/08/2018 3.6  3.5 - 5.2 mmol/L Final   • Chloride 11/08/2018 98  98 - 107 mmol/L Final   • CO2 11/08/2018 28.2  22.0 - 29.0 mmol/L Final   • Calcium 11/08/2018 10.0  8.6 - 10.5 mg/dL Final   • Total Protein 11/08/2018 7.7  6.0 - 8.5 g/dL Final   • Albumin 11/08/2018 4.80  3.50 - 5.20 g/dL Final   • ALT (SGPT) 11/08/2018 16  1 - 33 U/L Final   • AST (SGOT) 11/08/2018 24  1 - 32 U/L Final   • Alkaline Phosphatase 11/08/2018 119* 39 - 117 U/L Final   • Total Bilirubin 11/08/2018 0.4  0.1 - 1.2 mg/dL Final   • eGFR Non  Amer 11/08/2018 80  >60 mL/min/1.73 Final   • Globulin 11/08/2018 2.9  gm/dL Final   • A/G Ratio 11/08/2018  1.7  g/dL Final   • BUN/Creatinine Ratio 11/08/2018 16.0  7.0 - 25.0 Final   • Anion Gap 11/08/2018 12.8  mmol/L Final   • Total Cholesterol 11/08/2018 175  0 - 200 mg/dL Final   • Triglycerides 11/08/2018 52  0 - 150 mg/dL Final   • HDL Cholesterol 11/08/2018 90* 40 - 60 mg/dL Final   • LDL Cholesterol  11/08/2018 75  0 - 100 mg/dL Final   • VLDL Cholesterol 11/08/2018 10.4  5 - 40 mg/dL Final   • LDL/HDL Ratio 11/08/2018 0.83   Final   • Hemoglobin A1C 11/08/2018 6.62* 4.80 - 5.60 % Final    Hemoglobin A1C Ranges:  Increased Risk for Diabetes  5.7% to 6.4%  Diabetes                     >= 6.5%  Diabetic Goal                < 7.0%     Lab Results   Component Value Date    HGBA1C 6.62 (H) 11/08/2018    HGBA1C 7.9 (H) 05/23/2018    HGBA1C 8.10 (H) 12/14/2017     Lab Results   Component Value Date    MICROALBUR <3.0 05/23/2018    CREATININE 0.75 11/08/2018     Imaging Results (most recent)     None                Assessment and Plan:    Diagnoses and all orders for this visit:    Type 1 diabetes mellitus with diabetic autonomic neuropathy (CMS/HCC)  -     Hemoglobin A1c; Future  -     Basic Metabolic Panel; Future  -     Lipid Panel; Future  -     TSH; Future  -     Vitamin B12 & Folate; Future  -     Vitamin D 25 Hydroxy; Future  -     Ambulatory Referral to Diabetic Education    Mixed hyperlipidemia    Other orders  -     atorvastatin (LIPITOR) 20 MG tablet; Take 1 tablet by mouth Daily.        Type 1 diabetes mellitus-uncontrolled with severely variable blood sugars  Counseled the patient again that she would be benefiting with an insulin pump and sensor.  Variability of her blood sugars she is always at risk for either severely high or low blood sugars which is the nature of type 1 diabetes mellitus itself.  Continue tresiba at 22-24 units in the morning.  Will change NovoLog 1 unit for 5 g of carbohydrates with breakfast, 1 unit for 6 g of carbohydrates with lunch and 1 unit for 5 g of carbohydrates with  "dinner  Continue the current sliding scale.    Hyperlipidemia  Continue Lipitor 20 mg oral daily.    Will contact the diabetic educator to discuss with the patient about omnipod  insulin pump.  Also contacted dexcom agents to restart the sensor on the patient.    Reviewed Lab results with the patient.             Shyam Zuniga MD  01/04/19    EMR Dragon / transcription disclaimer:     \"Dictated utilizing Dragon dictation\".  "

## 2019-01-24 DIAGNOSIS — F51.04 PSYCHOPHYSIOLOGICAL INSOMNIA: ICD-10-CM

## 2019-04-04 ENCOUNTER — RESULTS ENCOUNTER (OUTPATIENT)
Dept: ENDOCRINOLOGY | Age: 54
End: 2019-04-04

## 2019-04-04 DIAGNOSIS — E10.43 TYPE 1 DIABETES MELLITUS WITH DIABETIC AUTONOMIC NEUROPATHY (HCC): ICD-10-CM

## 2019-04-23 LAB
25(OH)D3+25(OH)D2 SERPL-MCNC: 32 NG/ML (ref 30–100)
BUN SERPL-MCNC: 11 MG/DL (ref 6–20)
BUN/CREAT SERPL: 15.1 (ref 7–25)
CALCIUM SERPL-MCNC: 9.8 MG/DL (ref 8.6–10.5)
CHLORIDE SERPL-SCNC: 97 MMOL/L (ref 98–107)
CHOLEST SERPL-MCNC: 167 MG/DL (ref 0–200)
CO2 SERPL-SCNC: 26.7 MMOL/L (ref 22–29)
CREAT SERPL-MCNC: 0.73 MG/DL (ref 0.57–1)
FOLATE SERPL-MCNC: 8.41 NG/ML (ref 4.78–24.2)
GLUCOSE SERPL-MCNC: 190 MG/DL (ref 65–99)
HBA1C MFR BLD: 7.5 % (ref 4.8–5.6)
HDLC SERPL-MCNC: 69 MG/DL (ref 40–60)
INTERPRETATION: NORMAL
LDLC SERPL CALC-MCNC: 81 MG/DL (ref 0–100)
Lab: NORMAL
POTASSIUM SERPL-SCNC: 4.6 MMOL/L (ref 3.5–5.2)
SODIUM SERPL-SCNC: 137 MMOL/L (ref 136–145)
TRIGL SERPL-MCNC: 83 MG/DL (ref 0–150)
TSH SERPL DL<=0.005 MIU/L-ACNC: 1.2 MIU/ML (ref 0.27–4.2)
VIT B12 SERPL-MCNC: 409 PG/ML (ref 211–946)
VLDLC SERPL CALC-MCNC: 16.6 MG/DL

## 2019-04-26 DIAGNOSIS — F51.04 PSYCHOPHYSIOLOGICAL INSOMNIA: ICD-10-CM

## 2019-05-06 ENCOUNTER — OFFICE VISIT (OUTPATIENT)
Dept: INTERNAL MEDICINE | Facility: CLINIC | Age: 54
End: 2019-05-06

## 2019-05-06 ENCOUNTER — OFFICE VISIT (OUTPATIENT)
Dept: ENDOCRINOLOGY | Age: 54
End: 2019-05-06

## 2019-05-06 VITALS
HEIGHT: 65 IN | DIASTOLIC BLOOD PRESSURE: 70 MMHG | HEART RATE: 82 BPM | BODY MASS INDEX: 24.99 KG/M2 | OXYGEN SATURATION: 99 % | SYSTOLIC BLOOD PRESSURE: 120 MMHG | WEIGHT: 150 LBS

## 2019-05-06 VITALS
WEIGHT: 149 LBS | HEART RATE: 75 BPM | SYSTOLIC BLOOD PRESSURE: 138 MMHG | HEIGHT: 65 IN | OXYGEN SATURATION: 98 % | BODY MASS INDEX: 24.83 KG/M2 | DIASTOLIC BLOOD PRESSURE: 70 MMHG

## 2019-05-06 DIAGNOSIS — I10 ESSENTIAL HYPERTENSION: Primary | ICD-10-CM

## 2019-05-06 DIAGNOSIS — E10.43 TYPE 1 DIABETES MELLITUS WITH DIABETIC AUTONOMIC NEUROPATHY (HCC): Primary | ICD-10-CM

## 2019-05-06 DIAGNOSIS — F51.04 PSYCHOPHYSIOLOGICAL INSOMNIA: ICD-10-CM

## 2019-05-06 DIAGNOSIS — F41.9 ANXIETY: ICD-10-CM

## 2019-05-06 DIAGNOSIS — Z00.00 HEALTH CARE MAINTENANCE: ICD-10-CM

## 2019-05-06 DIAGNOSIS — E10.65 TYPE 1 DIABETES MELLITUS WITH HYPERGLYCEMIA (HCC): ICD-10-CM

## 2019-05-06 DIAGNOSIS — E78.2 MIXED HYPERLIPIDEMIA: ICD-10-CM

## 2019-05-06 PROCEDURE — 99214 OFFICE O/P EST MOD 30 MIN: CPT | Performed by: INTERNAL MEDICINE

## 2019-05-06 NOTE — PROGRESS NOTES
54 y.o.    Patient Care Team:  Mareln Sousa MD as PCP - General (Internal Medicine)  Shyam Zuniga MD as Consulting Physician (Endocrinology)    Chief Complaint:    FOLLOW UP/ TYPE 1 DIABETES MELLITUS  Subjective     HPI      Ivon SAVANNA Ortega,54 y.o. WF is here as follow up for the management of type 1 dm.      Type 1 dm - Diagnosed about 11 years ago. Insulin was started about 10 years ago.   Pt stopped using the insulin pump as her BG have been runing high and low and the sensor was constantly going off with alerts. Pt stopped using the sensor at around the same time. She works at [employer] and feels that the metal detector has been causing issues with her pump/sensor.   Patient has stopped using the insulin pump 6 months ago.  Currently she is on basal bolus insulin regimen.  Today in the clinic patient reports that she is on Tresiba 22-24 units in the morning.  NovoLog 1 unit for 5-6 g of carbohydrates for breakfast/lunch and dinner, 1 unit for 50 above 140 mg/dL correction factor.  She checks her blood sugars at least 5-7 times a day.  Reviewed her glucometer average blood sugars are around 157 mg/dL.  She does have lowest blood sugars at around afternoon time most of the blood sugars are running between 50-59, she does have hypoglycemic awareness.  She is currently working from 4 AM-12:30 PM and at which time she might not be having the proper breakfast or lunch that is contributing to her low blood sugar episodes.  She also reports that she is still not comfortable and counting the carbohydrates.  Highest blood sugar was around 295.  Last eye examination was within the last 1 year, no history of diabetic retinopathy.  No complaints of tingling and numbness in her bilateral feet.  No ulcers or amputations of her toes.  She is physically active and her weight stable.   She does report that she is comfortable counting carbohydrates.   On ARB.  Last DKA was in Aug 2017 and that was her first episode of  DKA.      HLP   On liptor 20 mg po daily.      Chronic back pain - seeing .         Reviewed primary care physician's/consulting physician documentation and lab results :     Interval History      The following portions of the patient's history were reviewed and updated as appropriate: allergies, current medications, past family history, past medical history, past social history, past surgical history and problem list.    Past Medical History:   Diagnosis Date   • Anxiety    • Arthralgia of hip 2/10/2016   • Arthritis    • Diabetes mellitus (CMS/HCC)    • Esophageal candidiasis (CMS/HCC)    • Hyperlipidemia    • Hypertension    • Insomnia    • Pregnancy        • Thyroid disease      Family History   Adopted: Yes     Social History     Socioeconomic History   • Marital status:      Spouse name: Not on file   • Number of children: Not on file   • Years of education: Not on file   • Highest education level: Not on file   Occupational History   • Occupation:      Comment: full time   Tobacco Use   • Smoking status: Former Smoker     Packs/day: 1.00     Years: 5.00     Pack years: 5.00     Types: Cigarettes     Last attempt to quit:      Years since quittin.3   • Smokeless tobacco: Never Used   Substance and Sexual Activity   • Alcohol use: Yes   • Drug use: No   • Sexual activity: Defer     Allergies   Allergen Reactions   • Ampicillin Diarrhea       Current Outpatient Medications:   •  aspirin 81 MG tablet, Take 81 mg by mouth., Disp: , Rfl:   •  atorvastatin (LIPITOR) 20 MG tablet, Take 1 tablet by mouth Daily., Disp: 30 tablet, Rfl: 11  •  desvenlafaxine (PRISTIQ) 100 MG 24 hr tablet, Take 1 tablet by mouth Daily., Disp: 30 tablet, Rfl: 11  •  glucose blood test strip, USE TO TEST BLOOD SUGAR 6 TIMES DAILY  ONE TOUCH ULTRA TEST STRIPS, Disp: 600 each, Rfl: 3  •  insulin aspart (novoLOG FLEXPEN) 100 UNIT/ML solution pen-injector sc pen, Novolog 1 units for 7  "gm for BF, 1 unit for 6 gm for lunch, 1 unit for 8 gm for dinner.max daily 50 units, Disp: 15 pen, Rfl: 3  •  insulin degludec (TRESIBA FLEXTOUCH) 100 UNIT/ML solution pen-injector injection, Inject 24 Units under the skin into the appropriate area as directed Every Night., Disp: 10 pen, Rfl: 3  •  losartan-hydrochlorothiazide (HYZAAR) 100-12.5 MG per tablet, Take 1 tablet by mouth Daily., Disp: 90 tablet, Rfl: 3  •  MILK THISTLE PO, Take  by mouth Daily., Disp: , Rfl:   •  triazolam (HALCION) 0.25 MG tablet, Take 1.5 tablets by mouth At Night As Needed for Sleep., Disp: 45 tablet, Rfl: 3  •  TURMERIC PO, Take  by mouth., Disp: , Rfl:         Review of Systems   Constitutional: Positive for appetite change and fatigue. Negative for fever.   Eyes: Negative for visual disturbance.   Respiratory: Negative for shortness of breath.    Cardiovascular: Negative for palpitations and leg swelling.   Gastrointestinal: Negative for abdominal pain and vomiting.   Endocrine: Negative for polydipsia and polyuria.   Musculoskeletal: Negative for joint swelling and neck pain.   Skin: Negative for rash.   Neurological: Negative for weakness and numbness.   Psychiatric/Behavioral: Negative for behavioral problems.       Objective       Vitals:    05/06/19 1308   BP: 120/70   Pulse: 82   SpO2: 99%   Weight: 68 kg (150 lb)   Height: 165.1 cm (65\")     Body mass index is 24.96 kg/m².      Physical Exam   Constitutional: She is oriented to person, place, and time. She appears well-nourished.   HENT:   Head: Normocephalic and atraumatic.   Eyes: Conjunctivae and EOM are normal. No scleral icterus.   Neck: Normal range of motion. Neck supple. No thyromegaly present.   Cardiovascular: Normal rate and normal heart sounds. Exam reveals no friction rub.   No murmur heard.  Pulmonary/Chest: Effort normal and breath sounds normal. No stridor. She has no wheezes. She has no rales.   Abdominal: Soft. Bowel sounds are normal. She exhibits no " distension. There is no tenderness.   Musculoskeletal: She exhibits no edema or tenderness.   Lymphadenopathy:     She has no cervical adenopathy.   Neurological: She is alert and oriented to person, place, and time.   Skin: Skin is warm and dry. She is not diaphoretic.   Psychiatric: She has a normal mood and affect.   Vitals reviewed.    Results Review:     I reviewed the patient's new clinical results and mentioned them above in HPI and in plan as well.    Medical records reviewed  Summary:done      Results Encounter on 04/04/2019   Component Date Value Ref Range Status   • Hemoglobin A1C 04/22/2019 7.50* 4.80 - 5.60 % Final    Comment: Hemoglobin A1C Ranges:  Increased Risk for Diabetes  5.7% to 6.4%  Diabetes                     >= 6.5%  Diabetic Goal                < 7.0%     • Glucose 04/22/2019 190* 65 - 99 mg/dL Final   • BUN 04/22/2019 11  6 - 20 mg/dL Final   • Creatinine 04/22/2019 0.73  0.57 - 1.00 mg/dL Final   • eGFR Non African Am 04/22/2019 82  >60 mL/min/1.73 Final   • eGFR African Am 04/22/2019 100  >60 mL/min/1.73 Final   • BUN/Creatinine Ratio 04/22/2019 15.1  7.0 - 25.0 Final   • Sodium 04/22/2019 137  136 - 145 mmol/L Final   • Potassium 04/22/2019 4.6  3.5 - 5.2 mmol/L Final   • Chloride 04/22/2019 97* 98 - 107 mmol/L Final   • Total CO2 04/22/2019 26.7  22.0 - 29.0 mmol/L Final   • Calcium 04/22/2019 9.8  8.6 - 10.5 mg/dL Final   • Total Cholesterol 04/22/2019 167  0 - 200 mg/dL Final   • Triglycerides 04/22/2019 83  0 - 150 mg/dL Final   • HDL Cholesterol 04/22/2019 69* 40 - 60 mg/dL Final   • VLDL Cholesterol 04/22/2019 16.6  mg/dL Final   • LDL Cholesterol  04/22/2019 81  0 - 100 mg/dL Final   • TSH 04/22/2019 1.200  0.270 - 4.200 mIU/mL Final   • Vitamin B-12 04/22/2019 409  211 - 946 pg/mL Final   • Folate 04/22/2019 8.41  4.78 - 24.20 ng/mL Final   • 25 Hydroxy, Vitamin D 04/22/2019 32.0  30.0 - 100.0 ng/ml Final    Comment: Reference Range for Total Vitamin D 25(OH)  Deficiency <20.0  "ng/mL  Insufficiency 21-29 ng/mL  Sufficiency  ng/mL  Toxicity >100 ng/ml     • Interpretation 04/22/2019 Note   Final    Supplemental report is available.   • PDF Image 04/22/2019 Not applicable   Final     Lab Results   Component Value Date    HGBA1C 7.50 (H) 04/22/2019    HGBA1C 6.62 (H) 11/08/2018    HGBA1C 7.9 (H) 05/23/2018     Lab Results   Component Value Date    MICROALBUR <3.0 05/23/2018    CREATININE 0.73 04/22/2019     Imaging Results (most recent)     None                        Assessment and Plan:    Ivon was seen today for diabetes.    Diagnoses and all orders for this visit:    Type 1 diabetes mellitus with diabetic autonomic neuropathy (CMS/HCC)  -     Basic Metabolic Panel; Future    Mixed hyperlipidemia  -     Basic Metabolic Panel; Future    Type 1 diabetes mellitus with hyperglycemia (CMS/HCC)  -     Basic Metabolic Panel; Future      Type 1 diabetes mellitus-complicated with hypoglycemic episodes  Discussed with the patient benefits of Dexcom  Discussed with the patient benefits of 670 G-along with the sensor  Explained to the patient given her type of diabetes variable blood sugars are expected in insert scenarios a continuous glucose monitoring would be a good option which would alert her with the predictive lows.  Patient would like to think about it.  Increase the Tresiba dosage to 24-26 units in the morning  Decrease the dosage of NovoLog to 1 unit for 7 g of carbohydrates with breakfast, lunch and 6 g of carbohydrates with dinner  Continue the same correction factor    Hyperlipidemia  Continue Lipitor 20 mg oral daily.    Reviewed Lab results with the patient.             Shyam Zuniga MD  05/06/19    EMR Dragon / transcription disclaimer:     \"Dictated utilizing Dragon dictation\".  "

## 2019-05-06 NOTE — PROGRESS NOTES
"Subjective   Ivon Ortega is a 54 y.o. female.     History of Present Illness     /70   Pulse 75   Ht 165.1 cm (65\")   Wt 67.6 kg (149 lb)   SpO2 98%   BMI 24.79 kg/m²     Current Outpatient Medications:   •  aspirin 81 MG tablet, Take 81 mg by mouth., Disp: , Rfl:   •  atorvastatin (LIPITOR) 20 MG tablet, Take 1 tablet by mouth Daily., Disp: 30 tablet, Rfl: 11  •  desvenlafaxine (PRISTIQ) 100 MG 24 hr tablet, Take 1 tablet by mouth Daily., Disp: 30 tablet, Rfl: 11  •  glucose blood test strip, USE TO TEST BLOOD SUGAR 6 TIMES DAILY  ONE TOUCH ULTRA TEST STRIPS, Disp: 600 each, Rfl: 3  •  insulin aspart (novoLOG FLEXPEN) 100 UNIT/ML solution pen-injector sc pen, Novolog 1 units for 7 gm for BF, 1 unit for 6 gm for lunch, 1 unit for 8 gm for dinner.max daily 50 units, Disp: 15 pen, Rfl: 3  •  insulin degludec (TRESIBA FLEXTOUCH) 100 UNIT/ML solution pen-injector injection, Inject 24 Units under the skin into the appropriate area as directed Every Night., Disp: 10 pen, Rfl: 3  •  losartan-hydrochlorothiazide (HYZAAR) 100-12.5 MG per tablet, Take 1 tablet by mouth Daily., Disp: 90 tablet, Rfl: 3  •  MILK THISTLE PO, Take  by mouth Daily., Disp: , Rfl:   •  triazolam (HALCION) 0.25 MG tablet, TAKE ONE TABLET BY MOUTH EVERY EVENING AS NEEDED FOR SLEEP, Disp: 30 tablet, Rfl: 0  •  TURMERIC PO, Take  by mouth., Disp: , Rfl:     Patient has long-standing benign essential HTN.  BP is usually well controlled with daily use of thiazide diuretic and ARB. On low salt diet with good compliance. Takes medication regularly. Denies chest pain, dyspnea,lightheadedness,  lower extremity edema. Patient does not check blood pressure    Patient has been diagnosed with anxiety d/o. Patient is compliant with treatment: daily use of Pristiq 100 mg a day. Side effects of medication: none. Exercise walking. Stable mood.    Ivon Ortega 54 y.o. female is here for chronic insomnia f/u. Patient had been suffering from insomnia " for years. The major issue had been frequent night time awakening. Contributing factors include anxiety. Patient had tried Triazolam. Patient had found great relief while on medication.She complains of frequent awakening at night, and she also works shifts - goes to bed at 7 pm, and has to get up 2 am.   Patient is compliant with medication.   Ivon Ortega has no history of substance abuse.   The following portions of the patient's history were reviewed and updated as appropriate: allergies, current medications, past family history, past medical history, past social history, past surgical history and problem list.    Review of Systems   Constitutional: Negative for chills and fever.   Eyes: Negative for pain and redness.   Respiratory: Negative for cough and shortness of breath.    Cardiovascular: Negative for chest pain and leg swelling.   Neurological: Negative for dizziness and headaches.       Objective   Physical Exam   Constitutional: She is oriented to person, place, and time. Vital signs are normal. She appears well-developed and well-nourished. No distress.   HENT:   Head: Normocephalic and atraumatic.   Right Ear: External ear normal.   Left Ear: External ear normal.   Nose: Nose normal. No mucosal edema. Right sinus exhibits no maxillary sinus tenderness and no frontal sinus tenderness. Left sinus exhibits no maxillary sinus tenderness and no frontal sinus tenderness.   Mouth/Throat: Oropharynx is clear and moist. No oropharyngeal exudate.   Eyes: Conjunctivae, EOM and lids are normal. Pupils are equal, round, and reactive to light. Right eye exhibits no discharge. Left eye exhibits no discharge. Right conjunctiva is not injected. Left conjunctiva is not injected. No scleral icterus. Right eye exhibits normal extraocular motion. Left eye exhibits normal extraocular motion.   Neck: Normal range of motion and full passive range of motion without pain. Neck supple. No JVD present. Carotid bruit is not  present. No thyromegaly present.   Cardiovascular: Normal rate, regular rhythm, S1 normal, S2 normal, normal heart sounds and intact distal pulses. PMI is not displaced. Exam reveals no gallop and no friction rub.   No murmur heard.  Pulmonary/Chest: Effort normal and breath sounds normal. No accessory muscle usage. No respiratory distress. She has no decreased breath sounds. She has no wheezes. She has no rhonchi. She has no rales.   Abdominal: Soft. Bowel sounds are normal. She exhibits no distension, no pulsatile liver, no fluid wave, no abdominal bruit, no ascites and no mass. There is no tenderness. There is no rebound and no guarding.   Musculoskeletal: She exhibits no edema or deformity.   Lymphadenopathy:        Head (right side): No submental, no submandibular, no preauricular, no posterior auricular and no occipital adenopathy present.        Head (left side): No submental, no submandibular, no tonsillar, no preauricular, no posterior auricular and no occipital adenopathy present.     She has no cervical adenopathy.        Right cervical: No superficial cervical, no deep cervical and no posterior cervical adenopathy present.       Left cervical: No superficial cervical, no deep cervical and no posterior cervical adenopathy present.        Right: No supraclavicular adenopathy present.        Left: No supraclavicular adenopathy present.   Neurological: She is alert and oriented to person, place, and time. She has normal strength and normal reflexes. She displays normal reflexes. No cranial nerve deficit. She exhibits normal muscle tone. Coordination normal.   Reflex Scores:       Bicep reflexes are 2+ on the right side and 2+ on the left side.       Patellar reflexes are 2+ on the right side and 2+ on the left side.  Skin: Skin is warm and dry. No rash noted. She is not diaphoretic. No erythema.   Psychiatric: She has a normal mood and affect. Her speech is normal and behavior is normal. Thought content  normal.   Vitals reviewed.      Assessment/Plan   Diagnoses and all orders for this visit:    Essential hypertension    Anxiety    Psychophysiological insomnia  -     Hemoglobin A1c; Future  -     Microalbumin / Creatinine Urine Ratio - Urine, Clean Catch; Future    Health care maintenance  -     CBC & Differential; Future  -     Comprehensive Metabolic Panel; Future  -     Lipid Panel; Future  -     TSH; Future  -     Urinalysis With Microscopic If Indicated (No Culture) - Urine, Clean Catch; Future    Type 1 diabetes mellitus with hyperglycemia (CMS/HCC)    HTN - well controlled  with current medication. Reminded of the importance of low salt diet. Normal kidney tests and electrolytes. Continue same treatment.  Anxiety - doing better with Pristiq. Needs to start regular exercise.  Insomnia - patient had been suffering from chronic insomnia and and had failed OTC options.She had been taking Triazolam, but still wakes up several times at night. Will, try to increase the dose to 1.5 tab at bedtime and reevaluate in 3 months. Discussed sleep hygiene measures including regular sleep schedule, optimal sleep environment, and relaxing presleep rituals. and Renato reviewed. Will continue Triazolam. Renato report reviewed. [facility] Controlled substance agreement was signed and is in EHR. Patient is compliant with medication and takes it according to the directions. Renato reports are being reviewed at least every 6 months., I will continue to monitor clinical progress with regualar office visits, random pill counts and urine drug screens..  Ivon Ortega will be under my care with regular visits for the reassessment of the treatment.

## 2019-08-02 ENCOUNTER — LAB (OUTPATIENT)
Dept: INTERNAL MEDICINE | Facility: CLINIC | Age: 54
End: 2019-08-02

## 2019-08-02 DIAGNOSIS — F51.04 PSYCHOPHYSIOLOGICAL INSOMNIA: ICD-10-CM

## 2019-08-02 DIAGNOSIS — Z00.00 HEALTH CARE MAINTENANCE: ICD-10-CM

## 2019-08-02 LAB
ALBUMIN SERPL-MCNC: 4.6 G/DL (ref 3.5–5.2)
ALBUMIN UR-MCNC: <1.2 MG/DL
ALBUMIN/GLOB SERPL: 1.5 G/DL
ALP SERPL-CCNC: 110 U/L (ref 39–117)
ALT SERPL W P-5'-P-CCNC: 20 U/L (ref 1–33)
ANION GAP SERPL CALCULATED.3IONS-SCNC: 10.3 MMOL/L (ref 5–15)
AST SERPL-CCNC: 21 U/L (ref 1–32)
BACTERIA UR QL AUTO: ABNORMAL /HPF
BASOPHILS # BLD AUTO: 0.01 10*3/MM3 (ref 0–0.2)
BASOPHILS NFR BLD AUTO: 0.2 % (ref 0–1.5)
BILIRUB SERPL-MCNC: 0.5 MG/DL (ref 0.2–1.2)
BILIRUB UR QL STRIP: NEGATIVE
BUN BLD-MCNC: 9 MG/DL (ref 6–20)
BUN/CREAT SERPL: 11.5 (ref 7–25)
CALCIUM SPEC-SCNC: 9.6 MG/DL (ref 8.6–10.5)
CHLORIDE SERPL-SCNC: 94 MMOL/L (ref 98–107)
CHOLEST SERPL-MCNC: 192 MG/DL (ref 0–200)
CLARITY UR: CLEAR
CO2 SERPL-SCNC: 25.7 MMOL/L (ref 22–29)
COLOR UR: YELLOW
CREAT BLD-MCNC: 0.78 MG/DL (ref 0.57–1)
CREAT UR-MCNC: 87.8 MG/DL
DEPRECATED RDW RBC AUTO: 39.2 FL (ref 37–54)
EOSINOPHIL # BLD AUTO: 0.07 10*3/MM3 (ref 0–0.4)
EOSINOPHIL NFR BLD AUTO: 1.1 % (ref 0.3–6.2)
ERYTHROCYTE [DISTWIDTH] IN BLOOD BY AUTOMATED COUNT: 12.1 % (ref 12.3–15.4)
GFR SERPL CREATININE-BSD FRML MDRD: 76 ML/MIN/1.73
GLOBULIN UR ELPH-MCNC: 3.1 GM/DL
GLUCOSE BLD-MCNC: 144 MG/DL (ref 65–99)
GLUCOSE UR STRIP-MCNC: NEGATIVE MG/DL
HBA1C MFR BLD: 7.36 % (ref 4.8–5.6)
HCT VFR BLD AUTO: 41.1 % (ref 34–46.6)
HDLC SERPL-MCNC: 79 MG/DL (ref 40–60)
HGB BLD-MCNC: 14.2 G/DL (ref 12–15.9)
HGB UR QL STRIP.AUTO: ABNORMAL
HYALINE CASTS UR QL AUTO: ABNORMAL /LPF
KETONES UR QL STRIP: NEGATIVE
LDLC SERPL CALC-MCNC: 92 MG/DL (ref 0–100)
LDLC/HDLC SERPL: 1.16 {RATIO}
LEUKOCYTE ESTERASE UR QL STRIP.AUTO: NEGATIVE
LYMPHOCYTES # BLD AUTO: 2.1 10*3/MM3 (ref 0.7–3.1)
LYMPHOCYTES NFR BLD AUTO: 32.2 % (ref 19.6–45.3)
MCH RBC QN AUTO: 31.6 PG (ref 26.6–33)
MCHC RBC AUTO-ENTMCNC: 34.5 G/DL (ref 31.5–35.7)
MCV RBC AUTO: 91.3 FL (ref 79–97)
MICROALBUMIN/CREAT UR: NORMAL MG/G
MONOCYTES # BLD AUTO: 0.51 10*3/MM3 (ref 0.1–0.9)
MONOCYTES NFR BLD AUTO: 7.8 % (ref 5–12)
MUCOUS THREADS URNS QL MICRO: ABNORMAL /HPF
NEUTROPHILS # BLD AUTO: 3.84 10*3/MM3 (ref 1.7–7)
NEUTROPHILS NFR BLD AUTO: 58.7 % (ref 42.7–76)
NITRITE UR QL STRIP: NEGATIVE
PH UR STRIP.AUTO: 7 [PH] (ref 5–8)
PLATELET # BLD AUTO: 322 10*3/MM3 (ref 140–450)
PMV BLD AUTO: 11.1 FL (ref 6–12)
POTASSIUM BLD-SCNC: 3.8 MMOL/L (ref 3.5–5.2)
PROT SERPL-MCNC: 7.7 G/DL (ref 6–8.5)
PROT UR QL STRIP: NEGATIVE
RBC # BLD AUTO: 4.5 10*6/MM3 (ref 3.77–5.28)
RBC # UR: ABNORMAL /HPF
REF LAB TEST METHOD: ABNORMAL
SODIUM BLD-SCNC: 130 MMOL/L (ref 136–145)
SP GR UR STRIP: 1.01 (ref 1–1.03)
SQUAMOUS #/AREA URNS HPF: ABNORMAL /HPF
TRIGL SERPL-MCNC: 107 MG/DL (ref 0–150)
TSH SERPL DL<=0.05 MIU/L-ACNC: 2.04 MIU/ML (ref 0.27–4.2)
UROBILINOGEN UR QL STRIP: ABNORMAL
VLDLC SERPL-MCNC: 21.4 MG/DL (ref 5–40)
WBC NRBC COR # BLD: 6.53 10*3/MM3 (ref 3.4–10.8)
WBC UR QL AUTO: ABNORMAL /HPF

## 2019-08-02 PROCEDURE — 82570 ASSAY OF URINE CREATININE: CPT | Performed by: INTERNAL MEDICINE

## 2019-08-02 PROCEDURE — 82043 UR ALBUMIN QUANTITATIVE: CPT | Performed by: INTERNAL MEDICINE

## 2019-08-02 PROCEDURE — 85025 COMPLETE CBC W/AUTO DIFF WBC: CPT | Performed by: INTERNAL MEDICINE

## 2019-08-02 PROCEDURE — 84443 ASSAY THYROID STIM HORMONE: CPT | Performed by: INTERNAL MEDICINE

## 2019-08-02 PROCEDURE — 80053 COMPREHEN METABOLIC PANEL: CPT | Performed by: INTERNAL MEDICINE

## 2019-08-02 PROCEDURE — 80061 LIPID PANEL: CPT | Performed by: INTERNAL MEDICINE

## 2019-08-02 PROCEDURE — 36415 COLL VENOUS BLD VENIPUNCTURE: CPT | Performed by: INTERNAL MEDICINE

## 2019-08-02 PROCEDURE — 81001 URINALYSIS AUTO W/SCOPE: CPT | Performed by: INTERNAL MEDICINE

## 2019-08-02 PROCEDURE — 83036 HEMOGLOBIN GLYCOSYLATED A1C: CPT | Performed by: INTERNAL MEDICINE

## 2019-08-04 ENCOUNTER — RESULTS ENCOUNTER (OUTPATIENT)
Dept: ENDOCRINOLOGY | Age: 54
End: 2019-08-04

## 2019-08-04 DIAGNOSIS — E78.2 MIXED HYPERLIPIDEMIA: ICD-10-CM

## 2019-08-04 DIAGNOSIS — E10.65 TYPE 1 DIABETES MELLITUS WITH HYPERGLYCEMIA (HCC): ICD-10-CM

## 2019-08-04 DIAGNOSIS — E10.43 TYPE 1 DIABETES MELLITUS WITH DIABETIC AUTONOMIC NEUROPATHY (HCC): ICD-10-CM

## 2019-08-12 ENCOUNTER — OFFICE VISIT (OUTPATIENT)
Dept: INTERNAL MEDICINE | Facility: CLINIC | Age: 54
End: 2019-08-12

## 2019-08-12 VITALS
BODY MASS INDEX: 23.16 KG/M2 | RESPIRATION RATE: 14 BRPM | WEIGHT: 139 LBS | SYSTOLIC BLOOD PRESSURE: 138 MMHG | HEIGHT: 65 IN | DIASTOLIC BLOOD PRESSURE: 72 MMHG

## 2019-08-12 DIAGNOSIS — Z11.59 NEED FOR HEPATITIS C SCREENING TEST: ICD-10-CM

## 2019-08-12 DIAGNOSIS — F51.04 PSYCHOPHYSIOLOGICAL INSOMNIA: ICD-10-CM

## 2019-08-12 DIAGNOSIS — E78.2 MIXED HYPERLIPIDEMIA: ICD-10-CM

## 2019-08-12 DIAGNOSIS — Z00.00 HEALTH CARE MAINTENANCE: Primary | ICD-10-CM

## 2019-08-12 PROCEDURE — 99396 PREV VISIT EST AGE 40-64: CPT | Performed by: INTERNAL MEDICINE

## 2019-08-12 NOTE — PATIENT INSTRUCTIONS
"  Risk evaluation:  1. Cardiovascular risk factors: hypertension, hyperlipidemia, diabetes mellitus, sedentary life style   2. Diabetes risk factors: patient has diabetes and being treated.  3. Cancer risk factors: diabetes and skin type associated with high risk of skin cancers.  4. Risky behavior: none. Use of seat belts: regular. Use of sunscreens: regular. SPF 50.   Tattoos: one.  H/o blood transfusions/organ transplants before 1992 or clotting factor transfusion before 1987: none.     Prevention:  Cholesterol test  up to date. Cholesterol is mildly elevated. Please continue daily use of Atorvastatin..  Blood sugar test up to date. Fasting blood sugar Patient has diabetes and is under the care of endocrinologist..  Hep C testing (for patients born [id number]): discussed and recommended, will proceed with testing..  Mammogram up to date. Recommended frequency and time of the next screening per GYN.. Breast self exams recommended once a month.  Colonoscopy: will repeat Cologuard in 2021, was normal in 2018..  PAP smear : up to date. Recommendations per GYN..  DEXA : up to date. Recommendations per GYN..                      Insomnia - patient had been suffering from chronic insomnia and and had failed OTC options. Discussed sleep hygiene measures including regular sleep schedule, optimal sleep environment, and relaxing presleep rituals., Renato reviewed., Recommended daily exercise., Advised to avoid caffeinated beverages in the evening. and Needs to limit alcohol use. Will continue Triazolam.  Agreement was signed today.  Ivon Ortega will be under my care with regular visits for the reassessment of the treatment.    HTN - well controlled with current medication regimen. Normal kidney tests and electrolytes. Recommended low salt diet. Continue same medical treatment.  Hyperlipidemia - not well contrlled with statin. Normal liver function tests. LDL target is below < 70 mg/dl (\"very high\" risk for CHD). " "Patient's 89. Continue same treatment. Regular exercise recommended.  DM I - under the care of endocrine, Hb A1C is 7.3. Needs eye exam, already scheduled.  Postmenopausal osteoporosis - just was started on monthly Boniva by her GYN.  Fatty liver disease - Avoid alcohol, as \"fatty liver\" is much more susceptible to the damaging effect of alcohol and other toxins. Increased risk of cirrhosis explained. Also avoid use of over the counter Tylenol or Acetaminophen for pain/fever. Weight loss will be very beneficial. Regular exercise and \"healthy heart\" diet recommended.       "

## 2019-08-12 NOTE — PROGRESS NOTES
"Subjective   Ivon Ortega is a 54 y.o. female.     History of Present Illness   /72 (BP Location: Left arm, Patient Position: Sitting, Cuff Size: Adult)   Resp 14   Ht 165.1 cm (65\")   Wt 63 kg (139 lb)   BMI 23.13 kg/m²   Patient is here for yearly CPE. Last CPE was  1 year ago.  PMH, SH and FH reviewed. Changes in the FH: none .  Patient rates her own health as: good.  Tobacco use: in remote past, as per SH.  Alcohol use:4-5 days a week. 2-5 beers at a time.  Recreational drugs use: none  Medication list rewieved.  Diet: well balanced.  Exercise: none.  Marital status: single.   Employment: full time.Works early shifts starting at 4 am.  Patient rates her stress level as: high.  Dental health: good. Brushes teeth 2 times a day, flosses 1 time a day . Dental visits: 2 times a year .  Vision correction: reading glasses  Hearing: normal.    Recent vaccinations:   Flu  is up to date and recommended yearly  Tdap  is up to date  Shingles prevention : recommended to get Shigrix vaccine at the pharmacy.  Patient is postmenopausal. Past pregnancies: . Currently patient is on no HRT .      Current Outpatient Medications:   •  aspirin 81 MG tablet, Take 81 mg by mouth., Disp: , Rfl:   •  atorvastatin (LIPITOR) 20 MG tablet, Take 1 tablet by mouth Daily., Disp: 30 tablet, Rfl: 11  •  desvenlafaxine (PRISTIQ) 100 MG 24 hr tablet, Take 1 tablet by mouth Daily., Disp: 30 tablet, Rfl: 11  •  glucose blood test strip, USE TO TEST BLOOD SUGAR 6 TIMES DAILY  ONE TOUCH ULTRA TEST STRIPS, Disp: 600 each, Rfl: 3  •  Ibandronate Sodium (BONIVA PO), Take  by mouth., Disp: , Rfl:   •  insulin aspart (novoLOG FLEXPEN) 100 UNIT/ML solution pen-injector sc pen, Novolog 1 units for 7 gm for BF, 1 unit for 6 gm for lunch, 1 unit for 8 gm for dinner.max daily 50 units, Disp: 15 pen, Rfl: 3  •  insulin degludec (TRESIBA FLEXTOUCH) 100 UNIT/ML solution pen-injector injection, Inject 24 Units under the skin into the appropriate " area as directed Every Night., Disp: 10 pen, Rfl: 3  •  losartan-hydrochlorothiazide (HYZAAR) 100-12.5 MG per tablet, Take 1 tablet by mouth Daily., Disp: 90 tablet, Rfl: 3  •  MILK THISTLE PO, Take  by mouth Daily., Disp: , Rfl:   •  triazolam (HALCION) 0.25 MG tablet, Take 1.5 tablets by mouth At Night As Needed for Sleep., Disp: 45 tablet, Rfl: 3  •  TURMERIC PO, Take  by mouth., Disp: , Rfl:     Ivon Ortega 54 y.o. female is here for chronic insomnia f/u. Patient had been suffering from insomnia for years. The major issue had been early morning awakening and frequent night time awakening. Contributing factors include shift work.She goes to bed at 9 pm, but has to be up at 2 am, and has to be at work at 4 am. Patient had tried Triazolam. Patient has found some relief while taking medication.States that she still is not able to stay asleep. She is planing on continuing same shifts in a future.   Patient is compliant with medication.   Ivon Ortega has no history of substance abuse.           The following portions of the patient's history were reviewed and updated as appropriate: allergies, current medications, past family history, past medical history, past social history, past surgical history and problem list.    Review of Systems   Constitutional: Negative for chills and fever.   HENT: Negative for postnasal drip, sinus pressure and sore throat.    Eyes: Negative for pain and itching.   Respiratory: Negative for cough and chest tightness.    Cardiovascular: Negative for chest pain and leg swelling.   Gastrointestinal: Negative for abdominal pain and blood in stool.   Endocrine: Negative for cold intolerance and heat intolerance.   Genitourinary: Negative for difficulty urinating and flank pain.   Musculoskeletal: Positive for back pain and neck pain.   Skin: Negative for color change and rash.   Neurological: Positive for headaches. Negative for dizziness and weakness.   Hematological: Negative for  adenopathy. Does not bruise/bleed easily.   Psychiatric/Behavioral: Positive for sleep disturbance. The patient is nervous/anxious.        Objective   Physical Exam   Constitutional: She is oriented to person, place, and time. She appears well-developed. No distress.   Central weight distribution   HENT:   Head: Normocephalic and atraumatic.   Right Ear: Tympanic membrane, external ear and ear canal normal. No drainage or tenderness. No mastoid tenderness. Tympanic membrane is not injected. No middle ear effusion.   Left Ear: Tympanic membrane, external ear and ear canal normal. No drainage or tenderness. No mastoid tenderness. Tympanic membrane is not injected.  No middle ear effusion.   Nose: Nose normal. No sinus tenderness. Right sinus exhibits no maxillary sinus tenderness and no frontal sinus tenderness. Left sinus exhibits no maxillary sinus tenderness and no frontal sinus tenderness.   Mouth/Throat: Oropharynx is clear and moist. No oropharyngeal exudate.   Eyes: Conjunctivae and EOM are normal. Pupils are equal, round, and reactive to light. Right eye exhibits no discharge. Left eye exhibits no discharge. No scleral icterus.   Neck: Normal range of motion and full passive range of motion without pain. Neck supple. No JVD present. Carotid bruit is not present. No thyromegaly present.   Cardiovascular: Normal rate, regular rhythm, S1 normal, S2 normal, normal heart sounds and intact distal pulses. Exam reveals no gallop and no friction rub.   No murmur heard.  Pulmonary/Chest: Effort normal and breath sounds normal. No respiratory distress. She has no wheezes. She has no rales. She exhibits no tenderness.   Abdominal: Soft. Bowel sounds are normal. She exhibits no distension and no mass. There is no tenderness. There is no rebound and no guarding.   Musculoskeletal: Normal range of motion. She exhibits no edema.    Ivon had a diabetic foot exam performed today.   During the foot exam she had a  monofilament test performed (light touch intact over both feet on exam with microfilament).    Neurological Sensory Findings - Unaltered hot/cold right ankle/foot discrimination and unaltered hot/cold left ankle/foot discrimination. Unaltered sharp/dull right ankle/foot discrimination and unaltered sharp/dull left ankle/foot discrimination. No right ankle/foot altered proprioception and no left ankle/foot altered proprioception  Vascular Status -  Her right foot exhibits normal foot vasculature  and no edema. Her left foot exhibits normal foot vasculature  and no edema.  Skin Integrity  -  Her right foot skin is intact.Her left foot skin is intact..  Lymphadenopathy:        Head (right side): No submental, no submandibular, no preauricular, no posterior auricular and no occipital adenopathy present.        Head (left side): No submental, no submandibular, no tonsillar, no preauricular, no posterior auricular and no occipital adenopathy present.     She has no cervical adenopathy.        Right cervical: No superficial cervical, no deep cervical and no posterior cervical adenopathy present.       Left cervical: No superficial cervical, no deep cervical and no posterior cervical adenopathy present.        Right: No supraclavicular adenopathy present.        Left: No supraclavicular adenopathy present.   Neurological: She is alert and oriented to person, place, and time. She has normal reflexes. She displays normal reflexes. No cranial nerve deficit. She exhibits normal muscle tone. Coordination normal.   Reflex Scores:       Bicep reflexes are 2+ on the right side and 2+ on the left side.       Patellar reflexes are 2+ on the right side and 2+ on the left side.  Skin: Skin is warm. No rash noted. She is not diaphoretic. No erythema.   Fair skin with occasional SKs   Psychiatric: Her behavior is normal. Thought content normal.   Tearful during the exam   Vitals reviewed.      Assessment/Plan   Ivon was seen today  for annual exam.    Diagnoses and all orders for this visit:    Health care maintenance    Psychophysiological insomnia    Mixed hyperlipidemia  -     Lipid Panel; Future  -     Comprehensive Metabolic Panel; Future    Need for hepatitis C screening test  -     Hepatitis C Antibody; Future        Risk evaluation:  1. Cardiovascular risk factors: hypertension, hyperlipidemia, diabetes mellitus, sedentary life style   2. Diabetes risk factors: patient has diabetes and being treated.  3. Cancer risk factors: diabetes and skin type associated with high risk of skin cancers.  4. Risky behavior: none. Use of seat belts: regular. Use of sunscreens: regular. SPF 50.   Tattoos: one.  H/o blood transfusions/organ transplants before 1992 or clotting factor transfusion before 1987: none.     Prevention:  Cholesterol test  up to date. Cholesterol is mildly elevated. Please continue daily use of Atorvastatin..  Blood sugar test up to date. Fasting blood sugar Patient has diabetes and is under the care of endocrinologist..  Hep C testing (for patients born [id number]): discussed and recommended, will proceed with testing..  Mammogram up to date. Recommended frequency and time of the next screening per GYN.. Breast self exams recommended once a month.  Colonoscopy: will repeat Cologuard in 2021, was normal in 2018..  PAP smear : up to date. Recommendations per GYN..  DEXA : up to date. Recommendations per GYN..                      Insomnia - patient had been suffering from chronic insomnia and and had failed OTC options. Discussed sleep hygiene measures including regular sleep schedule, optimal sleep environment, and relaxing presleep rituals., Renato reviewed., Recommended daily exercise., Advised to avoid caffeinated beverages in the evening. and Needs to limit alcohol use. Will continue Triazolam.  Agreement was signed today.  Ivon Ortega will be under my care with regular visits for the reassessment of the  "treatment.    HTN - well controlled with current medication regimen. Normal kidney tests and electrolytes. Recommended low salt diet. Continue same medical treatment.  Hyperlipidemia - not well contrlled with statin. Normal liver function tests. LDL target is below < 70 mg/dl (\"very high\" risk for CHD). Patient's 89. Continue same treatment. Regular exercise recommended.  DM I - under the care of endocrine, Hb A1C is 7.3. Needs eye exam, already scheduled.  Postmenopausal osteoporosis - just was started on monthly Boniva by her GYN.  Fatty liver disease - Avoid alcohol, as \"fatty liver\" is much more susceptible to the damaging effect of alcohol and other toxins. Increased risk of cirrhosis explained. Also avoid use of over the counter Tylenol or Acetaminophen for pain/fever. Weight loss will be very beneficial. Regular exercise and \"healthy heart\" diet recommended.     "

## 2019-09-28 DIAGNOSIS — F41.9 ANXIETY: ICD-10-CM

## 2019-09-30 RX ORDER — DESVENLAFAXINE 100 MG/1
TABLET, EXTENDED RELEASE ORAL
Qty: 30 TABLET | Refills: 10 | Status: SHIPPED | OUTPATIENT
Start: 2019-09-30 | End: 2020-09-22 | Stop reason: SDUPTHER

## 2019-10-02 DIAGNOSIS — I10 ESSENTIAL HYPERTENSION: ICD-10-CM

## 2019-10-03 RX ORDER — LOSARTAN POTASSIUM AND HYDROCHLOROTHIAZIDE 12.5; 1 MG/1; MG/1
TABLET ORAL
Qty: 90 TABLET | Refills: 2 | Status: SHIPPED | OUTPATIENT
Start: 2019-10-03 | End: 2020-07-13

## 2019-10-28 ENCOUNTER — TELEPHONE (OUTPATIENT)
Dept: INTERNAL MEDICINE | Facility: CLINIC | Age: 54
End: 2019-10-28

## 2019-10-28 DIAGNOSIS — F51.04 PSYCHOPHYSIOLOGICAL INSOMNIA: ICD-10-CM

## 2019-11-12 ENCOUNTER — TELEPHONE (OUTPATIENT)
Dept: INTERNAL MEDICINE | Facility: CLINIC | Age: 54
End: 2019-11-12

## 2019-11-12 NOTE — TELEPHONE ENCOUNTER
Pt wants call back on A1C levels from July and April 2019; she is trying to get into some program with her insurance company and needs these readings; she said you may also leave her a voice mail if you do not reach her

## 2019-12-20 ENCOUNTER — TELEPHONE (OUTPATIENT)
Dept: ENDOCRINOLOGY | Age: 54
End: 2019-12-20

## 2019-12-20 NOTE — TELEPHONE ENCOUNTER
Patient left voice mail wanting a return call about her medication      left voice mail to let patient know that we have received the paperwork and it has been sign and faxed back to the mail order pharmacy

## 2020-01-02 RX ORDER — BLOOD SUGAR DIAGNOSTIC
STRIP MISCELLANEOUS
Qty: 600 EACH | Refills: 3
Start: 2020-01-02

## 2020-01-07 ENCOUNTER — TELEPHONE (OUTPATIENT)
Dept: ENDOCRINOLOGY | Age: 55
End: 2020-01-07

## 2020-01-07 NOTE — TELEPHONE ENCOUNTER
Patient called left voice mail let us know that we sent in the wrong  Test strips    Patient wanted test strip resent and lancets  Patient stated that she needed one touch lancets that was all that was given  Spoke with Kaden with [facility]

## 2020-01-16 RX ORDER — ATORVASTATIN CALCIUM 20 MG/1
TABLET, FILM COATED ORAL
Qty: 30 TABLET | Refills: 10 | Status: SHIPPED | OUTPATIENT
Start: 2020-01-16 | End: 2021-01-04

## 2020-02-10 ENCOUNTER — LAB (OUTPATIENT)
Dept: INTERNAL MEDICINE | Facility: CLINIC | Age: 55
End: 2020-02-10

## 2020-02-10 DIAGNOSIS — E78.2 MIXED HYPERLIPIDEMIA: ICD-10-CM

## 2020-02-10 DIAGNOSIS — Z11.59 NEED FOR HEPATITIS C SCREENING TEST: ICD-10-CM

## 2020-02-10 LAB
ALBUMIN SERPL-MCNC: 4.6 G/DL (ref 3.5–5.2)
ALBUMIN/GLOB SERPL: 2.1 G/DL
ALP SERPL-CCNC: 117 U/L (ref 39–117)
ALT SERPL W P-5'-P-CCNC: 12 U/L (ref 1–33)
ANION GAP SERPL CALCULATED.3IONS-SCNC: 12 MMOL/L (ref 5–15)
AST SERPL-CCNC: 16 U/L (ref 1–32)
BILIRUB SERPL-MCNC: 0.3 MG/DL (ref 0.2–1.2)
BUN BLD-MCNC: 11 MG/DL (ref 6–20)
BUN/CREAT SERPL: 16.2 (ref 7–25)
CALCIUM SPEC-SCNC: 9.3 MG/DL (ref 8.6–10.5)
CHLORIDE SERPL-SCNC: 96 MMOL/L (ref 98–107)
CHOLEST SERPL-MCNC: 181 MG/DL (ref 0–200)
CO2 SERPL-SCNC: 26 MMOL/L (ref 22–29)
CREAT BLD-MCNC: 0.68 MG/DL (ref 0.57–1)
GFR SERPL CREATININE-BSD FRML MDRD: 90 ML/MIN/1.73
GLOBULIN UR ELPH-MCNC: 2.2 GM/DL
GLUCOSE BLD-MCNC: 131 MG/DL (ref 65–99)
HCV AB SER DONR QL: NORMAL
HDLC SERPL-MCNC: 84 MG/DL (ref 40–60)
LDLC SERPL CALC-MCNC: 86 MG/DL (ref 0–100)
LDLC/HDLC SERPL: 1.03 {RATIO}
POTASSIUM BLD-SCNC: 3.8 MMOL/L (ref 3.5–5.2)
PROT SERPL-MCNC: 6.8 G/DL (ref 6–8.5)
SODIUM BLD-SCNC: 134 MMOL/L (ref 136–145)
TRIGL SERPL-MCNC: 54 MG/DL (ref 0–150)
VLDLC SERPL-MCNC: 10.8 MG/DL (ref 5–40)

## 2020-02-10 PROCEDURE — 80053 COMPREHEN METABOLIC PANEL: CPT | Performed by: INTERNAL MEDICINE

## 2020-02-10 PROCEDURE — 80061 LIPID PANEL: CPT | Performed by: INTERNAL MEDICINE

## 2020-02-10 PROCEDURE — 36415 COLL VENOUS BLD VENIPUNCTURE: CPT | Performed by: INTERNAL MEDICINE

## 2020-02-10 PROCEDURE — 86803 HEPATITIS C AB TEST: CPT | Performed by: INTERNAL MEDICINE

## 2020-02-28 ENCOUNTER — OFFICE VISIT (OUTPATIENT)
Dept: INTERNAL MEDICINE | Facility: CLINIC | Age: 55
End: 2020-02-28

## 2020-02-28 VITALS
SYSTOLIC BLOOD PRESSURE: 120 MMHG | BODY MASS INDEX: 22.82 KG/M2 | DIASTOLIC BLOOD PRESSURE: 72 MMHG | RESPIRATION RATE: 14 BRPM | WEIGHT: 137 LBS | HEIGHT: 65 IN

## 2020-02-28 DIAGNOSIS — F51.04 PSYCHOPHYSIOLOGICAL INSOMNIA: ICD-10-CM

## 2020-02-28 DIAGNOSIS — K76.0 FATTY INFILTRATION OF LIVER: ICD-10-CM

## 2020-02-28 DIAGNOSIS — E78.2 MIXED HYPERLIPIDEMIA: Primary | ICD-10-CM

## 2020-02-28 DIAGNOSIS — E10.65 TYPE 1 DIABETES MELLITUS WITH HYPERGLYCEMIA (HCC): ICD-10-CM

## 2020-02-28 PROBLEM — E10.10 DIABETIC KETOACIDOSIS WITHOUT COMA ASSOCIATED WITH TYPE 1 DIABETES MELLITUS: Status: RESOLVED | Noted: 2017-08-24 | Resolved: 2020-02-28

## 2020-02-28 LAB — HBA1C MFR BLD: 7.1 % (ref 4.8–5.6)

## 2020-02-28 PROCEDURE — 99214 OFFICE O/P EST MOD 30 MIN: CPT | Performed by: INTERNAL MEDICINE

## 2020-02-28 NOTE — PATIENT INSTRUCTIONS
"Hyperlipidemia - not well contrlled with statin. Normal liver function tests. LDL target is below < 70 mg/dl (\"very high\" risk for CHD). Patient's 84. Continue same treatment. Regular exercise recommended.  DM II - patient is under the care of endocrinologist, interested in checking her Hb A1C - has appointment with  in few weeks. Will check Hb A1C.  Insomnia - patient had been suffering from chronic insomnia and and had failed OTC options. Will continue Triazolam.    Ivon Ortega will be under my care with regular visits for the reassessment of the treatment.  Fatty liver disease - normal LFTs. Avoid alcohol, as \"fatty liver\" is much more susceptible to the damaging effect of alcohol and other toxins. Increased risk of cirrhosis explained. Also avoid use of over the counter Tylenol or Acetaminophen for pain/fever. Regular exercise and \"healthy heart\" diet recommended.    "

## 2020-02-28 NOTE — PROGRESS NOTES
"Subjective   Ivon Ortega is a 54 y.o. female.     History of Present Illness   Patient is here for hyperlipidemia, insomnia and fatty liver follow-up.    Ivon Ortega 54 y.o. female is here for chronic insomnia f/u. Patient had been suffering from insomnia for years. The major issue had been difficulty falling asleep. Contributing factors include anxiety. Patient had tried Halcion. Patient has found some relief while taking medication. Unfortunately, for the last year, she had been getting up at 2 sm, as she has to be at [facility] at 4 am..   Patient is compliant with medication.   Ivon Ortega has no history of substance abuse.       /72 (BP Location: Left arm, Patient Position: Sitting, Cuff Size: Adult)   Resp 14   Ht 165.1 cm (65\")   Wt 62.1 kg (137 lb)   BMI 22.80 kg/m²     Current Outpatient Medications:   •  atorvastatin (LIPITOR) 20 MG tablet, TAKE ONE TABLET BY MOUTH DAILY, Disp: 30 tablet, Rfl: 10  •  desvenlafaxine (PRISTIQ) 100 MG 24 hr tablet, TAKE ONE TABLET BY MOUTH DAILY, Disp: 30 tablet, Rfl: 10  •  glucose blood test strip, USE TO TEST BLOOD SUGAR 6 TIMES DAILY  ONE TOUCH ULTRA TEST STRIPS, Disp: 600 each, Rfl: 3  •  Ibandronate Sodium (BONIVA PO), Take  by mouth., Disp: , Rfl:   •  insulin aspart (novoLOG FLEXPEN) 100 UNIT/ML solution pen-injector sc pen, Novolog 1 units for 7 gm for BF, 1 unit for 6 gm for lunch, 1 unit for 8 gm for dinner.max daily 50 units, Disp: 15 pen, Rfl: 3  •  insulin degludec (TRESIBA FLEXTOUCH) 100 UNIT/ML solution pen-injector injection, Inject 24 Units under the skin into the appropriate area as directed Every Night., Disp: 10 pen, Rfl: 3  •  losartan-hydrochlorothiazide (HYZAAR) 100-12.5 MG per tablet, TAKE ONE TABLET BY MOUTH DAILY, Disp: 90 tablet, Rfl: 2  •  MILK THISTLE PO, Take  by mouth Daily., Disp: , Rfl:   •  ONETOUCH VERIO test strip, USE TO TEST BLOOD SUGAR 6 TIMES DAILY, Disp: 600 each, Rfl: 3  •  triazolam (HALCION) 0.25 MG tablet, Take " "1.5 tablets by mouth At Night As Needed for Sleep., Disp: 45 tablet, Rfl: 5  •  TURMERIC PO, Take  by mouth., Disp: , Rfl:     Patient has been diagnosed with hyperlipidemia. Target LDL is < 70 mg/dl (\"very high\" risk for CHD). Patient is on low fat diet with good compliance. Patient is compliant with treatment: daily use of Lipitor (atorvastatin). Side effects of medication: none.    Patient has been diagnosed with fatty liver disease.  Low-carb diet had been recommended.  Needs regular exercise.  Good good compliance with diet.     The following portions of the patient's history were reviewed and updated as appropriate: allergies, current medications, past family history, past medical history, past social history, past surgical history and problem list.    Review of Systems   Constitutional: Negative for chills and fever.   Eyes: Negative for pain and redness.   Respiratory: Negative for cough and shortness of breath.    Cardiovascular: Negative for chest pain and leg swelling.   Neurological: Negative for dizziness and headaches.       Objective   Physical Exam   Constitutional: She is oriented to person, place, and time. She appears well-developed and well-nourished.   HENT:   Head: Normocephalic and atraumatic.   Right Ear: Tympanic membrane, external ear and ear canal normal.   Left Ear: Tympanic membrane, external ear and ear canal normal.   Nose: Nose normal. Right sinus exhibits no maxillary sinus tenderness and no frontal sinus tenderness. Left sinus exhibits no maxillary sinus tenderness and no frontal sinus tenderness.   Mouth/Throat: Uvula is midline, oropharynx is clear and moist and mucous membranes are normal.   Eyes: Pupils are equal, round, and reactive to light. Conjunctivae and EOM are normal. Right eye exhibits no discharge. Left eye exhibits no discharge. No scleral icterus.   Neck: Neck supple. No JVD present.   Cardiovascular: Normal rate, regular rhythm and normal heart sounds. Exam reveals " "no gallop and no friction rub.   No murmur heard.  Pulmonary/Chest: Effort normal and breath sounds normal. She has no wheezes. She has no rales.   Musculoskeletal: She exhibits no edema.   Lymphadenopathy:     She has no cervical adenopathy.   Neurological: She is alert and oriented to person, place, and time. No cranial nerve deficit.   Skin: Skin is warm and dry. No rash noted.   Psychiatric: She has a normal mood and affect. Her behavior is normal.   Vitals reviewed.      Assessment/Plan   Ivon was seen today for insomnia and hyperlipidemia.    Diagnoses and all orders for this visit:    Mixed hyperlipidemia    Fatty infiltration of liver    Psychophysiological insomnia    Type 1 diabetes mellitus with hyperglycemia (CMS/HCC)  -     Hemoglobin A1c           Hyperlipidemia - not well contrlled with statin. Normal liver function tests. LDL target is below < 70 mg/dl (\"very high\" risk for CHD). Patient's 84. Continue same treatment. Regular exercise recommended.  DM II - patient is under the care of endocrinologist, interested in checking her Hb A1C - has appointment with  in few weeks. Will check Hb A1C.  Insomnia - patient had been suffering from chronic insomnia and and had failed OTC options. Will continue Triazolam.  Renato report reviewed. [facility] Controlled substance agreement was signed and is in EHR. Patient is compliant with medication and takes it according to the directions. Renato reports are being reviewed at least every 6 months., I will continue to monitor clinical progress with regualar office visits, random pill counts and urine drug screens..  Ivon Ortega will be under my care with regular visits for the reassessment of the treatment.  Fatty liver disease - normal LFTs. Avoid alcohol, as \"fatty liver\" is much more susceptible to the damaging effect of alcohol and other toxins. Increased risk of cirrhosis explained. Also avoid use of over the counter Tylenol or " "Acetaminophen for pain/fever. Regular exercise and \"healthy heart\" diet recommended.     "

## 2020-03-24 ENCOUNTER — OFFICE VISIT (OUTPATIENT)
Dept: ENDOCRINOLOGY | Age: 55
End: 2020-03-24

## 2020-03-24 VITALS
DIASTOLIC BLOOD PRESSURE: 70 MMHG | BODY MASS INDEX: 22.82 KG/M2 | HEART RATE: 82 BPM | SYSTOLIC BLOOD PRESSURE: 122 MMHG | HEIGHT: 65 IN | WEIGHT: 137 LBS | OXYGEN SATURATION: 98 %

## 2020-03-24 DIAGNOSIS — E78.2 MIXED HYPERLIPIDEMIA: ICD-10-CM

## 2020-03-24 DIAGNOSIS — E10.43 TYPE 1 DIABETES MELLITUS WITH DIABETIC AUTONOMIC NEUROPATHY (HCC): Primary | ICD-10-CM

## 2020-03-24 PROCEDURE — 99214 OFFICE O/P EST MOD 30 MIN: CPT | Performed by: INTERNAL MEDICINE

## 2020-03-24 RX ORDER — BLOOD PRESSURE TEST KIT-LARGE
KIT MISCELLANEOUS
COMMUNITY
Start: 2020-02-28

## 2020-03-24 NOTE — PROGRESS NOTES
54 y.o.    Patient Care Team:  Marlen Sousa MD as PCP - General (Internal Medicine)  Shyam Zuniga MD as Consulting Physician (Endocrinology)    Chief Complaint:    Follow up/ type 1 diabetes mellitus  Subjective     HPI     Ivon CORRALES Jordan,54 y.o. WF is here as follow up for the management of type 1 dm.      Type 1 dm - Diagnosed about 11 years ago. Insulin was started about 10 years ago.   Today in clinic patient reports that she is on Tresiba 12-19 units in the morning.  She varies this dosage based on her blood sugar trends.  NovoLog 1 unit for 6-7 g of carbohydrate with breakfast lunch and dinner.  1 unit for 50/150 as a correction.  She pretty much checks her blood sugar 7-8 times a day.  Average blood sugars are around 160-170.  She continues to have occasional low blood sugars around 60-70.  Does have hypoglycemic awareness.  She is currently taken off from work due to the covid 19  Highest blood sugar was around 295.  Last eye examination was within the last 1 year, no history of diabetic retinopathy.  No complaints of tingling and numbness in her bilateral feet.  No ulcers or amputations of her toes.  She is physically active and her weight stable.   She does report that she is comfortable counting carbohydrates.   On ARB.  Last DKA was in Aug 2017 and that was her first episode of DKA.      HLP   On liptor 20 mg po daily.        Reviewed primary care physician's/consulting physician documentation and lab results :     Interval History      The following portions of the patient's history were reviewed and updated as appropriate: allergies, current medications, past family history, past medical history, past social history, past surgical history and problem list.    Past Medical History:   Diagnosis Date   • Anxiety    • Arthralgia of hip 2/10/2016   • Arthritis    • Diabetes mellitus (CMS/HCC)    • Diabetic ketoacidosis without coma associated with type 1 diabetes mellitus (CMS/HCC) 8/24/2017     2017   • Esophageal candidiasis (CMS/HCC)    • Hyperlipidemia    • Hypertension    • Insomnia    • Pregnancy        • Thyroid disease      Family History   Adopted: Yes     Social History     Socioeconomic History   • Marital status:      Spouse name: Not on file   • Number of children: Not on file   • Years of education: Not on file   • Highest education level: Not on file   Occupational History   • Occupation:      Comment: full time   Tobacco Use   • Smoking status: Former Smoker     Packs/day: 1.00     Years: 5.00     Pack years: 5.00     Types: Cigarettes     Last attempt to quit:      Years since quittin.2   • Smokeless tobacco: Never Used   Substance and Sexual Activity   • Alcohol use: Yes   • Drug use: No   • Sexual activity: Defer     Allergies   Allergen Reactions   • Ampicillin Diarrhea       Current Outpatient Medications:   •  atorvastatin (LIPITOR) 20 MG tablet, TAKE ONE TABLET BY MOUTH DAILY, Disp: 30 tablet, Rfl: 10  •  Blood Pressure Monitoring (5 SERIES BP MONITOR) device, , Disp: , Rfl:   •  desvenlafaxine (PRISTIQ) 100 MG 24 hr tablet, TAKE ONE TABLET BY MOUTH DAILY, Disp: 30 tablet, Rfl: 10  •  glucose blood test strip, USE TO TEST BLOOD SUGAR 6 TIMES DAILY  ONE TOUCH ULTRA TEST STRIPS, Disp: 600 each, Rfl: 3  •  Ibandronate Sodium (BONIVA PO), Take  by mouth., Disp: , Rfl:   •  insulin aspart (novoLOG FLEXPEN) 100 UNIT/ML solution pen-injector sc pen, Novolog 1 units for 7 gm for BF, 1 unit for 6 gm for lunch, 1 unit for 8 gm for dinner.max daily 50 units, Disp: 15 pen, Rfl: 3  •  insulin degludec (TRESIBA FLEXTOUCH) 100 UNIT/ML solution pen-injector injection, Inject 24 Units under the skin into the appropriate area as directed Every Night., Disp: 10 pen, Rfl: 3  •  losartan-hydrochlorothiazide (HYZAAR) 100-12.5 MG per tablet, TAKE ONE TABLET BY MOUTH DAILY, Disp: 90 tablet, Rfl: 2  •  MILK THISTLE PO, Take  by mouth Daily., Disp: ,  "Rfl:   •  ONETOUCH VERIO test strip, USE TO TEST BLOOD SUGAR 6 TIMES DAILY, Disp: 600 each, Rfl: 3  •  triazolam (HALCION) 0.25 MG tablet, Take 1.5 tablets by mouth At Night As Needed for Sleep., Disp: 45 tablet, Rfl: 5  •  TURMERIC PO, Take  by mouth., Disp: , Rfl:         Review of Systems   Constitutional: Negative for appetite change, fatigue and fever.   Eyes: Negative for visual disturbance.   Respiratory: Negative for shortness of breath.    Cardiovascular: Negative for palpitations and leg swelling.   Gastrointestinal: Negative for abdominal pain and vomiting.   Endocrine: Negative for polydipsia and polyuria.   Musculoskeletal: Negative for joint swelling and neck pain.   Skin: Negative for rash.   Neurological: Negative for weakness and numbness.   Psychiatric/Behavioral: Negative for behavioral problems.     I have reviewed the ROS as documented by the MA; Shyam Zuniga MD.      Objective       Vitals:    03/24/20 1356   BP: 122/70   Pulse: 82   SpO2: 98%   Weight: 62.1 kg (137 lb)   Height: 165.1 cm (65\")     Body mass index is 22.8 kg/m².      Physical Exam   Constitutional: She is oriented to person, place, and time. She appears well-nourished.   HENT:   Head: Normocephalic and atraumatic.   Eyes: Conjunctivae and EOM are normal. No scleral icterus.   Neck: Normal range of motion. Neck supple. No thyromegaly present.   Cardiovascular: Normal rate and normal heart sounds. Exam reveals no friction rub.   No murmur heard.  Pulmonary/Chest: Effort normal and breath sounds normal. No stridor. She has no wheezes. She has no rales.   Abdominal: Soft. Bowel sounds are normal. She exhibits no distension. There is no tenderness.   Musculoskeletal: She exhibits no edema or tenderness.   Lymphadenopathy:     She has no cervical adenopathy.   Neurological: She is alert and oriented to person, place, and time.   Skin: Skin is warm and dry. She is not diaphoretic.   Psychiatric: She has a normal mood and affect. "   Vitals reviewed.    Results Review:     I reviewed the patient's new clinical results and mentioned them above in HPI and in plan as well.    Medical records reviewed  Summary:Done      Office Visit on 02/28/2020   Component Date Value Ref Range Status   • Hemoglobin A1C 02/28/2020 7.10* 4.80 - 5.60 % Final    Comment: Hemoglobin A1C Ranges:  Increased Risk for Diabetes  5.7% to 6.4%  Diabetes                     >= 6.5%  Diabetic Goal                < 7.0%       Lab Results   Component Value Date    HGBA1C 7.10 (H) 02/28/2020    HGBA1C 7.36 (H) 08/02/2019    HGBA1C 7.50 (H) 04/22/2019     Lab Results   Component Value Date    MICROALBUR <1.2 08/02/2019    CREATININE 0.68 02/10/2020     Imaging Results (Most Recent)     None                Assessment and Plan:    Ivon was seen today for diabetes.    Diagnoses and all orders for this visit:    Type 1 diabetes mellitus with diabetic autonomic neuropathy (CMS/[facility])  -     TSH; Future  -     T4, Free; Future  -     Hemoglobin A1c; Future  -     Basic Metabolic Panel; Future  -     Microalbumin / Creatinine Urine Ratio - Urine, Clean Catch; Future  -     Lipid Panel; Future  -     Vitamin B12 & Folate; Future  -     Vitamin D 25 Hydroxy; Future    Mixed hyperlipidemia  -     TSH; Future  -     T4, Free; Future  -     Hemoglobin A1c; Future  -     Basic Metabolic Panel; Future  -     Microalbumin / Creatinine Urine Ratio - Urine, Clean Catch; Future  -     Lipid Panel; Future  -     Vitamin B12 & Folate; Future  -     Vitamin D 25 Hydroxy; Future      Type 1 diabetes mellitus-uncontrolled complicated with variable blood sugars  Continue the current insulin regimen  Continue the current NovoLog regimen  Explained to the patient that variability of these dosages would also increase the risk of hypoglycemia and advised the patient to use a consistent dosage for most part.    Hyperlipidemia  Continue Lipitor.    Discussed with the patient again the benefits of insulin  "pump and sensor which she does not want to pursue at this time given the insurances changes.    Reviewed Lab results with the patient.             Shyam Zuniga MD  03/24/20    EM[facility] Dragon / transcription disclaimer:     \"Dictated utilizing Dragon dictation\".  "

## 2020-06-22 ENCOUNTER — RESULTS ENCOUNTER (OUTPATIENT)
Dept: ENDOCRINOLOGY | Age: 55
End: 2020-06-22

## 2020-06-22 DIAGNOSIS — E78.2 MIXED HYPERLIPIDEMIA: ICD-10-CM

## 2020-06-22 DIAGNOSIS — E10.43 TYPE 1 DIABETES MELLITUS WITH DIABETIC AUTONOMIC NEUROPATHY (HCC): ICD-10-CM

## 2020-07-12 DIAGNOSIS — I10 ESSENTIAL HYPERTENSION: ICD-10-CM

## 2020-07-13 ENCOUNTER — APPOINTMENT (OUTPATIENT)
Dept: GENERAL RADIOLOGY | Facility: HOSPITAL | Age: 55
End: 2020-07-13

## 2020-07-13 ENCOUNTER — HOSPITAL ENCOUNTER (OUTPATIENT)
Facility: HOSPITAL | Age: 55
Setting detail: OBSERVATION
Discharge: HOME | End: 2020-07-14
Attending: EMERGENCY MEDICINE | Admitting: HOSPITALIST

## 2020-07-13 DIAGNOSIS — I10 HYPERTENSION, UNSPECIFIED TYPE: ICD-10-CM

## 2020-07-13 DIAGNOSIS — Z87.891 HISTORY OF TOBACCO USE: ICD-10-CM

## 2020-07-13 DIAGNOSIS — Z86.79 HISTORY OF HYPERTENSION: ICD-10-CM

## 2020-07-13 DIAGNOSIS — R73.9 HYPERGLYCEMIA: ICD-10-CM

## 2020-07-13 DIAGNOSIS — R07.9 CHEST PAIN, UNSPECIFIED TYPE: Primary | ICD-10-CM

## 2020-07-13 DIAGNOSIS — Z86.39 HISTORY OF DIABETES MELLITUS: ICD-10-CM

## 2020-07-13 LAB
BASOPHILS # BLD AUTO: 0.02 10*3/MM3 (ref 0–0.2)
BASOPHILS NFR BLD AUTO: 0.3 % (ref 0–1.5)
DEPRECATED RDW RBC AUTO: 45 FL (ref 37–54)
EOSINOPHIL # BLD AUTO: 0.1 10*3/MM3 (ref 0–0.4)
EOSINOPHIL NFR BLD AUTO: 1.4 % (ref 0.3–6.2)
ERYTHROCYTE [DISTWIDTH] IN BLOOD BY AUTOMATED COUNT: 13 % (ref 12.3–15.4)
HCT VFR BLD AUTO: 40.6 % (ref 34–46.6)
HGB BLD-MCNC: 13.4 G/DL (ref 12–15.9)
IMM GRANULOCYTES # BLD AUTO: 0.01 10*3/MM3 (ref 0–0.05)
IMM GRANULOCYTES NFR BLD AUTO: 0.1 % (ref 0–0.5)
LYMPHOCYTES # BLD AUTO: 3.06 10*3/MM3 (ref 0.7–3.1)
LYMPHOCYTES NFR BLD AUTO: 41.7 % (ref 19.6–45.3)
MCH RBC QN AUTO: 31.3 PG (ref 26.6–33)
MCHC RBC AUTO-ENTMCNC: 33 G/DL (ref 31.5–35.7)
MCV RBC AUTO: 94.9 FL (ref 79–97)
MONOCYTES # BLD AUTO: 0.52 10*3/MM3 (ref 0.1–0.9)
MONOCYTES NFR BLD AUTO: 7.1 % (ref 5–12)
NEUTROPHILS NFR BLD AUTO: 3.62 10*3/MM3 (ref 1.7–7)
NEUTROPHILS NFR BLD AUTO: 49.4 % (ref 42.7–76)
NRBC BLD AUTO-RTO: 0 /100 WBC (ref 0–0.2)
PLATELET # BLD AUTO: 266 10*3/MM3 (ref 140–450)
PMV BLD AUTO: 11.2 FL (ref 6–12)
RBC # BLD AUTO: 4.28 10*6/MM3 (ref 3.77–5.28)
WBC # BLD AUTO: 7.33 10*3/MM3 (ref 3.4–10.8)

## 2020-07-13 PROCEDURE — 83690 ASSAY OF LIPASE: CPT | Performed by: EMERGENCY MEDICINE

## 2020-07-13 PROCEDURE — G0378 HOSPITAL OBSERVATION PER HR: HCPCS

## 2020-07-13 PROCEDURE — 84484 ASSAY OF TROPONIN QUANT: CPT | Performed by: EMERGENCY MEDICINE

## 2020-07-13 PROCEDURE — 93005 ELECTROCARDIOGRAM TRACING: CPT

## 2020-07-13 PROCEDURE — 99284 EMERGENCY DEPT VISIT MOD MDM: CPT

## 2020-07-13 PROCEDURE — 83735 ASSAY OF MAGNESIUM: CPT | Performed by: EMERGENCY MEDICINE

## 2020-07-13 PROCEDURE — 71045 X-RAY EXAM CHEST 1 VIEW: CPT

## 2020-07-13 PROCEDURE — 93005 ELECTROCARDIOGRAM TRACING: CPT | Performed by: EMERGENCY MEDICINE

## 2020-07-13 PROCEDURE — 85025 COMPLETE CBC W/AUTO DIFF WBC: CPT | Performed by: EMERGENCY MEDICINE

## 2020-07-13 PROCEDURE — 80053 COMPREHEN METABOLIC PANEL: CPT | Performed by: EMERGENCY MEDICINE

## 2020-07-13 PROCEDURE — 93010 ELECTROCARDIOGRAM REPORT: CPT | Performed by: INTERNAL MEDICINE

## 2020-07-13 RX ORDER — LOSARTAN POTASSIUM AND HYDROCHLOROTHIAZIDE 12.5; 1 MG/1; MG/1
TABLET ORAL
Qty: 90 TABLET | Refills: 1 | Status: SHIPPED | OUTPATIENT
Start: 2020-07-13 | End: 2020-09-01

## 2020-07-14 ENCOUNTER — READMISSION MANAGEMENT (OUTPATIENT)
Dept: CALL CENTER | Facility: HOSPITAL | Age: 55
End: 2020-07-14

## 2020-07-14 ENCOUNTER — APPOINTMENT (OUTPATIENT)
Dept: CARDIOLOGY | Facility: HOSPITAL | Age: 55
End: 2020-07-14

## 2020-07-14 VITALS
RESPIRATION RATE: 16 BRPM | SYSTOLIC BLOOD PRESSURE: 146 MMHG | HEART RATE: 76 BPM | HEIGHT: 65 IN | DIASTOLIC BLOOD PRESSURE: 100 MMHG | TEMPERATURE: 97.5 F | BODY MASS INDEX: 22.56 KG/M2 | OXYGEN SATURATION: 97 % | WEIGHT: 135.4 LBS

## 2020-07-14 PROBLEM — E87.6 HYPOKALEMIA: Status: ACTIVE | Noted: 2020-07-14

## 2020-07-14 PROBLEM — R07.9 CHEST PAIN: Status: ACTIVE | Noted: 2020-07-14

## 2020-07-14 LAB
ALBUMIN SERPL-MCNC: 4.5 G/DL (ref 3.5–5.2)
ALBUMIN/GLOB SERPL: 1.7 G/DL
ALP SERPL-CCNC: 104 U/L (ref 39–117)
ALT SERPL W P-5'-P-CCNC: 12 U/L (ref 1–33)
ANION GAP SERPL CALCULATED.3IONS-SCNC: 10.1 MMOL/L (ref 5–15)
AST SERPL-CCNC: 16 U/L (ref 1–32)
BACTERIA UR QL AUTO: NORMAL /HPF
BH CV STRESS BP STAGE 1: NORMAL
BH CV STRESS BP STAGE 2: NORMAL
BH CV STRESS BP STAGE 3: NORMAL
BH CV STRESS DURATION MIN STAGE 1: 3
BH CV STRESS DURATION MIN STAGE 2: 3
BH CV STRESS DURATION MIN STAGE 3: 3
BH CV STRESS DURATION SEC STAGE 1: 0
BH CV STRESS DURATION SEC STAGE 2: 0
BH CV STRESS DURATION SEC STAGE 3: 0
BH CV STRESS GRADE STAGE 1: 10
BH CV STRESS GRADE STAGE 2: 12
BH CV STRESS GRADE STAGE 3: 14
BH CV STRESS HR STAGE 1: 114
BH CV STRESS HR STAGE 2: 138
BH CV STRESS HR STAGE 3: 148
BH CV STRESS METS STAGE 1: 5
BH CV STRESS METS STAGE 2: 7.5
BH CV STRESS METS STAGE 3: 10
BH CV STRESS PROTOCOL 1: NORMAL
BH CV STRESS RECOVERY BP: NORMAL MMHG
BH CV STRESS RECOVERY HR: 94 BPM
BH CV STRESS SPEED STAGE 1: 1.7
BH CV STRESS SPEED STAGE 2: 2.5
BH CV STRESS SPEED STAGE 3: 3.4
BH CV STRESS STAGE 1: 1
BH CV STRESS STAGE 2: 2
BH CV STRESS STAGE 3: 3
BILIRUB SERPL-MCNC: 0.4 MG/DL (ref 0–1.2)
BILIRUB UR QL STRIP: NEGATIVE
BUN SERPL-MCNC: 8 MG/DL (ref 6–20)
BUN/CREAT SERPL: 11.3 (ref 7–25)
CALCIUM SPEC-SCNC: 9.6 MG/DL (ref 8.6–10.5)
CHLORIDE SERPL-SCNC: 95 MMOL/L (ref 98–107)
CLARITY UR: CLEAR
CO2 SERPL-SCNC: 27.9 MMOL/L (ref 22–29)
COLOR UR: YELLOW
CREAT SERPL-MCNC: 0.71 MG/DL (ref 0.57–1)
GFR SERPL CREATININE-BSD FRML MDRD: 85 ML/MIN/1.73
GLOBULIN UR ELPH-MCNC: 2.7 GM/DL
GLUCOSE BLDC GLUCOMTR-MCNC: 212 MG/DL (ref 70–130)
GLUCOSE BLDC GLUCOMTR-MCNC: 244 MG/DL (ref 70–130)
GLUCOSE SERPL-MCNC: 237 MG/DL (ref 65–99)
GLUCOSE UR STRIP-MCNC: ABNORMAL MG/DL
HGB UR QL STRIP.AUTO: ABNORMAL
HYALINE CASTS UR QL AUTO: NORMAL /LPF
KETONES UR QL STRIP: NEGATIVE
LEUKOCYTE ESTERASE UR QL STRIP.AUTO: NEGATIVE
LIPASE SERPL-CCNC: 41 U/L (ref 13–60)
MAGNESIUM SERPL-MCNC: 1.9 MG/DL (ref 1.6–2.6)
MAXIMAL PREDICTED HEART RATE: 163 BPM
NITRITE UR QL STRIP: NEGATIVE
PERCENT MAX PREDICTED HR: 92.02 %
PH UR STRIP.AUTO: 6.5 [PH] (ref 5–8)
POTASSIUM SERPL-SCNC: 3.1 MMOL/L (ref 3.5–5.2)
PROT SERPL-MCNC: 7.2 G/DL (ref 6–8.5)
PROT UR QL STRIP: NEGATIVE
RBC # UR: NORMAL /HPF
REF LAB TEST METHOD: NORMAL
SODIUM SERPL-SCNC: 133 MMOL/L (ref 136–145)
SP GR UR STRIP: 1.01 (ref 1–1.03)
SQUAMOUS #/AREA URNS HPF: NORMAL /HPF
STRESS BASELINE BP: NORMAL MMHG
STRESS BASELINE HR: 76 BPM
STRESS PERCENT HR: 108 %
STRESS POST ESTIMATED WORKLOAD: 10.2 METS
STRESS POST EXERCISE DUR MIN: 9 MIN
STRESS POST EXERCISE DUR SEC: 0 SEC
STRESS POST PEAK BP: NORMAL MMHG
STRESS POST PEAK HR: 150 BPM
STRESS TARGET HR: 139 BPM
TROPONIN T SERPL-MCNC: <0.01 NG/ML (ref 0–0.03)
TSH SERPL DL<=0.05 MIU/L-ACNC: 2.01 UIU/ML (ref 0.27–4.2)
UROBILINOGEN UR QL STRIP: ABNORMAL
WBC UR QL AUTO: NORMAL /HPF

## 2020-07-14 PROCEDURE — 93017 CV STRESS TEST TRACING ONLY: CPT

## 2020-07-14 PROCEDURE — 81001 URINALYSIS AUTO W/SCOPE: CPT | Performed by: EMERGENCY MEDICINE

## 2020-07-14 PROCEDURE — 84443 ASSAY THYROID STIM HORMONE: CPT | Performed by: NURSE PRACTITIONER

## 2020-07-14 PROCEDURE — G0378 HOSPITAL OBSERVATION PER HR: HCPCS

## 2020-07-14 PROCEDURE — 93016 CV STRESS TEST SUPVJ ONLY: CPT | Performed by: INTERNAL MEDICINE

## 2020-07-14 PROCEDURE — 82962 GLUCOSE BLOOD TEST: CPT

## 2020-07-14 PROCEDURE — 93018 CV STRESS TEST I&R ONLY: CPT | Performed by: INTERNAL MEDICINE

## 2020-07-14 PROCEDURE — 84484 ASSAY OF TROPONIN QUANT: CPT | Performed by: NURSE PRACTITIONER

## 2020-07-14 PROCEDURE — 99242 OFF/OP CONSLTJ NEW/EST SF 20: CPT | Performed by: INTERNAL MEDICINE

## 2020-07-14 PROCEDURE — 36415 COLL VENOUS BLD VENIPUNCTURE: CPT | Performed by: NURSE PRACTITIONER

## 2020-07-14 RX ORDER — NICOTINE POLACRILEX 4 MG
15 LOZENGE BUCCAL
Status: DISCONTINUED | OUTPATIENT
Start: 2020-07-14 | End: 2020-07-14 | Stop reason: HOSPADM

## 2020-07-14 RX ORDER — NITROGLYCERIN 0.4 MG/1
0.4 TABLET SUBLINGUAL
Status: DISCONTINUED | OUTPATIENT
Start: 2020-07-14 | End: 2020-07-14

## 2020-07-14 RX ORDER — NITROGLYCERIN 0.4 MG/1
0.4 TABLET SUBLINGUAL
Status: DISCONTINUED | OUTPATIENT
Start: 2020-07-14 | End: 2020-07-14 | Stop reason: HOSPADM

## 2020-07-14 RX ORDER — POTASSIUM CHLORIDE 750 MG/1
40 CAPSULE, EXTENDED RELEASE ORAL AS NEEDED
Status: DISCONTINUED | OUTPATIENT
Start: 2020-07-14 | End: 2020-07-14 | Stop reason: HOSPADM

## 2020-07-14 RX ORDER — DEXTROSE MONOHYDRATE 25 G/50ML
25 INJECTION, SOLUTION INTRAVENOUS
Status: DISCONTINUED | OUTPATIENT
Start: 2020-07-14 | End: 2020-07-14 | Stop reason: HOSPADM

## 2020-07-14 RX ORDER — POTASSIUM CHLORIDE 1.5 G/1.77G
40 POWDER, FOR SOLUTION ORAL AS NEEDED
Status: DISCONTINUED | OUTPATIENT
Start: 2020-07-14 | End: 2020-07-14 | Stop reason: HOSPADM

## 2020-07-14 RX ORDER — AMLODIPINE BESYLATE 5 MG/1
5 TABLET ORAL DAILY
Qty: 30 TABLET | Refills: 0 | Status: SHIPPED | OUTPATIENT
Start: 2020-07-14 | End: 2020-09-01

## 2020-07-14 RX ORDER — ACETAMINOPHEN 325 MG/1
650 TABLET ORAL EVERY 4 HOURS PRN
Status: DISCONTINUED | OUTPATIENT
Start: 2020-07-14 | End: 2020-07-14 | Stop reason: HOSPADM

## 2020-07-14 RX ORDER — SODIUM CHLORIDE 9 MG/ML
100 INJECTION, SOLUTION INTRAVENOUS CONTINUOUS
Status: DISCONTINUED | OUTPATIENT
Start: 2020-07-14 | End: 2020-07-14 | Stop reason: HOSPADM

## 2020-07-14 RX ORDER — ACETAMINOPHEN 650 MG/1
650 SUPPOSITORY RECTAL EVERY 4 HOURS PRN
Status: DISCONTINUED | OUTPATIENT
Start: 2020-07-14 | End: 2020-07-14 | Stop reason: HOSPADM

## 2020-07-14 RX ORDER — ACETAMINOPHEN 160 MG/5ML
650 SOLUTION ORAL EVERY 4 HOURS PRN
Status: DISCONTINUED | OUTPATIENT
Start: 2020-07-14 | End: 2020-07-14 | Stop reason: HOSPADM

## 2020-07-14 RX ORDER — ONDANSETRON 2 MG/ML
4 INJECTION INTRAMUSCULAR; INTRAVENOUS EVERY 6 HOURS PRN
Status: DISCONTINUED | OUTPATIENT
Start: 2020-07-14 | End: 2020-07-14 | Stop reason: HOSPADM

## 2020-07-14 RX ORDER — SODIUM CHLORIDE 0.9 % (FLUSH) 0.9 %
10 SYRINGE (ML) INJECTION AS NEEDED
Status: DISCONTINUED | OUTPATIENT
Start: 2020-07-14 | End: 2020-07-14 | Stop reason: HOSPADM

## 2020-07-14 RX ORDER — SODIUM CHLORIDE 0.9 % (FLUSH) 0.9 %
10 SYRINGE (ML) INJECTION EVERY 12 HOURS SCHEDULED
Status: DISCONTINUED | OUTPATIENT
Start: 2020-07-14 | End: 2020-07-14 | Stop reason: HOSPADM

## 2020-07-14 RX ORDER — POTASSIUM CHLORIDE 7.45 MG/ML
10 INJECTION INTRAVENOUS
Status: DISCONTINUED | OUTPATIENT
Start: 2020-07-14 | End: 2020-07-14 | Stop reason: HOSPADM

## 2020-07-14 RX ADMIN — NITROGLYCERIN 0.4 MG: 0.4 TABLET SUBLINGUAL at 00:40

## 2020-07-14 RX ADMIN — ACETAMINOPHEN 650 MG: 325 TABLET ORAL at 16:03

## 2020-07-14 RX ADMIN — POTASSIUM CHLORIDE 40 MEQ: 10 CAPSULE, COATED, EXTENDED RELEASE ORAL at 06:20

## 2020-07-14 RX ADMIN — POTASSIUM CHLORIDE 40 MEQ: 10 CAPSULE, COATED, EXTENDED RELEASE ORAL at 02:40

## 2020-07-14 RX ADMIN — Medication 10 ML: at 06:05

## 2020-07-14 RX ADMIN — SODIUM CHLORIDE 100 ML/HR: 9 INJECTION, SOLUTION INTRAVENOUS at 06:20

## 2020-07-14 NOTE — ED TRIAGE NOTES
"Pt from home for nausea, chest pressure and HTN. Pt reports she has Hx of HTN and is on medication for it but has not been taking it as prescribed \"some weeks I take it twice a week some times I take it 4 times a week\"  Pt denies SOA with chest pressure. Pt has headache at this time.  Pt reports she took 81 mg ASA 1.5 hours ago. 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    "

## 2020-07-14 NOTE — NURSING NOTE
Pt not agreeable to letting us use our glucometer and insists on using her own. She is also only agreeable to using her own supply of insulin.

## 2020-07-14 NOTE — H&P
"    Patient Name:  Ivon Ortega  YOB: 1965  MRN:  [id number]  Admit Date:  7/13/2020  Patient Care Team:  Les Soto MD as PCP - General (Family Medicine)  Shyam Zuniga MD as Consulting Physician (Endocrinology)      Subjective   History Present Illness     Chief Complaint   Patient presents with   • Chest Pain   • Hypertension   • Nausea     History of Present Illness   Mrs. Ortega is a 55-year-old female with history of hypertension, anxiety depression, hyperlipidemia who presents to the emergency room with chest pain.  Patient states she has had some nausea and dizziness over the last few weeks, but today she started with chest pain this a.m. that is gradually worsened, not relieved with anything, not made worse with anything.  She states she has been trying to wean herself off her anxiety medication and her hypertensive medication because she thinks \"it was not working\".  Patient describes the chest pain as more tightness, nonradiating.  She denies any fever, cough, chills.  She has had no vomiting, although she has had some nausea today.  She did take 2 baby aspirin earlier today.  She was a recent smoker who quit about 4 to 6 months ago.  She is also a type I diabetic, and was in the hospital in January with DKA.  In the emergency room patient troponin negative, glucose 237, sodium 133, potassium 3.1, creatinine 0.71, BUN 8, lipase 41, magnesium 1.9, white blood cell count 7.3, hemoglobin 13.  Urinalysis is negative.  Chest x-ray shows no acute findings.  EKG shows normal sinus rhythm.    Review of Systems   Constitutional: Negative for appetite change and fever.   HENT: Negative for nosebleeds and trouble swallowing.    Eyes: Negative for photophobia, redness and visual disturbance.   Respiratory: Negative for cough, chest tightness, shortness of breath and wheezing.    Cardiovascular: Negative for chest pain, palpitations and leg swelling.   Gastrointestinal: Negative for " abdominal distention, abdominal pain, nausea and vomiting.   Endocrine: Negative.    Genitourinary: Negative.    Musculoskeletal: Negative for gait problem and joint swelling.   Skin: Negative.    Neurological: Negative for dizziness, seizures, speech difficulty, light-headedness and headaches.   Hematological: Negative.    Psychiatric/Behavioral: Negative for behavioral problems and confusion.        Personal History     Past Medical History:   Diagnosis Date   • Anxiety    • Arthralgia of hip 2/10/2016   • Arthritis    • Diabetes mellitus (CMS/[facility])    • Diabetic ketoacidosis without coma associated with type 1 diabetes mellitus (CMS/[facility]) 2017   • Esophageal candidiasis (CMS/[facility])    • Hyperlipidemia    • Hypertension    • Insomnia    • Pregnancy        • Thyroid disease      Past Surgical History:   Procedure Laterality Date   • TUBAL ABDOMINAL LIGATION       Family History   Adopted: Yes     Social History     Tobacco Use   • Smoking status: Former Smoker     Packs/day: 1.00     Years: 5.00     Pack years: 5.00     Types: Cigarettes     Last attempt to quit: 1990     Years since quittin.5   • Smokeless tobacco: Never Used   Substance Use Topics   • Alcohol use: Yes   • Drug use: No     No current facility-administered medications on file prior to encounter.      Current Outpatient Medications on File Prior to Encounter   Medication Sig Dispense Refill   • Blood Pressure Monitoring (5 SERIES BP MONITOR) device      • glucose blood test strip USE TO TEST BLOOD SUGAR 6 TIMES DAILY  ONE TOUCH ULTRA TEST STRIPS 600 each 3   • insulin aspart (novoLOG FLEXPEN) 100 UNIT/ML solution pen-injector sc pen Novolog 1 units for 7 gm for BF, 1 unit for 6 gm for lunch, 1 unit for 8 gm for dinner.max daily 50 units 15 pen 3   • ONETOUCH VERIO test strip USE TO TEST BLOOD SUGAR 6 TIMES DAILY 600 each 3   • atorvastatin (LIPITOR) 20 MG tablet TAKE ONE TABLET BY MOUTH DAILY 30 tablet 10   • desvenlafaxine  (PRISTIQ) 100 MG 24 hr tablet TAKE ONE TABLET BY MOUTH DAILY 30 tablet 10   • Ibandronate Sodium (BONIVA PO) Take  by mouth.     • insulin degludec (TRESIBA FLEXTOUCH) 100 UNIT/ML solution pen-injector injection Inject 24 Units under the skin into the appropriate area as directed Every Night. 10 pen 3   • losartan-hydrochlorothiazide (HYZAAR) 100-12.5 MG per tablet TAKE ONE TABLET BY MOUTH DAILY 90 tablet 1   • MILK THISTLE PO Take  by mouth Daily.     • triazolam (HALCION) 0.25 MG tablet Take 1.5 tablets by mouth At Night As Needed for Sleep. 45 tablet 5   • TURMERIC PO Take  by mouth.       Allergies   Allergen Reactions   • Ampicillin Diarrhea       Objective    Objective     Vital Signs  Temp:  [97.5 °F (36.4 °C)-97.6 °F (36.4 °C)] 97.5 °F (36.4 °C)  Heart Rate:  [74-82] 77  Resp:  [16-18] 16  BP: (124-166)/() 155/104  SpO2:  [97 %-99 %] 99 %  on   ;      Body mass index is 22.53 kg/m².    Physical Exam   Constitutional: She is oriented to person, place, and time. She appears well-developed and well-nourished. No distress.   HENT:   Head: Normocephalic.   Eyes: EOM are normal.   Neck: Normal range of motion. No JVD present.   Cardiovascular: Normal rate and regular rhythm.   Sinus rhythm on the monitor, no chest pain at this time, patient resting comfortably does not appear to be in any acute distress.   Pulmonary/Chest: Effort normal and breath sounds normal.   Patient on room air with sats 97%, lung sounds clear.   Abdominal: Soft. Bowel sounds are normal. She exhibits no distension. There is no tenderness.   Musculoskeletal: Normal range of motion.   Neurological: She is alert and oriented to person, place, and time. She has normal strength.   No focal neuro deficits   Skin: Skin is warm and dry. Capillary refill takes less than 2 seconds.   Psychiatric: She has a normal mood and affect. Her speech is normal and behavior is normal. Judgment and thought content normal. Cognition and memory are normal.      Nursing note and vitals reviewed.      Results Review:  I reviewed the patient's new clinical results.  I reviewed the patient's new imaging results and agree with the interpretation.  I reviewed the patient's other test results and agree with the interpretation  I personally viewed and interpreted the patient's EKG/Telemetry data  Discussed with ED provider.    Lab Results (last 24 hours)     Procedure Component Value Units Date/Time    CBC & Differential [[id number]] Collected:  07/13/20 2337    Specimen:  Blood Updated:  07/13/20 2358    Narrative:       The following orders were created for panel order CBC & Differential.  Procedure                               Abnormality         Status                     ---------                               -----------         ------                     CBC Auto Differential[[id number]]        Normal              Final result                 Please view results for these tests on the individual orders.    Comprehensive Metabolic Panel [[id number]]  (Abnormal) Collected:  07/13/20 2337    Specimen:  Blood Updated:  07/14/20 0002     Glucose 237 mg/dL      BUN 8 mg/dL      Creatinine 0.71 mg/dL      Sodium 133 mmol/L      Potassium 3.1 mmol/L      Chloride 95 mmol/L      CO2 27.9 mmol/L      Calcium 9.6 mg/dL      Total Protein 7.2 g/dL      Albumin 4.50 g/dL      ALT (SGPT) 12 U/L      AST (SGOT) 16 U/L      Alkaline Phosphatase 104 U/L      Total Bilirubin 0.4 mg/dL      eGFR Non African Amer 85 mL/min/1.73      Globulin 2.7 gm/dL      A/G Ratio 1.7 g/dL      BUN/Creatinine Ratio 11.3     Anion Gap 10.1 mmol/L     Narrative:       GFR Normal >60  Chronic Kidney Disease <60  Kidney Failure <15      Lipase [[id number]]  (Normal) Collected:  07/13/20 2337    Specimen:  Blood Updated:  07/14/20 0002     Lipase 41 U/L     Troponin [[id number]]  (Normal) Collected:  07/13/20 2337    Specimen:  Blood Updated:  07/14/20 0002     Troponin T <0.010 ng/mL     Narrative:       Troponin  T Reference Range:  <= 0.03 ng/mL-   Negative for AMI  >0.03 ng/mL-     Abnormal for myocardial necrosis.  Clinicians would have to utilize clinical acumen, EKG, Troponin and serial changes to determine if it is an Acute Myocardial Infarction or myocardial injury due to an underlying chronic condition.       Results may be falsely decreased if patient taking Biotin.      Magnesium [[id number]]  (Normal) Collected:  07/13/20 2337    Specimen:  Blood Updated:  07/14/20 0002     Magnesium 1.9 mg/dL     CBC Auto Differential [[id number]]  (Normal) Collected:  07/13/20 2337    Specimen:  Blood Updated:  07/13/20 2358     WBC 7.33 10*3/mm3      RBC 4.28 10*6/mm3      Hemoglobin 13.4 g/dL      Hematocrit 40.6 %      MCV 94.9 fL      MCH 31.3 pg      MCHC 33.0 g/dL      RDW 13.0 %      RDW-SD 45.0 fl      MPV 11.2 fL      Platelets 266 10*3/mm3      Neutrophil % 49.4 %      Lymphocyte % 41.7 %      Monocyte % 7.1 %      Eosinophil % 1.4 %      Basophil % 0.3 %      Immature Grans % 0.1 %      Neutrophils, Absolute 3.62 10*3/mm3      Lymphocytes, Absolute 3.06 10*3/mm3      Monocytes, Absolute 0.52 10*3/mm3      Eosinophils, Absolute 0.10 10*3/mm3      Basophils, Absolute 0.02 10*3/mm3      Immature Grans, Absolute 0.01 10*3/mm3      nRBC 0.0 /100 WBC     Urinalysis With Microscopic If Indicated (No Culture) - Urine, Clean Catch [[id number]]  (Abnormal) Collected:  07/14/20 0102    Specimen:  Urine, Clean Catch Updated:  07/14/20 0130     Color, UA Yellow     Appearance, UA Clear     pH, UA 6.5     Specific Gravity, UA 1.009     Glucose,  mg/dL (1+)     Ketones, UA Negative     Bilirubin, UA Negative     Blood, UA Trace     Protein, UA Negative     Leuk Esterase, UA Negative     Nitrite, UA Negative     Urobilinogen, UA 0.2 E.U./dL    Urinalysis, Microscopic Only - Urine, Clean Catch [[id number]] Collected:  07/14/20 0102    Specimen:  Urine, Clean Catch Updated:  07/14/20 0130     RBC, UA 0-2 /HPF      WBC, UA 0-2 /HPF       Bacteria, UA None Seen /HPF      Squamous Epithelial Cells, UA 0-2 /HPF      Hyaline Casts, UA None Seen /LPF      Methodology Automated Microscopy          Imaging Results (Last 24 Hours)     Procedure Component Value Units Date/Time    XR Chest 1 View [[id number]] Collected:  07/13/20 2345     Updated:  07/13/20 2349    Narrative:       PORTABLE CHEST RADIOGRAPH     HISTORY: Chest pressure     COMPARISON: None available.        FINDINGS:  Heart size is within normal limits. Lungs appear clear. No pneumothorax,  pleural effusion, or acute infiltrate is seen.       Impression:       No acute findings.     This report was finalized on 7/13/2020 11:46 PM by Dr. Chela Villarreal M.D.                  ECG 12 Lead   Preliminary Result   HEART RATE= 71  bpm   RR Interval= 848  ms   MS Interval= 195  ms   P Horizontal Axis= 3  deg   P Front Axis= 48  deg   QRSD Interval= 103  ms   QT Interval= 389  ms   QRS Axis= 37  deg   T Wave Axis= 28  deg   - NORMAL ECG -   Sinus rhythm   Electronically Signed By:    Date and Time of Study: 2020-07-13 21:33:33           Assessment/Plan     Active Hospital Problems    Diagnosis POA   • **Chest pain [R07.9] Yes   • Hypokalemia [E87.6] Unknown   • Anxiety [F41.9] Yes     failed Paxil, and several others SSRI, also Elavil, had stopped Cymbalta. 07/2018: Pristiq     • Mixed hyperlipidemia [E78.2] Yes   • Essential hypertension [I10] Yes   • Type 1 diabetes mellitus (CMS/HCC) [E10.9] Yes     Mrs. Ortega is a 55-year-old female with history of hypertension, anxiety depression, hyperlipidemia who presents to the emergency room with chest pain.      Chest pain/hypokalemia  -consult cardiology  -trend troponin  -replace potassium, potassium protocol  -telemetry unit for monitoring    Type I diabetes  -insulin per home protocol  -accu check ac/hs with SSI  -Last A1c 7.1 in February 2020    Anxiety/HTN/HLD  -continue home meds  -chronic conditions controlled at this time, although patient  states she has been trying to wean off.      · I discussed the patient's findings and my recommendations with patient and ED provider.    VTE Prophylaxis - SCDs.  Code Status - Full code.       FARIHA Barraza  [facility]  07/14/20  02:44

## 2020-07-14 NOTE — CONSULTS
Patient Name: Ivon Ortega  Age/Sex: 55 y.o. female  : 1965  MRN: [id number]    Date of Admission: 2020  Date of Encounter Visit: 20  Encounter Provider: Samir Vivas MD  Place of Service: [facility] CARDIOLOGY      Referring Provider: Shahriar Benjamin MD  Patient Care Team:  Les Soto MD as PCP - General (Family Medicine)  Shyam Zuniga MD as Consulting Physician (Endocrinology)    Subjective:   Consulted for:  Chest Pain    Chief Complaint: Chest Pain    History of Present Illness:  Ivon Ortega is a 55 y.o. female with history of DM, former smoker- quit about 4-6 months ago, anxiety, depression, HTN, and HLD who came to the ER yesterday for intermittent nausea and dizziness for the past couple of weeks and chest pain that started yesterday morning which didn't seem to get better. The sensation felt tight and did not radiate anywhere else.      She has been trying to wean herself off of her anxiety medication and antihypertensive medications because she didn't feel like the regimen was working. She was also hospitalized in January for DKA.     ER work up was relatively unremarkable - negative chest x ray. Blood pressures were high on admission which have now normalized.    On admission, K was low at 3.1- being replaced. Negative troponin x 3.     Patient feels that her blood pressure remains high.  She generally runs around 140/90.  She is concerned about that.  She still continuing to smoke and average about 6 cigarettes/day.    Past Medical History:  Past Medical History:   Diagnosis Date   • Anxiety    • Arthralgia of hip 2/10/2016   • Arthritis    • Diabetes mellitus (CMS/HCC)    • Diabetic ketoacidosis without coma associated with type 1 diabetes mellitus (CMS/HCC) 2017   • Esophageal candidiasis (CMS/HCC)    • Hyperlipidemia    • Hypertension    • Insomnia    • Pregnancy        • Thyroid disease        Past  Surgical History:   Procedure Laterality Date   • TUBAL ABDOMINAL LIGATION         Home Medications:   Medications Prior to Admission   Medication Sig Dispense Refill Last Dose   • Blood Pressure Monitoring (5 SERIES BP MONITOR) device    Past Week at Unknown time   • glucose blood test strip USE TO TEST BLOOD SUGAR 6 TIMES DAILY  ONE TOUCH ULTRA TEST STRIPS 600 each 3 7/14/2020 at Unknown time   • insulin aspart (novoLOG FLEXPEN) 100 UNIT/ML solution pen-injector sc pen Novolog 1 units for 7 gm for BF, 1 unit for 6 gm for lunch, 1 unit for 8 gm for dinner.max daily 50 units 15 pen 3 7/14/2020 at Unknown time   • ONETOUCH VERIO test strip USE TO TEST BLOOD SUGAR 6 TIMES DAILY 600 each 3 7/14/2020 at Unknown time   • atorvastatin (LIPITOR) 20 MG tablet TAKE ONE TABLET BY MOUTH DAILY 30 tablet 10 Taking   • desvenlafaxine (PRISTIQ) 100 MG 24 hr tablet TAKE ONE TABLET BY MOUTH DAILY 30 tablet 10 Taking   • Ibandronate Sodium (BONIVA PO) Take  by mouth.   Taking   • insulin degludec (TRESIBA FLEXTOUCH) 100 UNIT/ML solution pen-injector injection Inject 24 Units under the skin into the appropriate area as directed Every Night. 10 pen 3 Taking   • losartan-hydrochlorothiazide (HYZAAR) 100-12.5 MG per tablet TAKE ONE TABLET BY MOUTH DAILY 90 tablet 1    • MILK THISTLE PO Take  by mouth Daily.   Taking   • triazolam (HALCION) 0.25 MG tablet Take 1.5 tablets by mouth At Night As Needed for Sleep. 45 tablet 5 Taking   • TURMERIC PO Take  by mouth.   Taking       Allergies:  Allergies   Allergen Reactions   • Ampicillin Diarrhea       Past Social History:  Social History     Socioeconomic History   • Marital status:      Spouse name: Not on file   • Number of children: Not on file   • Years of education: Not on file   • Highest education level: Not on file   Occupational History   • Occupation:      Comment: full time   Tobacco Use   • Smoking status: Former Smoker     Packs/day: 1.00      Years: 5.00     Pack years: 5.00     Types: Cigarettes     Last attempt to quit:      Years since quittin.5   • Smokeless tobacco: Never Used   Substance and Sexual Activity   • Alcohol use: Yes   • Drug use: No   • Sexual activity: Defer        Past Family History:  Family History   Adopted: Yes       Review of Systems: All systems reviewed. Pertinent positives identified in HPI. All other systems are negative.     REVIEW OF SYSTEMS:   CONSTITUTIONAL: No weight loss, fever, chills, weakness or fatigue.   HEENT: Eyes: No visual loss, blurred vision, double vision or yellow sclerae. Ears, Nose, Throat: No hearing loss, sneezing, congestion, runny nose or sore throat.   SKIN: No rash or itching.     RESPIRATORY: No shortness of breath, hemoptysis, cough or sputum.   GASTROINTESTINAL: No anorexia, nausea, vomiting or diarrhea. No abdominal pain, bright red blood per rectum or melena.  GENITOURINARY: No burning on urination, hematuria or increased frequency.  NEUROLOGICAL: No headache, dizziness, syncope, paralysis, ataxia, numbness or tingling in the extremities. No change in bowel or bladder control.   MUSCULOSKELETAL: No muscle, back pain, joint pain or stiffness.   HEMATOLOGIC: No anemia, bleeding or bruising.   LYMPHATICS: No enlarged nodes. No history of splenectomy.   PSYCHIATRIC: No history of depression, anxiety, hallucinations.   ENDOCRINOLOGIC: No reports of sweating, cold or heat intolerance. No polyuria or polydipsia.       Objective:     Objective:  Temp:  [97.5 °F (36.4 °C)-98.2 °F (36.8 °C)] 98.2 °F (36.8 °C)  Heart Rate:  [74-91] 74  Resp:  [16-18] 16  BP: (112-166)/() 115/78  No intake or output data in the 24 hours ending 20 0800  Body mass index is 22.53 kg/m².      20  2328 20  0203   Weight: 61.7 kg (136 lb) 61.4 kg (135 lb 6.4 oz)           Physical Exam:   Physical Exam   Constitutional: She is oriented to person, place, and time. She appears well-developed and  well-nourished.   HENT:   Head: Normocephalic.   Eyes: Pupils are equal, round, and reactive to light.   Neck: Normal range of motion. No JVD present. Carotid bruit is not present. No thyromegaly present.   Cardiovascular: Normal rate, regular rhythm, S1 normal, S2 normal, normal heart sounds and intact distal pulses. Exam reveals no gallop and no friction rub.   No murmur heard.  Pulmonary/Chest: Effort normal and breath sounds normal.   Musculoskeletal: She exhibits no edema.   Neurological: She is alert and oriented to person, place, and time.   Skin: Skin is warm, dry and intact. No erythema.   Psychiatric: She has a normal mood and affect.   Vitals reviewed.        Lab Review:     Results from last 7 days   Lab Units 20  2337   SODIUM mmol/L 133*   POTASSIUM mmol/L 3.1*   CHLORIDE mmol/L 95*   CO2 mmol/L 27.9   BUN mg/dL 8   CREATININE mg/dL 0.71   GLUCOSE mg/dL 237*   CALCIUM mg/dL 9.6       Results from last 7 days   Lab Units 20  0407 20  0306 20  2337   TROPONIN T ng/mL <0.010 <0.010 <0.010     Results from last 7 days   Lab Units 20  2337   WBC 10*3/mm3 7.33   HEMOGLOBIN g/dL 13.4   HEMATOCRIT % 40.6   PLATELETS 10*3/mm3 266             Results from last 7 days   Lab Units 20  2337   MAGNESIUM mg/dL 1.9                 Results from last 7 days   Lab Units 20  0306   TSH uIU/mL 2.010       Imaging:      Imaging Results (Most Recent)     Procedure Component Value Units Date/Time    XR Chest 1 View [[id number]] Collected:  20     Updated:  20    Narrative:       PORTABLE CHEST RADIOGRAPH     HISTORY: Chest pressure     COMPARISON: None available.        FINDINGS:  Heart size is within normal limits. Lungs appear clear. No pneumothorax,  pleural effusion, or acute infiltrate is seen.       Impression:       No acute findings.     This report was finalized on 2020 11:46 PM by Dr. Chela Villarreal M.D.             EK20-      I  personally viewed and interpreted the patient's EKG/Telemetry data.    Assessment:   Assessment/Plan         Chest pain    Anxiety    Mixed hyperlipidemia    Essential hypertension    Type 1 diabetes mellitus (CMS/HCC)    Hypokalemia    1.  Chest pain: No evidence of an acute coronary syndrome.  I believe the patient needs a treadmill stress test because of her risk factors.  2.  Hypertension: Normal this morning.  Will monitor.  Might need adjustment in medication  3.  Diabetes mellitus, type I: On insulin  4.  Hypokalemia: Being replaced          Plan:      Treadmill stress test.    Portions of the above history entered by Latoya Davis RN have been validated by myself.    Thank you for allowing me to participate in the care of Ivon Ortega. Feel free to contact me directly with any further questions or concerns.    Samir Vivas MD  [facility]  07/14/20  07:59

## 2020-07-14 NOTE — ED NOTES
.  Nursing report ED to floor  Ivon Ortega  55 y.o.  female    HPI (triage note):   Chief Complaint   Patient presents with   • Chest Pain   • Hypertension   • Nausea       Admitting doctor:   Shahriar Benjamin MD    Admitting diagnosis:   The primary encounter diagnosis was Chest pain, unspecified type. Diagnoses of Hypertension, unspecified type, Hyperglycemia, History of diabetes mellitus, History of hypertension, and History of tobacco use were also pertinent to this visit.    Code status:   Current Code Status     Date Active Code Status Order ID Comments User Context       7/14/2020 0114 CPR [id number]  Vero Louise, APRN ED       Questions for Current Code Status     Question Answer Comment    Code Status CPR     Medical Interventions (Level of Support Prior to Arrest) Full           Allergies:   Ampicillin    Weight:       07/13/20 2328   Weight: 61.7 kg (136 lb)       Most recent vitals:   Vitals:    07/13/20 2304 07/13/20 2328 07/14/20 0040 07/14/20 0104   BP: (!) 156/101 (!) 166/101 152/100 124/82   BP Location: Left arm Left arm Left arm Left arm   Patient Position: Sitting Lying Lying Lying   Pulse: 79 77 76 74   Resp: 18 18 16 16   Temp:       TempSrc:       SpO2:  99% 97% 99%   Weight:  61.7 kg (136 lb)     Height:           Active LDAs/IV Access:   Lines, Drains & Airways    Active LDAs     Name:   Placement date:   Placement time:   Site:   Days:    Peripheral IV 07/13/20 2339 Right Antecubital   07/13/20 2339    Antecubital   less than 1                Labs (abnormal labs have a star):   Labs Reviewed   COMPREHENSIVE METABOLIC PANEL - Abnormal; Notable for the following components:       Result Value    Glucose 237 (*)     Sodium 133 (*)     Potassium 3.1 (*)     Chloride 95 (*)     All other components within normal limits    Narrative:     GFR Normal >60  Chronic Kidney Disease <60  Kidney Failure <15     LIPASE - Normal   TROPONIN (IN-HOUSE) - Normal    Narrative:     Troponin T  Reference Range:  <= 0.03 ng/mL-   Negative for AMI  >0.03 ng/mL-     Abnormal for myocardial necrosis.  Clinicians would have to utilize clinical acumen, EKG, Troponin and serial changes to determine if it is an Acute Myocardial Infarction or myocardial injury due to an underlying chronic condition.       Results may be falsely decreased if patient taking Biotin.     MAGNESIUM - Normal   CBC WITH AUTO DIFFERENTIAL - Normal   URINALYSIS W/ MICROSCOPIC IF INDICATED (NO CULTURE)   TROPONIN (IN-HOUSE)   TROPONIN (IN-HOUSE)   TSH   POCT GLUCOSE FINGERSTICK   POCT GLUCOSE FINGERSTICK   POCT GLUCOSE FINGERSTICK   POCT GLUCOSE FINGERSTICK   CBC AND DIFFERENTIAL    Narrative:     The following orders were created for panel order CBC & Differential.  Procedure                               Abnormality         Status                     ---------                               -----------         ------                     CBC Auto Differential[[id number]]        Normal              Final result                 Please view results for these tests on the individual orders.       EKG:   ECG 12 Lead   Preliminary Result   HEART RATE= 71  bpm   RR Interval= 848  ms   NE Interval= 195  ms   P Horizontal Axis= 3  deg   P Front Axis= 48  deg   QRSD Interval= 103  ms   QT Interval= 389  ms   QRS Axis= 37  deg   T Wave Axis= 28  deg   - NORMAL ECG -   Sinus rhythm   Electronically Signed By:    Date and Time of Study: 2020-07-13 21:33:33          Meds given in ED:   Medications   dextrose (GLUTOSE) oral gel 15 g (has no administration in time range)   dextrose (D50W) 25 g/ 50mL Intravenous Solution 25 g (has no administration in time range)   glucagon (human recombinant) (GLUCAGEN DIAGNOSTIC) injection 1 mg (has no administration in time range)   sodium chloride 0.9 % flush 10 mL (has no administration in time range)   sodium chloride 0.9 % flush 10 mL (has no administration in time range)   nitroglycerin (NITROSTAT) SL tablet 0.4 mg  (has no administration in time range)   sodium chloride 0.9 % infusion (has no administration in time range)   insulin lispro (humaLOG) injection 0-7 Units (has no administration in time range)   acetaminophen (TYLENOL) tablet 650 mg (has no administration in time range)     Or   acetaminophen (TYLENOL) 160 MG/5ML solution 650 mg (has no administration in time range)     Or   acetaminophen (TYLENOL) suppository 650 mg (has no administration in time range)   ondansetron (ZOFRAN) injection 4 mg (has no administration in time range)   potassium chloride (MICRO-K) CR capsule 40 mEq (has no administration in time range)     Or   potassium chloride (KLOR-CON) packet 40 mEq (has no administration in time range)     Or   potassium chloride 10 mEq in 100 mL IVPB (has no administration in time range)       Imaging results:  Xr Chest 1 View    Result Date: 2020  No acute findings.  This report was finalized on 2020 11:46 PM by Dr. Chela Villarreal M.D.        Ambulatory status:   - independent    Social issues:   Social History     Socioeconomic History   • Marital status:      Spouse name: Not on file   • Number of children: Not on file   • Years of education: Not on file   • Highest education level: Not on file   Occupational History   • Occupation:      Comment: full time   Tobacco Use   • Smoking status: Former Smoker     Packs/day: 1.00     Years: 5.00     Pack years: 5.00     Types: Cigarettes     Last attempt to quit: 1990     Years since quittin.5   • Smokeless tobacco: Never Used   Substance and Sexual Activity   • Alcohol use: Yes   • Drug use: No   • Sexual activity: Stormy Manzano RN  20 0121

## 2020-07-14 NOTE — DISCHARGE SUMMARY
LOS: 0 days     Name: Ivon Ortega  Age/Sex: 55 y.o. female  :  1965        PCP: Les Soto MD  Chief Complaint   Patient presents with   • Chest Pain   • Hypertension   • Nausea      Subjective   She is feeling better tolerated the stress test no further chest pain or symptoms  General: No Fever or Chills, Cardiac: No Chest Pain or Palpitations, Resp: No Cough or SOA, GI: No Nausea, Vomiting, or Diarrhea and Other: No bleeding      insulin aspart 2 Units Subcutaneous TID AC   insulin degludec 24 Units Subcutaneous Nightly   insulin lispro 0-7 Units Subcutaneous TID AC   sodium chloride 10 mL Intravenous Q12H       sodium chloride 100 mL/hr Last Rate: 100 mL/hr (20 0620)       Objective   Vital Signs  Temp:  [97.5 °F (36.4 °C)-98.2 °F (36.8 °C)] 97.8 °F (36.6 °C)  Heart Rate:  [73-91] 73  Resp:  [16-18] 16  BP: (112-166)/() 125/92  Body mass index is 22.53 kg/m².  No intake or output data in the 24 hours ending 20 1553    Physical Exam      Results Review:       I reviewed the patient's new clinical results.  Results from last 7 days   Lab Units 20  2337   WBC 10*3/mm3 7.33   HEMOGLOBIN g/dL 13.4   PLATELETS 10*3/mm3 266     Results from last 7 days   Lab Units 20  2337   SODIUM mmol/L 133*   POTASSIUM mmol/L 3.1*   CHLORIDE mmol/L 95*   CO2 mmol/L 27.9   BUN mg/dL 8   CREATININE mg/dL 0.71   CALCIUM mg/dL 9.6   MAGNESIUM mg/dL 1.9   Estimated Creatinine Clearance: 84.7 mL/min (by C-G formula based on SCr of 0.71 mg/dL).      Assessment/Plan     Chest pain    Anxiety    Mixed hyperlipidemia    Essential hypertension    Type 1 diabetes mellitus (CMS/HCC)    Hypokalemia      PLAN  This a 55-year-old lady with history of type 1 diabetes hypertension hyperlipidemia presents to the hospital with chest pain  -Stress test was negative cleared from a cardiac perspective for discharge home  -This could be related to esophageal spasm.  -Norvasc was ordered for blood  pressure control would be assisted with management of the esophageal spasm as well.  -Recommend close outpatient follow-up.  Check shows a PCP appointment tomorrow.  -Recommend close follow-up regarding her blood pressure and other comorbidities.      Disposition  Discharge home today      Sabino Mcdermott MD  [facility]  07/14/20  15:53

## 2020-07-14 NOTE — ED PROVIDER NOTES
EMERGENCY DEPARTMENT ENCOUNTER    Room Number:  21/21  Date of encounter:  7/14/2020  PCP: Les Soto MD  Historian: Patient      HPI:  Chief Complaint: Chest pain  A complete HPI/ROS/PMH/PSH/SH/FH are unobtainable due to: None    Context: Ivon Ortega is a 55 y.o. female who presents to the ED with concerns for chest discomfort that started around 7 AM this morning rating up into the upper chest and left neck area at times.  Denied any associated dizziness did have some slight lightheadedness, potentially some intermittent nausea.  No real significant shortness of breath.  Pain does not radiate to the back.  Pain seems to be worse with deep breathing.  Denies any recent fevers, chills, cough.  No recent vomiting or diarrhea.  Patient has taken 2 baby aspirin today.  Patient midst to being intermittently compliant with her anxiety and hypertensive medications over the last couple of weeks, stating that she is trying to wean herself off of them.  Patient states that she is adopted, does not know complete family history of coronary symptoms.  Admits to being smoker quit in the last 4 to 6 months.  No recent travel or recent surgeries.      MEDICAL RECORD REVIEW    No prior cardiac work-up noted in epic    PAST MEDICAL HISTORY  Active Ambulatory Problems     Diagnosis Date Noted   • Anxiety 02/10/2016   • Mixed hyperlipidemia 02/10/2016   • Essential hypertension 02/10/2016   • Insomnia 02/10/2016   • Type 1 diabetes mellitus (CMS/HCC) 02/10/2016   • Fatty infiltration of liver 08/25/2017   • Insulin pump status 08/25/2017   • Health care maintenance 07/19/2018   • Cervicalgia 07/19/2018   • Degenerative disc disease, cervical 07/23/2018   • Osteoporosis, postmenopausal 08/03/2018     Resolved Ambulatory Problems     Diagnosis Date Noted   • Sprain of hip 02/10/2016   • Arthralgia of hip 02/10/2016   • Fatigue due to exposure 02/10/2016   • Diabetic ketoacidosis without coma associated with type 1 diabetes  mellitus (CMS/HCC) 2017   • Hyperlipidemia      Past Medical History:   Diagnosis Date   • Arthritis    • Diabetes mellitus (CMS/HCC)    • Esophageal candidiasis (CMS/[facility])    • Hypertension    • Pregnancy    • Thyroid disease          PAST SURGICAL HISTORY  Past Surgical History:   Procedure Laterality Date   • TUBAL ABDOMINAL LIGATION           FAMILY HISTORY  Family History   Adopted: Yes         SOCIAL HISTORY  Social History     Socioeconomic History   • Marital status:      Spouse name: Not on file   • Number of children: Not on file   • Years of education: Not on file   • Highest education level: Not on file   Occupational History   • Occupation:      Comment: full time   Tobacco Use   • Smoking status: Former Smoker     Packs/day: 1.00     Years: 5.00     Pack years: 5.00     Types: Cigarettes     Last attempt to quit:      Years since quittin.5   • Smokeless tobacco: Never Used   Substance and Sexual Activity   • Alcohol use: Yes   • Drug use: No   • Sexual activity: Defer         ALLERGIES  Ampicillin        REVIEW OF SYSTEMS  Review of Systems     All systems reviewed and negative except for those discussed in HPI.       PHYSICAL EXAM    I have reviewed the triage vital signs and nursing notes.    ED Triage Vitals   Temp Heart Rate Resp BP SpO2   20 2304 20   97.6 °F (36.4 °C) 82 16 (!) 156/101 99 %      Temp src Heart Rate Source Patient Position BP Location FiO2 (%)   20 2304 20 2304 --   Tympanic Monitor Sitting Left arm        Physical Exam  General: Awake, alert, no acute distress, nontoxic, nondiaphoretic  HEENT: Mucous membranes moist, atraumatic, normocephalic, EOMI  Neck: Full ROM  Pulm: Symmetric, nonlabored, lungs CTAB  Cardiovascular: Regular rate and rhythm, normal S1/S2, intact distal pulses  GI: Soft, nontender, nondistended, no rebound, no  guarding, bowel sounds present  MSK: Full ROM, no deformity, unable to reproduce the discomfort with palpation to the chest wall  Skin: Warm, dry  Neuro: Alert and oriented x 3, GCS 15, moving all extremities, no focal deficits  Psych: Calm, cooperative      Surgical mask and gloves used during this encounter. Patient in surgical mask.      LAB RESULTS  Recent Results (from the past 24 hour(s))   Comprehensive Metabolic Panel    Collection Time: 07/13/20 11:37 PM   Result Value Ref Range    Glucose 237 (H) 65 - 99 mg/dL    BUN 8 6 - 20 mg/dL    Creatinine 0.71 0.57 - 1.00 mg/dL    Sodium 133 (L) 136 - 145 mmol/L    Potassium 3.1 (L) 3.5 - 5.2 mmol/L    Chloride 95 (L) 98 - 107 mmol/L    CO2 27.9 22.0 - 29.0 mmol/L    Calcium 9.6 8.6 - 10.5 mg/dL    Total Protein 7.2 6.0 - 8.5 g/dL    Albumin 4.50 3.50 - 5.20 g/dL    ALT (SGPT) 12 1 - 33 U/L    AST (SGOT) 16 1 - 32 U/L    Alkaline Phosphatase 104 39 - 117 U/L    Total Bilirubin 0.4 0.0 - 1.2 mg/dL    eGFR Non African Amer 85 >60 mL/min/1.73    Globulin 2.7 gm/dL    A/G Ratio 1.7 g/dL    BUN/Creatinine Ratio 11.3 7.0 - 25.0    Anion Gap 10.1 5.0 - 15.0 mmol/L   Lipase    Collection Time: 07/13/20 11:37 PM   Result Value Ref Range    Lipase 41 13 - 60 U/L   Troponin    Collection Time: 07/13/20 11:37 PM   Result Value Ref Range    Troponin T <0.010 0.000 - 0.030 ng/mL   Magnesium    Collection Time: 07/13/20 11:37 PM   Result Value Ref Range    Magnesium 1.9 1.6 - 2.6 mg/dL   CBC Auto Differential    Collection Time: 07/13/20 11:37 PM   Result Value Ref Range    WBC 7.33 3.40 - 10.80 10*3/mm3    RBC 4.28 3.77 - 5.28 10*6/mm3    Hemoglobin 13.4 12.0 - 15.9 g/dL    Hematocrit 40.6 34.0 - 46.6 %    MCV 94.9 79.0 - 97.0 fL    MCH 31.3 26.6 - 33.0 pg    MCHC 33.0 31.5 - 35.7 g/dL    RDW 13.0 12.3 - 15.4 %    RDW-SD 45.0 37.0 - 54.0 fl    MPV 11.2 6.0 - 12.0 fL    Platelets 266 140 - 450 10*3/mm3    Neutrophil % 49.4 42.7 - 76.0 %    Lymphocyte % 41.7 19.6 - 45.3 %     Monocyte % 7.1 5.0 - 12.0 %    Eosinophil % 1.4 0.3 - 6.2 %    Basophil % 0.3 0.0 - 1.5 %    Immature Grans % 0.1 0.0 - 0.5 %    Neutrophils, Absolute 3.62 1.70 - 7.00 10*3/mm3    Lymphocytes, Absolute 3.06 0.70 - 3.10 10*3/mm3    Monocytes, Absolute 0.52 0.10 - 0.90 10*3/mm3    Eosinophils, Absolute 0.10 0.00 - 0.40 10*3/mm3    Basophils, Absolute 0.02 0.00 - 0.20 10*3/mm3    Immature Grans, Absolute 0.01 0.00 - 0.05 10*3/mm3    nRBC 0.0 0.0 - 0.2 /100 WBC   Urinalysis With Microscopic If Indicated (No Culture) - Urine, Clean Catch    Collection Time: 07/14/20  1:02 AM   Result Value Ref Range    Color, UA Yellow Yellow, Straw    Appearance, UA Clear Clear    pH, UA 6.5 5.0 - 8.0    Specific Gravity, UA 1.009 1.005 - 1.030    Glucose,  mg/dL (1+) (A) Negative    Ketones, UA Negative Negative    Bilirubin, UA Negative Negative    Blood, UA Trace (A) Negative    Protein, UA Negative Negative    Leuk Esterase, UA Negative Negative    Nitrite, UA Negative Negative    Urobilinogen, UA 0.2 E.U./dL 0.2 - 1.0 E.U./dL   Urinalysis, Microscopic Only - Urine, Clean Catch    Collection Time: 07/14/20  1:02 AM   Result Value Ref Range    RBC, UA 0-2 None Seen, 0-2 /HPF    WBC, UA 0-2 None Seen, 0-2 /HPF    Bacteria, UA None Seen None Seen /HPF    Squamous Epithelial Cells, UA 0-2 None Seen, 0-2 /HPF    Hyaline Casts, UA None Seen None Seen /LPF    Methodology Automated Microscopy        Ordered the above labs and independently reviewed the results.        RADIOLOGY  Xr Chest 1 View    Result Date: 7/13/2020  PORTABLE CHEST RADIOGRAPH  HISTORY: Chest pressure  COMPARISON: None available.   FINDINGS: Heart size is within normal limits. Lungs appear clear. No pneumothorax, pleural effusion, or acute infiltrate is seen.      No acute findings.  This report was finalized on 7/13/2020 11:46 PM by Dr. Chela Villarreal M.D.        I ordered the above noted radiological studies. Reviewed by me.  See dictation for official  radiology interpretation.      PROCEDURES    Procedures  EKG    EKG Time: 2133  Rhythm/Rate: Sinus rhythm with rate of 71  Normal axis  Normal intervals  No acute ischemic changes  No STEMI     Interpreted Contemporaneously by me, independently viewed  No prior for comparison      MEDICATIONS GIVEN IN ER    Medications   dextrose (GLUTOSE) oral gel 15 g (has no administration in time range)   dextrose (D50W) 25 g/ 50mL Intravenous Solution 25 g (has no administration in time range)   glucagon (human recombinant) (GLUCAGEN DIAGNOSTIC) injection 1 mg (has no administration in time range)   sodium chloride 0.9 % flush 10 mL (has no administration in time range)   sodium chloride 0.9 % flush 10 mL (has no administration in time range)   nitroglycerin (NITROSTAT) SL tablet 0.4 mg (has no administration in time range)   sodium chloride 0.9 % infusion (has no administration in time range)   insulin lispro (humaLOG) injection 0-7 Units (has no administration in time range)   acetaminophen (TYLENOL) tablet 650 mg (has no administration in time range)     Or   acetaminophen (TYLENOL) 160 MG/5ML solution 650 mg (has no administration in time range)     Or   acetaminophen (TYLENOL) suppository 650 mg (has no administration in time range)   ondansetron (ZOFRAN) injection 4 mg (has no administration in time range)   potassium chloride (MICRO-K) CR capsule 40 mEq (has no administration in time range)     Or   potassium chloride (KLOR-CON) packet 40 mEq (has no administration in time range)     Or   potassium chloride 10 mEq in 100 mL IVPB (has no administration in time range)         PROGRESS, DATA ANALYSIS, CONSULTS, AND MEDICAL DECISION MAKING    All labs have been independently reviewed by me.  All radiology studies have been reviewed by me and discussed with radiologist dictating the report.   EKG's independently viewed and interpreted by me.  Discussion below represents my analysis of pertinent findings related to patient's  condition, differential diagnosis, treatment plan and final disposition.        ED Course as of Jul 14 0143   Tue Jul 14, 2020 0043 Discussed findings today with the patient, notified her that she is in the moderate risk category given her recent tobacco use status, diabetes, hypertension, and ongoing chest discomfort.  Plan to hospitalize her today.    [DC]   0044 HEART score 4    [DC]   0104 Discussed patient's clinical course and findings with FARIHA Hansen, [facility], discussed her moderate risk status, ongoing mild discomfort, and comorbidities.  Discussed the need for cardiology evaluation in the morning.  Telemetry bed placement    [DC]      ED Course User Index  [DC] Narinder Green MD       AS OF 01:43 VITALS:    BP - 124/82  HR - 74  TEMP - 97.6 °F (36.4 °C) (Tympanic)  02 SATS - 99%        DIAGNOSIS  Final diagnoses:   Chest pain, unspecified type   Hypertension, unspecified type   Hyperglycemia   History of diabetes mellitus   History of hypertension   History of tobacco use         DISPOSITION  ADMISSION    Discussed treatment plan and reason for admission with pt/family and admitting physician.  Pt/family voiced understanding of the plan for admission for further testing/treatment as needed.                  Narinder Green MD  07/14/20 0143

## 2020-07-14 NOTE — ED NOTES
"Pt states she does take blood pressure meds for HTN, but was trying to \"wean\" herself off them, so she doesn't take them daily. She also said she is diabetic & takes insulin, but is compliant with that due to a hx of DKA. Pt has a friend at bedside & both are in mask,  This RN in mask, gloves, & shield during care.      Stormy Painting, RN  07/13/20 7106    "

## 2020-07-14 NOTE — NURSING NOTE
"Patient refuses to use our SS insulin and equipment to check her BG. She says \"I brought all that stuff with me\" to avoid being charged.   "

## 2020-07-15 ENCOUNTER — TRANSITIONAL CARE MANAGEMENT TELEPHONE ENCOUNTER (OUTPATIENT)
Dept: CALL CENTER | Facility: HOSPITAL | Age: 55
End: 2020-07-15

## 2020-07-15 ENCOUNTER — OFFICE VISIT (OUTPATIENT)
Dept: INTERNAL MEDICINE | Facility: CLINIC | Age: 55
End: 2020-07-15

## 2020-07-15 VITALS
HEART RATE: 78 BPM | BODY MASS INDEX: 22.99 KG/M2 | OXYGEN SATURATION: 98 % | WEIGHT: 138 LBS | DIASTOLIC BLOOD PRESSURE: 78 MMHG | SYSTOLIC BLOOD PRESSURE: 124 MMHG | HEIGHT: 65 IN

## 2020-07-15 DIAGNOSIS — I10 ESSENTIAL HYPERTENSION: Primary | ICD-10-CM

## 2020-07-15 DIAGNOSIS — F41.9 ANXIETY: ICD-10-CM

## 2020-07-15 DIAGNOSIS — E10.65 TYPE 1 DIABETES MELLITUS WITH HYPERGLYCEMIA (HCC): ICD-10-CM

## 2020-07-15 PROCEDURE — 99213 OFFICE O/P EST LOW 20 MIN: CPT | Performed by: NURSE PRACTITIONER

## 2020-07-15 NOTE — OUTREACH NOTE
Prep Survey      Responses   [facility] patient discharged from?  [place]   Is LACE score < 7 ?  Yes   Eligibility  [facility]   Date of Admission  07/13/20   Date of Discharge  07/14/20   Discharge Disposition  Home or Self Care   Discharge diagnosis  Chest Pain   COVID-19 Test Status  Not tested   Does the patient have one of the following disease processes/diagnoses(primary or secondary)?  Other   Does the patient have Home health ordered?  No   Is there a DME ordered?  No   Prep survey completed?  Yes          Lizz Sr RN

## 2020-07-15 NOTE — PROGRESS NOTES
"           Name: Ivon Ortega  :  1965    Subjective:      Chief Complaint   Patient presents with   • Elevated Blood Pressure        Ivon Ortega is a 55 y.o. female patient.  She has multiple chronic medical conditions including: hypertension, DM I      Prior [facility] patient. Due to establish with Dr Soto. She is new to me.    She states that her work recently provided her with a blood pressure monitoring cuff for home.  She states over the last few weeks she been \"weaning\" herself off of her blood pressure medication and antidepressant.  She states she does not know why she thought to do this she just thought that she could control her blood pressure better with diet and exercise.    She presented to [facility] ER with chest pain on  with elevated blood pressure and chest discomfort.  She states she quit smoking over the last 6 months.  She was in the hospital until .  She had a cardiac work-up, stress test was done with no evidence of myocardial ischemia.  She was discharged home with addition of Norvasc 5 mg.  She states when she went to the drive-through pharmacy on her way home they stated they were out of it for come back today.  She has not picked it up today but plans to do so this evening.      She denies any gerd symptoms. States her blood sugar is controlled.  DM I dx when she was in her 40's - states initially told she was DMII.     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  States this is a very stressful job and she used to do yoga.  But now she comes home and doesn't do much in the evening.   Gets up at 2:15 am to get to work at 4 am.  States she has to prepare herself for going to work and will drink coffee.       Hypertension   This is a chronic problem. The current episode started more than 1 year ago. The problem has been gradually worsening since onset. The problem is controlled. Associated symptoms include anxiety. Pertinent negatives include no chest pain, " headaches, neck pain, palpitations, peripheral edema or shortness of breath. Associated agents: coffee. Risk factors for coronary artery disease include smoking/tobacco exposure, stress, sedentary lifestyle and diabetes mellitus. Past treatments include angiotensin blockers and diuretics. Current antihypertension treatment includes angiotensin blockers, calcium channel blockers and diuretics. The current treatment provides significant improvement. Compliance problems include exercise.  There is no history of CAD/MI or CVA.     Current outpatient and discharge medications have been reconciled for the patient.  Reviewed by: JANET Bee III    I have reviewed the patient's medical history in detail and updated the computerized patient record.    Past Medical History:   Diagnosis Date   • Anxiety    • Arthralgia of hip 2/10/2016   • Arthritis    • Diabetes mellitus (CMS/HCC)    • Diabetic ketoacidosis without coma associated with type 1 diabetes mellitus (CMS/HCC) 2017   • Esophageal candidiasis (CMS/HCC)    • Hyperlipidemia    • Hypertension    • Insomnia    • Pregnancy        • Thyroid disease        Past Surgical History:   Procedure Laterality Date   • TUBAL ABDOMINAL LIGATION         Family History   Adopted: Yes       Social History     Socioeconomic History   • Marital status:      Spouse name: Not on file   • Number of children: Not on file   • Years of education: Not on file   • Highest education level: Not on file   Occupational History   • Occupation:      Comment: full time   Tobacco Use   • Smoking status: Former Smoker     Packs/day: 1.00     Years: 5.00     Pack years: 5.00     Types: Cigarettes     Last attempt to quit: 1990     Years since quittin.5   • Smokeless tobacco: Never Used   Substance and Sexual Activity   • Alcohol use: Yes   • Drug use: No   • Sexual activity: Defer       Most Recent Immunizations    "  Administered Date(s) Administered   • Influenza TIV (IM) 12/01/2015   • Pneumococcal Polysaccharide (PPSV23) 07/19/2018   • flucelvax quad pfs =>4 YRS 10/02/2018         Review of Systems:   Review of Systems   Constitutional: Negative for chills, fever and unexpected weight change.   Respiratory: Negative for cough and shortness of breath.    Cardiovascular: Negative for chest pain and palpitations.   Gastrointestinal: Negative for abdominal distention and abdominal pain.   Musculoskeletal: Negative for neck pain.   Neurological: Negative for headaches.   Psychiatric/Behavioral: The patient is nervous/anxious.          Objective:      Physical Exam:   Physical Exam   Constitutional: She is oriented to person, place, and time. She appears well-developed. She is cooperative.   HENT:   Head: Normocephalic and atraumatic.   Eyes: Pupils are equal, round, and reactive to light. Conjunctivae are normal.   Neck: Normal range of motion. Neck supple. No thyromegaly present.   Cardiovascular: Normal rate, regular rhythm, normal heart sounds, intact distal pulses and normal pulses.   No murmur heard.  Pulmonary/Chest: Effort normal and breath sounds normal. She exhibits no deformity.   Equal, Unlabored   Abdominal: Soft. Bowel sounds are normal.   Musculoskeletal: Normal range of motion. She exhibits no edema.   Lymphadenopathy:     She has no cervical adenopathy.   Neurological: She is alert and oriented to person, place, and time.   Skin: Skin is warm and dry. Capillary refill takes 2 to 3 seconds.   Psychiatric: She has a normal mood and affect. Her behavior is normal. Judgment and thought content normal.   Vitals reviewed.        Vital Signs:  Vitals:    07/15/20 1359 07/15/20 1416   BP: 118/82 124/78   BP Location: Left arm    Patient Position: Sitting    Cuff Size: Adult    Pulse: 78    SpO2: 98%    Weight: 62.6 kg (138 lb)    Height: 165.1 cm (65\")      Body mass index is 22.96 kg/m².      Results Review:  "     REVIEWED AND DISCUSSED LAB RESULTS WITH PATIENT      Requested Prescriptions      No prescriptions requested or ordered in this encounter     Routine medications provided by this office will also be refilled via pharmacy request.       Current Outpatient Medications:   •  atorvastatin (LIPITOR) 20 MG tablet, TAKE ONE TABLET BY MOUTH DAILY, Disp: 30 tablet, Rfl: 10  •  Blood Pressure Monitoring (5 SERIES BP MONITOR) device, , Disp: , Rfl:   •  desvenlafaxine (PRISTIQ) 100 MG 24 hr tablet, TAKE ONE TABLET BY MOUTH DAILY, Disp: 30 tablet, Rfl: 10  •  glucose blood test strip, USE TO TEST BLOOD SUGAR 6 TIMES DAILY  ONE TOUCH ULTRA TEST STRIPS, Disp: 600 each, Rfl: 3  •  Ibandronate Sodium (BONIVA PO), Take  by mouth., Disp: , Rfl:   •  insulin aspart (novoLOG FLEXPEN) 100 UNIT/ML solution pen-injector sc pen, Novolog 1 units for 7 gm for BF, 1 unit for 6 gm for lunch, 1 unit for 8 gm for dinner.max daily 50 units, Disp: 15 pen, Rfl: 3  •  insulin degludec (TRESIBA FLEXTOUCH) 100 UNIT/ML solution pen-injector injection, Inject 24 Units under the skin into the appropriate area as directed Every Night., Disp: 10 pen, Rfl: 3  •  losartan-hydrochlorothiazide (HYZAAR) 100-12.5 MG per tablet, TAKE ONE TABLET BY MOUTH DAILY, Disp: 90 tablet, Rfl: 1  •  MILK THISTLE PO, Take  by mouth Daily., Disp: , Rfl:   •  ONETOUCH VERIO test strip, USE TO TEST BLOOD SUGAR 6 TIMES DAILY, Disp: 600 each, Rfl: 3  •  triazolam (HALCION) 0.25 MG tablet, Take 1.5 tablets by mouth At Night As Needed for Sleep., Disp: 45 tablet, Rfl: 5  •  TURMERIC PO, Take  by mouth., Disp: , Rfl:   •  amLODIPine (NORVASC) 5 MG tablet, Take 1 tablet by mouth Daily., Disp: 30 tablet, Rfl: 0  No current facility-administered medications for this visit.        Assessment and Plan:        Problem List Items Addressed This Visit        Cardiovascular and Mediastinum    Essential hypertension - Primary     Hypertension is improving with treatment.  Continue current  "treatment regimen.  Dietary sodium restriction.  Regular aerobic exercise.  Blood pressure will be reassessed at the next regular appointment.    Discussed significance of medication compliance.   She will start norvasc today.   Decrease caffeine intake.   FU if worsens or does not improve.             Endocrine    Type 1 diabetes mellitus (CMS/HCC)     Diabetes is stable on current rx - fu with endocrine            Other    Anxiety     Continue current rx daily   Resume yoga and daily walks                   Discussed any change in Rx and discussed visit with patient.  All questions answered.      Return if symptoms worsen or fail to improve.    Maxwell \"Karan\" Elsie, APRN   07/15/20    Dragon disclaimer:   Much of this encounter note is an electronic transcription/translation of spoken language to printed text. The electronic translation of spoken language may permit erroneous, or at times, nonsensical words or phrases to be inadvertently transcribed; Although I have reviewed the note for such errors, some may still exist.     Additional Patient Counseling:       There are no Patient Instructions on file for this visit.  "

## 2020-07-15 NOTE — ASSESSMENT & PLAN NOTE
Hypertension is improving with treatment.  Continue current treatment regimen.  Dietary sodium restriction.  Regular aerobic exercise.  Blood pressure will be reassessed at the next regular appointment.    Discussed significance of medication compliance.   She will start norvasc today.   Decrease caffeine intake.   FU if worsens or does not improve.

## 2020-07-15 NOTE — OUTREACH NOTE
Call Center TCM Note      Responses   [facility] patient discharged from?  [facility]   COVID-19 Test Status  Not tested   Does the patient have one of the following disease processes/diagnoses(primary or secondary)?  Other   TCM attempt successful?  Yes   Call start time  1338   Call end time  1344   Is patient permission given to speak with other caregiver?  No   Meds reviewed with patient/caregiver?  Yes   Is the patient having any side effects they believe may be caused by any medication additions or changes?  No   Does the patient have all medications ordered at discharge?  No [Pharmacy did not have amlodipine ready for . Patient reports that she will  this afternoon. ]   Nursing Interventions  No intervention needed   Medication comments  Patient will continue to monitor home blood pressure with changes in medication.    Does the patient have a primary care provider?   Yes   Does the patient have an appointment with their PCP within 7 days of discharge?  Yes   Comments regarding PCP  Patient has new patient appt for Dr Soto for 9/1/20. Patient seeing Karan Zimmerman today at 145pm. for office appt previously scheduled.    Has the patient kept scheduled appointments due by today?  N/A   Has home health visited the patient within 72 hours of discharge?  N/A   Pulse Ox monitoring  None   Psychosocial issues?  No   Did the patient receive a copy of their discharge instructions?  Yes   Nursing interventions  Reviewed instructions with patient   What is the patient's perception of their health status since discharge?  Same   Is the patient/caregiver able to teach back signs and symptoms related to disease process for when to call PCP?  Yes   Is the patient/caregiver able to teach back signs and symptoms related to disease process for when to call 911?  Yes   Is the patient/caregiver able to teach back the hierarchy of who to call/visit for symptoms/problems? PCP, Specialist, Home health nurse,  Urgent Care, ED, 911  Yes   TCM call completed?  Yes          Kim Matute, RN    7/15/2020, 13:44

## 2020-07-18 LAB
25(OH)D3+25(OH)D2 SERPL-MCNC: 36 NG/ML (ref 30–100)
ALBUMIN/CREAT UR: <5 MG/G CREAT (ref 0–29)
BUN SERPL-MCNC: 10 MG/DL (ref 6–20)
BUN/CREAT SERPL: 13 (ref 7–25)
CALCIUM SERPL-MCNC: 9.5 MG/DL (ref 8.6–10.5)
CHLORIDE SERPL-SCNC: 94 MMOL/L (ref 98–107)
CHOLEST SERPL-MCNC: 229 MG/DL (ref 0–200)
CO2 SERPL-SCNC: 26.4 MMOL/L (ref 22–29)
CREAT SERPL-MCNC: 0.77 MG/DL (ref 0.57–1)
CREAT UR-MCNC: 56.5 MG/DL
FOLATE SERPL-MCNC: 8.66 NG/ML (ref 4.78–24.2)
GLUCOSE SERPL-MCNC: 188 MG/DL (ref 65–99)
HBA1C MFR BLD: 7.3 % (ref 4.8–5.6)
HDLC SERPL-MCNC: 66 MG/DL (ref 40–60)
INTERPRETATION: NORMAL
LDLC SERPL CALC-MCNC: 145 MG/DL (ref 0–100)
Lab: NORMAL
MICROALBUMIN UR-MCNC: <3 UG/ML
POTASSIUM SERPL-SCNC: 4.3 MMOL/L (ref 3.5–5.2)
SODIUM SERPL-SCNC: 130 MMOL/L (ref 136–145)
T4 FREE SERPL-MCNC: 1.04 NG/DL (ref 0.93–1.7)
TRIGL SERPL-MCNC: 90 MG/DL (ref 0–150)
TSH SERPL DL<=0.005 MIU/L-ACNC: 3.38 UIU/ML (ref 0.27–4.2)
VIT B12 SERPL-MCNC: 340 PG/ML (ref 211–946)
VLDLC SERPL CALC-MCNC: 18 MG/DL

## 2020-08-03 ENCOUNTER — OFFICE VISIT (OUTPATIENT)
Dept: ENDOCRINOLOGY | Age: 55
End: 2020-08-03

## 2020-08-03 VITALS
HEART RATE: 84 BPM | RESPIRATION RATE: 20 BRPM | DIASTOLIC BLOOD PRESSURE: 84 MMHG | BODY MASS INDEX: 23.43 KG/M2 | SYSTOLIC BLOOD PRESSURE: 122 MMHG | WEIGHT: 140.6 LBS | HEIGHT: 65 IN | OXYGEN SATURATION: 96 %

## 2020-08-03 DIAGNOSIS — Z79.4 LONG-TERM INSULIN USE (HCC): ICD-10-CM

## 2020-08-03 DIAGNOSIS — E78.2 MIXED HYPERLIPIDEMIA: ICD-10-CM

## 2020-08-03 DIAGNOSIS — E10.43 TYPE 1 DIABETES MELLITUS WITH DIABETIC AUTONOMIC NEUROPATHY (HCC): Primary | ICD-10-CM

## 2020-08-03 PROCEDURE — 99214 OFFICE O/P EST MOD 30 MIN: CPT | Performed by: INTERNAL MEDICINE

## 2020-09-01 ENCOUNTER — OFFICE VISIT (OUTPATIENT)
Dept: INTERNAL MEDICINE | Facility: CLINIC | Age: 55
End: 2020-09-01

## 2020-09-01 VITALS
DIASTOLIC BLOOD PRESSURE: 84 MMHG | SYSTOLIC BLOOD PRESSURE: 138 MMHG | BODY MASS INDEX: 22.82 KG/M2 | HEIGHT: 65 IN | HEART RATE: 84 BPM | OXYGEN SATURATION: 99 % | WEIGHT: 137 LBS

## 2020-09-01 DIAGNOSIS — I10 ESSENTIAL HYPERTENSION: ICD-10-CM

## 2020-09-01 PROCEDURE — 99214 OFFICE O/P EST MOD 30 MIN: CPT | Performed by: FAMILY MEDICINE

## 2020-09-01 RX ORDER — LOSARTAN POTASSIUM AND HYDROCHLOROTHIAZIDE 25; 100 MG/1; MG/1
1 TABLET ORAL DAILY
Qty: 90 TABLET | Refills: 3 | Status: SHIPPED | OUTPATIENT
Start: 2020-09-01 | End: 2021-08-26

## 2020-09-01 NOTE — PROGRESS NOTES
Subjective   Ivon Ortega is a 55 y.o. female. Establish care and hypertension    History of Present Illness   New patient to me    Hypertension - a little high running around 140/90 at home.  Her blood pressure cuff is accurate against ours..  Patient taking medication as prescribed.  Denies chest pain, shortness of breath, headache, lower extremity edema.  Patient is taking losartan-hctz 100-12.5. Ran out of Norvasc 2 weeks ago.      The following portions of the patient's history were reviewed and updated as appropriate: allergies, current medications, past family history, past medical history, past social history, past surgical history and problem list.    Review of Systems   Constitutional: Negative for fatigue and fever.   Respiratory: Negative for shortness of breath and wheezing.    Cardiovascular: Negative for chest pain and palpitations.   Gastrointestinal: Negative for constipation and nausea.       Objective   Physical Exam   Constitutional: She appears well-developed and well-nourished. No distress.   Cardiovascular: Normal rate, regular rhythm and normal heart sounds. Exam reveals no gallop and no friction rub.   No murmur heard.  Pulmonary/Chest: Effort normal and breath sounds normal. She has no wheezes. She has no rales.   Skin: Skin is warm. Capillary refill takes less than 2 seconds. She is not diaphoretic.   Nursing note and vitals reviewed.      Assessment/Plan   Ivon was seen today for [facility].    Diagnoses and all orders for this visit:    Essential hypertension  -     losartan-hydrochlorothiazide (Hyzaar) 100-25 MG per tablet; Take 1 tablet by mouth Daily.  -     Comprehensive Metabolic Panel; Future      Will increase her medication to 100-25 this time.  Plan to see her back in about 3 months for a physical which I have ordered a lab for.  I did not see that she was also on Halcion from Dr. Sousa previously at this clinic.  Next time she comes in I will get her officially set  up for that with me.

## 2020-09-04 ENCOUNTER — TELEPHONE (OUTPATIENT)
Dept: INTERNAL MEDICINE | Facility: CLINIC | Age: 55
End: 2020-09-04

## 2020-09-04 DIAGNOSIS — F51.04 PSYCHOPHYSIOLOGICAL INSOMNIA: ICD-10-CM

## 2020-09-04 NOTE — TELEPHONE ENCOUNTER
PATIENT CALLED IN AND STATED SHE NEEDS A REFILL OF triazolam (HALCION) 0.25 MG tablet  TAKE 1 AND A 1/2  TABLETS AT BEDTIME       SENT TO  [facility] [facility] - [phone]  - [phone]   [phone]          PATIENT CALL BACK [phone]   PATIENT  REQUESTS A CALL BACK WHEN SENT TO PHARMACY

## 2020-09-22 DIAGNOSIS — F41.9 ANXIETY: ICD-10-CM

## 2020-09-22 RX ORDER — DESVENLAFAXINE 100 MG/1
100 TABLET, EXTENDED RELEASE ORAL DAILY
Qty: 30 TABLET | Refills: 10 | Status: SHIPPED | OUTPATIENT
Start: 2020-09-22 | End: 2021-08-23

## 2020-09-22 NOTE — TELEPHONE ENCOUNTER
Caller: Ivon Ortega    Relationship: Self    Best call back number: [phone]    Medication needed:   Requested Prescriptions     Pending Prescriptions Disp Refills   • desvenlafaxine (PRISTIQ) 100 MG 24 hr tablet 30 tablet 10     Sig: Take 1 tablet by mouth Daily.       When do you need the refill by: 9/22/2020    What details did the patient provide when requesting the medication: Patient has 3 pills left and is leaving town on Thursday. The pharmacy states she had no refills left and to call the office.    Does the patient have less than a 3 day supply:  [x] Yes  [] No    What is the patient's preferred pharmacy: [facility] [place] [address] AT [address] [facility] - [phone] [facility] [phone] FX

## 2020-10-22 ENCOUNTER — TELEPHONE (OUTPATIENT)
Dept: INTERNAL MEDICINE | Facility: CLINIC | Age: 55
End: 2020-10-22

## 2020-11-01 ENCOUNTER — RESULTS ENCOUNTER (OUTPATIENT)
Dept: ENDOCRINOLOGY | Age: 55
End: 2020-11-01

## 2020-11-01 DIAGNOSIS — E78.2 MIXED HYPERLIPIDEMIA: ICD-10-CM

## 2020-11-01 DIAGNOSIS — E10.43 TYPE 1 DIABETES MELLITUS WITH DIABETIC AUTONOMIC NEUROPATHY (HCC): ICD-10-CM

## 2020-11-09 ENCOUNTER — TELEPHONE (OUTPATIENT)
Dept: INTERNAL MEDICINE | Facility: CLINIC | Age: 55
End: 2020-11-09

## 2020-11-09 DIAGNOSIS — F51.04 PSYCHOPHYSIOLOGICAL INSOMNIA: ICD-10-CM

## 2020-11-10 NOTE — TELEPHONE ENCOUNTER
I called and spoke with Cherrie at [facility] and they will have it ready for the pt in an hour.  Pt advised

## 2020-11-10 NOTE — TELEPHONE ENCOUNTER
Patient called in and stated she spoke with Taye at [facility] and they don't have the prior authorization for triazolam (HALCION) 0.25 MG tablet . Patient also mailed the letter that she received from her insurance to us  that she will need the prior authorization sometime last week  .       Please send to [facility] [address] [place] [address] - [address] - [phone]  - [phone]   [phone]        Patient call back [phone]

## 2020-12-01 ENCOUNTER — RESULTS ENCOUNTER (OUTPATIENT)
Dept: INTERNAL MEDICINE | Facility: CLINIC | Age: 55
End: 2020-12-01

## 2020-12-01 DIAGNOSIS — I10 ESSENTIAL HYPERTENSION: ICD-10-CM

## 2021-01-04 ENCOUNTER — OFFICE VISIT (OUTPATIENT)
Dept: ENDOCRINOLOGY | Age: 56
End: 2021-01-04

## 2021-01-04 VITALS
WEIGHT: 140.4 LBS | HEIGHT: 65 IN | HEART RATE: 81 BPM | BODY MASS INDEX: 23.39 KG/M2 | DIASTOLIC BLOOD PRESSURE: 70 MMHG | SYSTOLIC BLOOD PRESSURE: 112 MMHG | OXYGEN SATURATION: 99 %

## 2021-01-04 DIAGNOSIS — E78.2 HYPERLIPEMIA, MIXED: ICD-10-CM

## 2021-01-04 DIAGNOSIS — IMO0002 DM (DIABETES MELLITUS), TYPE 1, UNCONTROLLED W/NEUROLOGIC COMPLICATION: ICD-10-CM

## 2021-01-04 DIAGNOSIS — Z79.4 LONG-TERM INSULIN USE (HCC): Primary | ICD-10-CM

## 2021-01-04 PROCEDURE — 99214 OFFICE O/P EST MOD 30 MIN: CPT | Performed by: INTERNAL MEDICINE

## 2021-01-04 RX ORDER — ATORVASTATIN CALCIUM 20 MG/1
20 TABLET, FILM COATED ORAL DAILY
Qty: 90 TABLET | Refills: 3 | Status: SHIPPED | OUTPATIENT
Start: 2021-01-04 | End: 2022-01-26

## 2021-01-04 RX ORDER — FLURBIPROFEN SODIUM 0.3 MG/ML
SOLUTION/ DROPS OPHTHALMIC
Qty: 400 EACH | Refills: 3 | Status: SHIPPED | OUTPATIENT
Start: 2021-01-04

## 2021-01-04 RX ORDER — ATORVASTATIN CALCIUM 20 MG/1
20 TABLET, FILM COATED ORAL DAILY
COMMUNITY
End: 2021-01-04 | Stop reason: SDUPTHER

## 2021-01-04 NOTE — PROGRESS NOTES
55 y.o.    Patient Care Team:  Les Soto MD as PCP - General (Family Medicine)  Shyam Zuniga MD as Consulting Physician (Endocrinology)    Chief Complaint:    FOLLOW UP / TYPE 1 DM   Subjective     HPI    55-year-old white female is here for the follow-up of type 1 diabetes mellitus.    Type 1 diabetes mellitus-diagnosed approximately 12 - 15 years ago.  She is currently on Tresiba and NovoLog.  Tresiba 14-15 units daily in the morning, NovoLog 1 unit for 5 g of carbs with each meal, correction-1 unit for 50/140.  Blood sugars have been extremely variable.  Lowest blood sugar was 36 mg/dL, does have decreased hypoglycemic awareness  Highest blood sugar was 500.  She checks her blood sugars 4-7 times a day.  She used to be on Omni pod and Dexcom but she did not like the settings in the experience on the Dexcom where she still had to prick herself 4-7 times a day.  No known history of diabetic retinopathy.  Does complain of diabetic neuropathy.  She is physically active.  Tries to follow diabetic diet as much as she can.  Last DKA episode was in 2017.    HLP   On liptor 20 mg po daily.     Reviewed primary care physician's/consulting physician documentation and lab results       Interval History      The following portions of the patient's history were reviewed and updated as appropriate: allergies, current medications, past family history, past medical history, past social history, past surgical history and problem list.    Past Medical History:   Diagnosis Date   • Anxiety    • Arthralgia of hip 2/10/2016   • Arthritis    • Diabetes mellitus (CMS/HCC)    • Diabetic ketoacidosis without coma associated with type 1 diabetes mellitus (CMS/HCC) 2017   • Esophageal candidiasis (CMS/HCC)    • Hyperlipidemia    • Hypertension    • Insomnia    • Pregnancy        • Thyroid disease      Family History   Adopted: Yes     Social History     Socioeconomic History   • Marital status:       Spouse name: Not on file   • Number of children: Not on file   • Years of education: Not on file   • Highest education level: Not on file   Occupational History   • Occupation:      Comment: full time   Tobacco Use   • Smoking status: Former Smoker     Packs/day: 1.00     Years: 5.00     Pack years: 5.00     Types: Cigarettes     Quit date:      Years since quittin.0   • Smokeless tobacco: Never Used   Substance and Sexual Activity   • Alcohol use: Yes   • Drug use: No   • Sexual activity: Defer     Allergies   Allergen Reactions   • Ampicillin Diarrhea       Current Outpatient Medications:   •  Blood Pressure Monitoring (5 SERIES BP MONITOR) device, , Disp: , Rfl:   •  desvenlafaxine (PRISTIQ) 100 MG 24 hr tablet, Take 1 tablet by mouth Daily., Disp: 30 tablet, Rfl: 10  •  glucose blood test strip, USE TO TEST BLOOD SUGAR 6 TIMES DAILY  ONE TOUCH ULTRA TEST STRIPS, Disp: 600 each, Rfl: 3  •  Ibandronate Sodium (BONIVA PO), Take  by mouth., Disp: , Rfl:   •  insulin aspart (novoLOG FLEXPEN) 100 UNIT/ML solution pen-injector sc pen, Novolog 1 units for 7 gm for BF, 1 unit for 6 gm for lunch, 1 unit for 8 gm for dinner.max daily 50 units, Disp: 15 pen, Rfl: 3  •  insulin degludec (TRESIBA FLEXTOUCH) 100 UNIT/ML solution pen-injector injection, Inject 24 Units under the skin into the appropriate area as directed Every Night. (Patient taking differently: Inject 14 Units under the skin into the appropriate area as directed Every Night.), Disp: 10 pen, Rfl: 3  •  losartan-hydrochlorothiazide (Hyzaar) 100-25 MG per tablet, Take 1 tablet by mouth Daily., Disp: 90 tablet, Rfl: 3  •  MILK THISTLE PO, Take  by mouth Daily., Disp: , Rfl:   •  ONETOUCH VERIO test strip, USE TO TEST BLOOD SUGAR 6 TIMES DAILY, Disp: 600 each, Rfl: 3  •  triazolam (HALCION) 0.25 MG tablet, Take 1.5 tablets by mouth At Night As Needed for Sleep., Disp: 45 tablet, Rfl: 5  •  TURMERIC PO, Take  by mouth., Disp:  ", Rfl:   •  atorvastatin (LIPITOR) 20 MG tablet, Take 1 tablet by mouth Daily., Disp: 90 tablet, Rfl: 3  •  B-D UF III MINI PEN NEEDLES 31G X 5 MM misc, USE TO INJECT INSULIN 4 TIMES DAILY, Disp: 400 each, Rfl: 3        Review of Systems   Constitutional: Positive for appetite change and fatigue. Negative for fever.   Eyes: Negative for visual disturbance.   Respiratory: Negative for shortness of breath.    Cardiovascular: Negative for palpitations and leg swelling.   Gastrointestinal: Negative for abdominal pain and vomiting.   Endocrine: Negative for polydipsia and polyuria.   Musculoskeletal: Negative for joint swelling and neck pain.   Skin: Negative for rash.   Neurological: Negative for weakness and numbness.   Psychiatric/Behavioral: Negative for behavioral problems.        I have reviewed and confirmed the accuracy of the ROS as documented by the MA/LPN/RN Shyam Zuniga MD      Objective       Vitals:    01/04/21 1304   BP: 112/70   Pulse: 81   SpO2: 99%   Weight: 63.7 kg (140 lb 6.4 oz)   Height: 165.1 cm (65\")     Body mass index is 23.36 kg/m².      Physical Exam  Vitals signs reviewed.   Constitutional:       Appearance: Normal appearance. She is normal weight. She is not diaphoretic.   HENT:      Head: Normocephalic and atraumatic.   Eyes:      General: No scleral icterus.     Conjunctiva/sclera: Conjunctivae normal.   Neck:      Musculoskeletal: Normal range of motion and neck supple.      Thyroid: No thyromegaly.      Comments: Left-sided temporomandibular joint swollen  Skin:     General: Skin is warm and dry.   Neurological:      Mental Status: She is alert and oriented to person, place, and time. Mental status is at baseline.   Psychiatric:         Mood and Affect: Mood normal.         Results Review:     I reviewed the patient's new clinical results and mentioned them above in HPI and in plan as well.    Medical records reviewed  Summary:Done      Admission on 07/13/2020, Discharged on " 07/14/2020   Component Date Value Ref Range Status   • Glucose 07/13/2020 237* 65 - 99 mg/dL Final   • BUN 07/13/2020 8  6 - 20 mg/dL Final   • Creatinine 07/13/2020 0.71  0.57 - 1.00 mg/dL Final   • Sodium 07/13/2020 133* 136 - 145 mmol/L Final   • Potassium 07/13/2020 3.1* 3.5 - 5.2 mmol/L Final   • Chloride 07/13/2020 95* 98 - 107 mmol/L Final   • CO2 07/13/2020 27.9  22.0 - 29.0 mmol/L Final   • Calcium 07/13/2020 9.6  8.6 - 10.5 mg/dL Final   • Total Protein 07/13/2020 7.2  6.0 - 8.5 g/dL Final   • Albumin 07/13/2020 4.50  3.50 - 5.20 g/dL Final   • ALT (SGPT) 07/13/2020 12  1 - 33 U/L Final   • AST (SGOT) 07/13/2020 16  1 - 32 U/L Final   • Alkaline Phosphatase 07/13/2020 104  39 - 117 U/L Final   • Total Bilirubin 07/13/2020 0.4  0.0 - 1.2 mg/dL Final   • eGFR Non African Amer 07/13/2020 85  >60 mL/min/1.73 Final   • Globulin 07/13/2020 2.7  gm/dL Final   • A/G Ratio 07/13/2020 1.7  g/dL Final   • BUN/Creatinine Ratio 07/13/2020 11.3  7.0 - 25.0 Final   • Anion Gap 07/13/2020 10.1  5.0 - 15.0 mmol/L Final   • Lipase 07/13/2020 41  13 - 60 U/L Final   • Color, UA 07/14/2020 Yellow  Yellow, Straw Final   • Appearance, UA 07/14/2020 Clear  Clear Final   • pH, UA 07/14/2020 6.5  5.0 - 8.0 Final   • Specific Gravity, UA 07/14/2020 1.009  1.005 - 1.030 Final   • Glucose, UA 07/14/2020 250 mg/dL (1+)* Negative Final   • Ketones, UA 07/14/2020 Negative  Negative Final   • Bilirubin, UA 07/14/2020 Negative  Negative Final   • Blood, UA 07/14/2020 Trace* Negative Final   • Protein, UA 07/14/2020 Negative  Negative Final   • Leuk Esterase, UA 07/14/2020 Negative  Negative Final   • Nitrite, UA 07/14/2020 Negative  Negative Final   • Urobilinogen, UA 07/14/2020 0.2 E.U./dL  0.2 - 1.0 E.U./dL Final   • Troponin T 07/13/2020 <0.010  0.000 - 0.030 ng/mL Final   • Magnesium 07/13/2020 1.9  1.6 - 2.6 mg/dL Final   • WBC 07/13/2020 7.33  3.40 - 10.80 10*3/mm3 Final   • RBC 07/13/2020 4.28  3.77 - 5.28 10*6/mm3 Final   •  Hemoglobin 07/13/2020 13.4  12.0 - 15.9 g/dL Final   • Hematocrit 07/13/2020 40.6  34.0 - 46.6 % Final   • MCV 07/13/2020 94.9  79.0 - 97.0 fL Final   • MCH 07/13/2020 31.3  26.6 - 33.0 pg Final   • MCHC 07/13/2020 33.0  31.5 - 35.7 g/dL Final   • RDW 07/13/2020 13.0  12.3 - 15.4 % Final   • RDW-SD 07/13/2020 45.0  37.0 - 54.0 fl Final   • MPV 07/13/2020 11.2  6.0 - 12.0 fL Final   • Platelets 07/13/2020 266  140 - 450 10*3/mm3 Final   • Neutrophil % 07/13/2020 49.4  42.7 - 76.0 % Final   • Lymphocyte % 07/13/2020 41.7  19.6 - 45.3 % Final   • Monocyte % 07/13/2020 7.1  5.0 - 12.0 % Final   • Eosinophil % 07/13/2020 1.4  0.3 - 6.2 % Final   • Basophil % 07/13/2020 0.3  0.0 - 1.5 % Final   • Immature Grans % 07/13/2020 0.1  0.0 - 0.5 % Final   • Neutrophils, Absolute 07/13/2020 3.62  1.70 - 7.00 10*3/mm3 Final   • Lymphocytes, Absolute 07/13/2020 3.06  0.70 - 3.10 10*3/mm3 Final   • Monocytes, Absolute 07/13/2020 0.52  0.10 - 0.90 10*3/mm3 Final   • Eosinophils, Absolute 07/13/2020 0.10  0.00 - 0.40 10*3/mm3 Final   • Basophils, Absolute 07/13/2020 0.02  0.00 - 0.20 10*3/mm3 Final   • Immature Grans, Absolute 07/13/2020 0.01  0.00 - 0.05 10*3/mm3 Final   • nRBC 07/13/2020 0.0  0.0 - 0.2 /100 WBC Final   • Troponin T 07/14/2020 <0.010  0.000 - 0.030 ng/mL Final   • Troponin T 07/14/2020 <0.010  0.000 - 0.030 ng/mL Final   • TSH 07/14/2020 2.010  0.270 - 4.200 uIU/mL Final   • RBC, UA 07/14/2020 0-2  None Seen, 0-2 /HPF Final   • WBC, UA 07/14/2020 0-2  None Seen, 0-2 /HPF Final   • Bacteria, UA 07/14/2020 None Seen  None Seen /HPF Final   • Squamous Epithelial Cells, UA 07/14/2020 0-2  None Seen, 0-2 /HPF Final   • Hyaline Casts, UA 07/14/2020 None Seen  None Seen /LPF Final   • Methodology 07/14/2020 Automated Microscopy   Final   • Troponin T 07/14/2020 <0.010  0.000 - 0.030 ng/mL Final   •  CV STRESS PROTOCOL 1 07/14/2020 Rubin   Final   • Stage 1 07/14/2020 1   Final   • HR Stage 1 07/14/2020 114   Final   • BP  Stage 1 07/14/2020 159/83   Final   • Duration Min Stage 1 07/14/2020 3   Final   • Duration Sec Stage 1 07/14/2020 0   Final   • Grade Stage 1 07/14/2020 10   Final   • Speed Stage 1 07/14/2020 1.7   Final   • BH CV STRESS METS STAGE 1 07/14/2020 5   Final   • Stage 2 07/14/2020 2   Final   • HR Stage 2 07/14/2020 138   Final   • BP Stage 2 07/14/2020 167/82   Final   • Duration Min Stage 2 07/14/2020 3   Final   • Duration Sec Stage 2 07/14/2020 0   Final   • Grade Stage 2 07/14/2020 12   Final   • Speed Stage 2 07/14/2020 2.5   Final   • BH CV STRESS METS STAGE 2 07/14/2020 7.5   Final   • Stage 3 07/14/2020 3   Final   • HR Stage 3 07/14/2020 148   Final   • BP Stage 3 07/14/2020 177/82   Final   • Duration Min Stage 3 07/14/2020 3   Final   • Duration Sec Stage 3 07/14/2020 0   Final   • Grade Stage 3 07/14/2020 14   Final   • Speed Stage 3 07/14/2020 3.4   Final   • BH CV STRESS METS STAGE 3 07/14/2020 10.0   Final   • Baseline HR 07/14/2020 76  bpm Final   • Baseline BP 07/14/2020 145/90  mmHg Final   • Peak HR 07/14/2020 150  bpm Final   • Percent Max Pred HR 07/14/2020 92.02  % Final   • Percent Target HR 07/14/2020 108  % Final   • Peak BP 07/14/2020 177/92  mmHg Final   • Recovery HR 07/14/2020 94  bpm Final   • Recovery BP 07/14/2020 138/92  mmHg Final   • Target HR (85%) 07/14/2020 139  bpm Final   • Max. Pred. HR (100%) 07/14/2020 163  bpm Final   • Exercise duration (min) 07/14/2020 9  min Final   • Exercise duration (sec) 07/14/2020 0  sec Final   • Estimated workload 07/14/2020 10.2  METS Final   • Glucose 07/14/2020 212* 70 - 130 mg/dL Final   • Troponin T 07/14/2020 <0.010  0.000 - 0.030 ng/mL Final   • Glucose 07/14/2020 244* 70 - 130 mg/dL Final     Lab Results   Component Value Date    HGBA1C 7.30 (H) 07/17/2020    HGBA1C 7.10 (H) 02/28/2020    HGBA1C 7.36 (H) 08/02/2019     Lab Results   Component Value Date    MICROALBUR <3.0 07/17/2020    CREATININE 0.77 07/17/2020     Imaging Results (Most  "Recent)     None                Assessment and Plan:    Diagnoses and all orders for this visit:    1. Long-term insulin use (CMS/[facility]) (Primary)  -     TSH  -     T4, Free  -     Comprehensive Metabolic Panel  -     Hemoglobin A1c  -     Vitamin D 25 Hydroxy  -     Vitamin B12 & Folate  -     Microalbumin / Creatinine Urine Ratio - Urine, Clean Catch  -     Lipid Panel  -     Urinalysis With Microscopic - Urine, Clean Catch    2. DM (diabetes mellitus), type 1, uncontrolled w/neurologic complication (CMS/[facility])  -     TSH  -     T4, Free  -     Comprehensive Metabolic Panel  -     Hemoglobin A1c  -     Vitamin D 25 Hydroxy  -     Vitamin B12 & Folate  -     Microalbumin / Creatinine Urine Ratio - Urine, Clean Catch  -     Lipid Panel  -     Urinalysis With Microscopic - Urine, Clean Catch    3. Hyperlipemia, mixed  -     TSH  -     T4, Free  -     Comprehensive Metabolic Panel  -     Hemoglobin A1c  -     Vitamin D 25 Hydroxy  -     Vitamin B12 & Folate  -     Microalbumin / Creatinine Urine Ratio - Urine, Clean Catch  -     Lipid Panel  -     Urinalysis With Microscopic - Urine, Clean Catch      Type 2 diabetes mellitus-severely uncontrolled, complicated with extreme variability and decreased hypoglycemic awareness.  Explained to the patient with benefits of insulin pump and sensor  Gave her the handout on Medtronic insulin pump.    Check HbA1c  If the A1c is greater than 7.5 would consider adjusting the Tresiba dosage  If the A1c is less than 7.5 would consider adjusting the NovoLog.    Given the extremely variable blood sugars patient would be at high risk for diabetic complications.    Hyperlipidemia  Continue statin.    Reviewed Lab results with the patient.               Shyam Zuniga MD  01/04/21    EMR Dragon / transcription disclaimer:     \"Dictated utilizing Dragon dictation\".      "

## 2021-01-05 LAB
25(OH)D3+25(OH)D2 SERPL-MCNC: 39.1 NG/ML (ref 30–100)
ALBUMIN SERPL-MCNC: 3.8 G/DL (ref 3.8–4.9)
ALBUMIN/CREAT UR: 30 MG/G CREAT (ref 0–29)
ALBUMIN/GLOB SERPL: 1.4 {RATIO} (ref 1.2–2.2)
ALP SERPL-CCNC: 108 IU/L (ref 39–117)
ALT SERPL-CCNC: 18 IU/L (ref 0–32)
AST SERPL-CCNC: 20 IU/L (ref 0–40)
BILIRUB SERPL-MCNC: <0.2 MG/DL (ref 0–1.2)
BUN SERPL-MCNC: 9 MG/DL (ref 6–24)
BUN/CREAT SERPL: 11 (ref 9–23)
CALCIUM SERPL-MCNC: 9.7 MG/DL (ref 8.7–10.2)
CHLORIDE SERPL-SCNC: 92 MMOL/L (ref 96–106)
CHOLEST SERPL-MCNC: 150 MG/DL (ref 100–199)
CO2 SERPL-SCNC: 27 MMOL/L (ref 20–29)
CREAT SERPL-MCNC: 0.84 MG/DL (ref 0.57–1)
CREAT UR-MCNC: 74.1 MG/DL
FOLATE SERPL-MCNC: 17.2 NG/ML
GLOBULIN SER CALC-MCNC: 2.7 G/DL (ref 1.5–4.5)
GLUCOSE SERPL-MCNC: 60 MG/DL (ref 65–99)
HBA1C MFR BLD: 8 % (ref 4.8–5.6)
HDLC SERPL-MCNC: 58 MG/DL
INTERPRETATION: NORMAL
LDLC SERPL CALC-MCNC: 71 MG/DL (ref 0–99)
Lab: NORMAL
MICROALBUMIN UR-MCNC: 22.6 UG/ML
POTASSIUM SERPL-SCNC: 3.4 MMOL/L (ref 3.5–5.2)
PROT SERPL-MCNC: 6.5 G/DL (ref 6–8.5)
SODIUM SERPL-SCNC: 132 MMOL/L (ref 134–144)
T4 FREE SERPL-MCNC: 1.17 NG/DL (ref 0.82–1.77)
TRIGL SERPL-MCNC: 115 MG/DL (ref 0–149)
TSH SERPL DL<=0.005 MIU/L-ACNC: 1.67 UIU/ML (ref 0.45–4.5)
VIT B12 SERPL-MCNC: 728 PG/ML (ref 232–1245)
VLDLC SERPL CALC-MCNC: 21 MG/DL (ref 5–40)

## 2021-01-15 ENCOUNTER — OFFICE VISIT (OUTPATIENT)
Dept: INTERNAL MEDICINE | Facility: CLINIC | Age: 56
End: 2021-01-15

## 2021-01-15 VITALS
HEART RATE: 82 BPM | BODY MASS INDEX: 23.32 KG/M2 | OXYGEN SATURATION: 99 % | TEMPERATURE: 97.7 F | DIASTOLIC BLOOD PRESSURE: 82 MMHG | SYSTOLIC BLOOD PRESSURE: 118 MMHG | RESPIRATION RATE: 16 BRPM | HEIGHT: 65 IN | WEIGHT: 140 LBS

## 2021-01-15 DIAGNOSIS — L02.01 CUTANEOUS ABSCESS OF FACE: Primary | ICD-10-CM

## 2021-01-15 DIAGNOSIS — F51.01 PRIMARY INSOMNIA: ICD-10-CM

## 2021-01-15 DIAGNOSIS — I10 ESSENTIAL HYPERTENSION: ICD-10-CM

## 2021-01-15 DIAGNOSIS — E78.2 MIXED HYPERLIPIDEMIA: ICD-10-CM

## 2021-01-15 PROCEDURE — 99214 OFFICE O/P EST MOD 30 MIN: CPT | Performed by: FAMILY MEDICINE

## 2021-01-15 NOTE — PATIENT INSTRUCTIONS
Skin Abscess    A skin abscess is an infected area of your skin that contains pus and other material. An abscess can happen in any part of your body. Some abscesses break open (rupture) on their own. Most continue to get worse unless they are treated. The infection can spread deeper into the body and into your blood, which can make you feel sick.  A skin abscess is caused by germs that enter the skin through a cut or scrape. It can also be caused by blocked oil and sweat glands or infected hair follicles.  This condition is usually treated by:  · Draining the pus.  · Taking antibiotic medicines.  · Placing a warm, wet washcloth over the abscess.  Follow these instructions at home:  Medicines    · Take over-the-counter and prescription medicines only as told by your doctor.  · If you were prescribed an antibiotic medicine, take it as told by your doctor. Do not stop taking the antibiotic even if you start to feel better.  Abscess care    · If you have an abscess that has not drained, place a warm, clean, wet washcloth over the abscess several times a day. Do this as told by your doctor.  · Follow instructions from your doctor about how to take care of your abscess. Make sure you:  ? Cover the abscess with a bandage (dressing).  ? Change your bandage or gauze as told by your doctor.  ? Wash your hands with soap and water before you change the bandage or gauze. If you cannot use soap and water, use hand .  · Check your abscess every day for signs that the infection is getting worse. Check for:  ? More redness, swelling, or pain.  ? More fluid or blood.  ? Warmth.  ? More pus or a bad smell.  General instructions  · To avoid spreading the infection:  ? Do not share personal care items, towels, or hot tubs with others.  ? Avoid making skin-to-skin contact with other people.  · Keep all follow-up visits as told by your doctor. This is important.  Contact a doctor if:  · You have more redness, swelling, or pain  around your abscess.  · You have more fluid or blood coming from your abscess.  · Your abscess feels warm when you touch it.  · You have more pus or a bad smell coming from your abscess.  · You have a fever.  · Your muscles ache.  · You have chills.  · You feel sick.  Get help right away if:  · You have very bad (severe) pain.  · You see red streaks on your skin spreading away from the abscess.  Summary  · A skin abscess is an infected area of your skin that contains pus and other material.  · The abscess is caused by germs that enter the skin through a cut or scrape. It can also be caused by blocked oil and sweat glands or infected hair follicles.  · Follow your doctor's instructions on caring for your abscess, taking medicines, preventing infections, and keeping follow-up visits.  This information is not intended to replace advice given to you by your health care provider. Make sure you discuss any questions you have with your health care provider.  Document Revised: 07/23/2020 Document Reviewed: 01/31/2019  Elsevier Patient Education © 2020 [employer]

## 2021-01-15 NOTE — PROGRESS NOTES
"Chief Complaint  Hypertension ( follow up ), Hyperlipidemia, and Mouth Lesions, insomnia    Subjective          Ivon Ortega presents to [facility] for   History of Present Illness     Her blood pressure, hyperlipidemia both appear stable.  She is taking the losartan-HCTZ as prescribed as well as the atorvastatin.  No new side effects, chest pain, shortness of breath.    Mouth lesion - Fell December 11th and hit her face on the left and developed a knot in front of her ear.  She notes that it has been painful and causing some draining.  She has been feeling badly over the last few weeks.  She says it felt so pressurized and hurting yesterday that she was planning on lancing it herself at home but she thought better of it.  No fevers that she is aware of and no chills.  She said about a week ago she was feeling some increased lymphadenopathy in her neck on the left side but now she cannot feel them.    Also wants some more help with her sleep as she says the Halcion is not helping anymore and she is having very broken and interrupted sleep.  She said she saw sleep doctor years ago but since she did not have sleep apnea there was little they could do for her.  She is willing to go again.    Review of Systems   Constitutional: Negative for fatigue and fever.   Respiratory: Negative for shortness of breath and wheezing.    Cardiovascular: Negative for chest pain.   Skin:        abscess   Hematological: Negative for adenopathy.   Psychiatric/Behavioral: Positive for sleep disturbance.         Objective   Vital Signs:   /82 (BP Location: Left arm, Patient Position: Sitting, Cuff Size: Adult)   Pulse 82   Temp 97.7 °F (36.5 °C) (Oral)   Resp 16   Ht 165.1 cm (65\")   Wt 63.5 kg (140 lb)   SpO2 99%   BMI 23.30 kg/m²     Physical Exam  Vitals signs and nursing note reviewed.   Constitutional:       General: She is not in acute distress.     Appearance: Normal appearance. "   HENT:      Head:     Cardiovascular:      Rate and Rhythm: Normal rate and regular rhythm.      Heart sounds: Normal heart sounds.   Pulmonary:      Effort: Pulmonary effort is normal.      Breath sounds: Normal breath sounds.   Neurological:      Mental Status: She is alert.   Psychiatric:         Mood and Affect: Mood normal.         Behavior: Behavior normal.        Result Review :   The following data was reviewed by: Les Soto MD on 01/15/2021:  Common labs    Common Labsle 7/13/20 7/13/20 7/17/20 7/17/20 7/17/20 7/17/20 1/4/21 1/4/21 1/4/21 1/4/21    2337 2337 1006 1006 1006 1006 1357 1357 1357 1357   Glucose    188 (A)   60 (A)      BUN  8  10   9      Creatinine  0.71  0.77   0.84      eGFR Non  Am  85  77   77      eGFR  Am    94   89      Sodium  133 (A)  130 (A)   132 (A)      Potassium  3.1 (A)  4.3   3.4 (A)      Chloride  95 (A)  94 (A)   92 (A)      Calcium  9.6  9.5   9.7      Total Protein       6.5      Albumin  4.50     3.8      Total Bilirubin  0.4     <0.2      Alkaline Phosphatase  104     108      AST (SGOT)  16     20      ALT (SGPT)  12     18      WBC 7.33            Hemoglobin 13.4            Hematocrit 40.6            Platelets 266            Total Cholesterol      229 (A)    150   Triglycerides      90    115   HDL Cholesterol      66 (A)    58   LDL Cholesterol       145 (A)    71   Hemoglobin A1C   7.30 (A)     8.0 (A)     Microalbumin, Urine     <3.0    22.6    (A) Abnormal value       Comments are available for some flowsheets but are not being displayed.                     Assessment and Plan    Problem List Items Addressed This Visit        Cardiac and Vasculature    Mixed hyperlipidemia (Chronic)    Essential hypertension (Chronic)       Sleep    Insomnia (Chronic)    Overview     Too sedated with Trazodone         Relevant Orders    Ambulatory Referral to Sleep Medicine      Other Visit Diagnoses     Cutaneous abscess of face    -  Primary    Relevant  Orders    Ambulatory Referral to Plastic Surgery        The patient has an abscess on the left side of her face which needs to be have an incision and drainage however with it being on the face I do not feel comfortable performing that procedure and we got her an urgent referral to plastic surgery across the street at 2:00 today.  I am referring her to sleep medicine for insomnia as Halcion is no longer helping and I really do not not know what else to offer at this point.  She came to me on Halcion.  Hyperlipidemia and essential hypertension appear stable from the numbers and the review I saw today.  She should continue on the medications as described in the HPI.      Follow Up   Return in about 6 months (around 7/15/2021) for Annual physical.  Patient was given instructions and counseling regarding her condition or for health maintenance advice. Please see specific information pulled into the AVS if appropriate.

## 2021-02-02 ENCOUNTER — APPOINTMENT (OUTPATIENT)
Dept: SLEEP MEDICINE | Facility: HOSPITAL | Age: 56
End: 2021-02-02

## 2021-03-01 ENCOUNTER — APPOINTMENT (OUTPATIENT)
Dept: WOMENS IMAGING | Facility: HOSPITAL | Age: 56
End: 2021-03-01

## 2021-03-01 PROCEDURE — 77063 BREAST TOMOSYNTHESIS BI: CPT | Performed by: RADIOLOGY

## 2021-03-01 PROCEDURE — 77067 SCR MAMMO BI INCL CAD: CPT | Performed by: RADIOLOGY

## 2021-03-03 DIAGNOSIS — F51.04 PSYCHOPHYSIOLOGICAL INSOMNIA: ICD-10-CM

## 2021-03-04 ENCOUNTER — TELEPHONE (OUTPATIENT)
Dept: INTERNAL MEDICINE | Facility: CLINIC | Age: 56
End: 2021-03-04

## 2021-03-04 NOTE — TELEPHONE ENCOUNTER
Caller: Ivon Ortega    Relationship: Self    Best call back number: [phone]    SYMPTOMS: GREEN SNOT, COUGHING GREEN PHLEGM, CONGESTION    How long have you been experiencing symptoms: A FEW DAYS    Have you had these symptoms before:    [x] Yes  [] No    Have you been treated for these symptoms before:   [x] Yes  [] No    PATIENT SAW OBGYN RECENTLY AND WAS ADVISED THAT SHE HAD A SINUS INFECTION AND TO CONTACT HER PCP FOR RX.     If a prescription is needed, what is your preferred pharmacy and phone number: [facility] [address] AT [address] [facility] - [phone]  - [phone] FX

## 2021-03-04 NOTE — TELEPHONE ENCOUNTER
This is the last Rx for this Halcion as she was supposed to go to sleep medicine.  I see the appointment was cancelled.  I will not be prescribing the Halcion long term.  She needs to have her sleep medicine appointment rescheduled ASAP.

## 2021-03-05 NOTE — TELEPHONE ENCOUNTER
PATIENT CALLED BACK TO CHECK THE STATUS OF AN ANTIBIOTIC CALLED IN FOR HER SINUS SYMPTOMS. PLEASE CONTACT PATIENT TO ADVISE.     THANKS

## 2021-03-05 NOTE — TELEPHONE ENCOUNTER
Can you help with this?  She called yesterday and has not received a call back, now it is Friday at 4.  Please advise.

## 2021-03-09 NOTE — TELEPHONE ENCOUNTER
Laverne Eubanks MD Simpson, Shereha, MA  Caller: Unspecified (5 days ago,  9:28 AM)  We don't give antibiotics over the phone - pls ask her to go to urgent care       Follow-up call to see how the patient has been doing. She stated she thought she was going to get an abx. I told her that out providers do not send abx in.     Pt stated she did something otc for her sx. She did not report any problems.

## 2021-03-24 ENCOUNTER — BULK ORDERING (OUTPATIENT)
Dept: CASE MANAGEMENT | Facility: OTHER | Age: 56
End: 2021-03-24

## 2021-03-24 DIAGNOSIS — Z23 IMMUNIZATION DUE: ICD-10-CM

## 2021-05-06 DIAGNOSIS — IMO0002 DM (DIABETES MELLITUS), TYPE 1, UNCONTROLLED W/NEUROLOGIC COMPLICATION: Primary | ICD-10-CM

## 2021-05-06 DIAGNOSIS — E10.43 TYPE 1 DIABETES MELLITUS WITH DIABETIC AUTONOMIC NEUROPATHY (HCC): ICD-10-CM

## 2021-05-17 ENCOUNTER — TELEPHONE (OUTPATIENT)
Dept: INTERNAL MEDICINE | Facility: CLINIC | Age: 56
End: 2021-05-17

## 2021-05-17 DIAGNOSIS — F51.04 PSYCHOPHYSIOLOGICAL INSOMNIA: ICD-10-CM

## 2021-05-17 NOTE — TELEPHONE ENCOUNTER
That is fine but I still cannot give her any more right now as she picked it up very recently.  Not sure why she called stating she had 13 left.

## 2021-05-17 NOTE — TELEPHONE ENCOUNTER
I referred her to sleep.  Can you tell what happened with that?  I can fill for another month but if she wants me to continue prescribing, I need her to follow-up within a month for contract and UDS.

## 2021-05-17 NOTE — TELEPHONE ENCOUNTER
Caller: Ivon Ortega    Relationship: Self    Best call back number: [phone]     Medication needed:   Requested Prescriptions     Pending Prescriptions Disp Refills   • triazolam (HALCION) 0.25 MG tablet 45 tablet 2       When do you need the refill by: ASAP    What additional details did the patient provide when requesting the medication: PATIENT HAS 13 LEFT     Does the patient have less than a 3 day supply:  [] Yes  [x] No    What is the patient's preferred pharmacy: [facility] & [facility] - [phone] [facility] [phone] FX

## 2021-05-17 NOTE — TELEPHONE ENCOUNTER
Patient schedule an appointment for June 15th to discuss further also to complete drug contract/UDS. She states she did not go to sleep medicine as she went years ago and it did not seem to help

## 2021-05-18 ENCOUNTER — OFFICE VISIT (OUTPATIENT)
Dept: ENDOCRINOLOGY | Age: 56
End: 2021-05-18

## 2021-05-18 VITALS
SYSTOLIC BLOOD PRESSURE: 142 MMHG | OXYGEN SATURATION: 99 % | DIASTOLIC BLOOD PRESSURE: 92 MMHG | HEIGHT: 65 IN | HEART RATE: 80 BPM | BODY MASS INDEX: 22.59 KG/M2 | WEIGHT: 135.6 LBS

## 2021-05-18 DIAGNOSIS — E78.2 HYPERLIPEMIA, MIXED: ICD-10-CM

## 2021-05-18 DIAGNOSIS — IMO0002 DM (DIABETES MELLITUS), TYPE 1, UNCONTROLLED W/NEUROLOGIC COMPLICATION: ICD-10-CM

## 2021-05-18 DIAGNOSIS — Z79.4 LONG-TERM INSULIN USE (HCC): Primary | ICD-10-CM

## 2021-05-18 PROCEDURE — 99214 OFFICE O/P EST MOD 30 MIN: CPT | Performed by: INTERNAL MEDICINE

## 2021-05-18 RX ORDER — NAPROXEN 375 MG/1
375 TABLET ORAL
COMMUNITY
Start: 2021-04-27 | End: 2022-01-05

## 2021-05-18 RX ORDER — BIOTIN 10 MG
TABLET ORAL
COMMUNITY

## 2021-05-18 NOTE — PROGRESS NOTES
"Chief Complaint  Chief Complaint   Patient presents with   • Diabetes     testing bs 5 x day / last dm eye exam 9/2020   • Hyperlipidemia   • Hypertension   • Osteoporosis   • hypokalemia       Subjective          History of Present Illness    Ivon Ortega 56 y.o. presents with Type 1 dm as a F/u patient.     Type 1 dm - Diagnosed for about 15 years  Today in clinic pt reports being on tresiba 14 - 15 units in the evening at around 6 - 7 pm, novolog 1 unit - 6 gm of carbs with each meal, 1 unit for 50 over 140.  Avg bg - 199 mg/dl. Highest BG > 600, and lowest BG - 26 mg/dl.   Checks BG - 5 times a day  Insulin pump - x  Sensor - x  Dm retinopathy - no hx,Last eye exam - sep 2020  Dm nephropathy - x  Dm neuropathy -yes ,Dm neuropathy meds -  Not on meds  CAD -x  CVA -x  Episodes of hypoglycemia - lowest BG - 26 mg/dl, does have decreased hypoglycemic awareness.   Pt is physically active. weight has been stable.   Pt tries to follow DM diet for most part.   Prior DKA episodes - 2017.     HLP   On liptor 20 mg po daily.     Reviewed primary care physician's/consulting physician documentation and lab results         I have reviewed the patient's allergies, medicines, past medical hx, family hx and social hx in detail.    Objective   Vital Signs:   /92 (BP Location: Right arm, Patient Position: Sitting, Cuff Size: Small Adult)   Pulse 80   Ht 165.1 cm (65\")   Wt 61.5 kg (135 lb 9.6 oz)   SpO2 99%   BMI 22.57 kg/m²   Physical Exam   General appearance - no distress  Eyes- anicteric sclera  Ear nose and throat-external ears and nose normal.    Respiratory-normal chest on inspection.  No respiratory distress noted.  Skin-no rashes.  Neuro-alert and oriented x3            Result Review :   The following data was reviewed by: Shyam Zuniga MD on 05/18/2021:  Results Encounter on 05/07/2021   Component Date Value Ref Range Status   • Creatinine, Urine 05/10/2021 90.4  Not Estab. mg/dL Final   • Microalbumin, " Urine 05/10/2021 36.3  Not Estab. ug/mL Final   • Microalbumin/Creatinine Ratio 05/10/2021 40* 0 - 29 mg/g creat Final    Comment:                        Normal:                0 -  29                         Moderately increased: 30 - 300                         Severely increased:       >300     • 25 Hydroxy, Vitamin D 05/10/2021 44.2  30.0 - 100.0 ng/mL Final    Comment: Results may be falsely increased if patient taking Biotin.  Reference Range for Total Vitamin D 25(OH)  Deficiency <20.0 ng/mL  Insufficiency 21-29 ng/mL  Sufficiency  ng/mL  Toxicity >100 ng/ml     • Total Cholesterol 05/10/2021 190  0 - 200 mg/dL Final    Comment: Cholesterol Reference Ranges  (U.S. Department of Health and Human Services ATP III  Classifications)  Desirable          <200 mg/dL  Borderline High    200-239 mg/dL  High Risk          >240 mg/dL  Triglyceride Reference Ranges  (U.S. Department of Health and Human Services ATP III  Classifications)  Normal           <150 mg/dL  Borderline High  150-199 mg/dL  High             200-499 mg/dL  Very High        >500 mg/dL  HDL Reference Ranges  (U.S. Department of Health and Human Services ATP III  Classifcations)  Low     <40 mg/dl (major risk factor for CHD)  High    >60 mg/dl ('negative' risk factor for CHD)  LDL Reference Ranges  (U.S. Department of Health and Human Services ATP III  Classifcations)  Optimal          <100 mg/dL  Near Optimal     100-129 mg/dL  Borderline High  130-159 mg/dL  High             160-189 mg/dL  Very High        >189 mg/dL     • Triglycerides 05/10/2021 107  0 - 150 mg/dL Final   • HDL Cholesterol 05/10/2021 98* 40 - 60 mg/dL Final   • VLDL Cholesterol Anthony 05/10/2021 18  5 - 40 mg/dL Final   • LDL Chol Calc ([facility]) 05/10/2021 74  0 - 100 mg/dL Final   • Free T4 05/10/2021 1.31  0.93 - 1.70 ng/dL Final    Results may be falsely increased if patient taking Biotin.   • TSH 05/10/2021 1.970  0.270 - 4.200 uIU/mL Final   • Hemoglobin A1C 05/10/2021 7.60*  4.80 - 5.60 % Final    Comment: Hemoglobin A1C Ranges:  Increased Risk for Diabetes  5.7% to 6.4%  Diabetes                     >= 6.5%  Diabetic Goal                < 7.0%     • Glucose 05/10/2021 84  65 - 99 mg/dL Final   • BUN 05/10/2021 10  6 - 20 mg/dL Final   • Creatinine 05/10/2021 0.69  0.57 - 1.00 mg/dL Final   • eGFR Non  Am 05/10/2021 87  >60 mL/min/1.73 Final    Comment: GFR Normal >60  Chronic Kidney Disease <60  Kidney Failure <15     • eGFR  Am 05/10/2021 106  >60 mL/min/1.73 Final   • BUN/Creatinine Ratio 05/10/2021 14.5  7.0 - 25.0 Final   • Sodium 05/10/2021 131* 136 - 145 mmol/L Final   • Potassium 05/10/2021 3.6  3.5 - 5.2 mmol/L Final   • Chloride 05/10/2021 94* 98 - 107 mmol/L Final   • Total CO2 05/10/2021 29.3* 22.0 - 29.0 mmol/L Final   • Calcium 05/10/2021 10.2  8.6 - 10.5 mg/dL Final   • Total Protein 05/10/2021 6.8  6.0 - 8.5 g/dL Final   • Albumin 05/10/2021 4.70  3.50 - 5.20 g/dL Final   • Globulin 05/10/2021 2.1  gm/dL Final   • A/G Ratio 05/10/2021 2.2  g/dL Final   • Total Bilirubin 05/10/2021 0.4  0.0 - 1.2 mg/dL Final   • Alkaline Phosphatase 05/10/2021 96  39 - 117 U/L Final   • AST (SGOT) 05/10/2021 17  1 - 32 U/L Final   • ALT (SGPT) 05/10/2021 12  1 - 33 U/L Final   • Interpretation 05/10/2021 Note   Final    Supplemental report is available.     Data reviewed: PCP documentation       Results Review:    I reviewed the patient's new clinical results.     Assessment and Plan    Problem List Items Addressed This Visit     None      Visit Diagnoses     Long-term insulin use (CMS/HCC)    -  Primary    Relevant Orders    Basic Metabolic Panel    Hemoglobin A1c    DM (diabetes mellitus), type 1, uncontrolled w/neurologic complication (CMS/HCC)        Relevant Orders    Basic Metabolic Panel    Hemoglobin A1c    Hyperlipemia, mixed        Relevant Orders    Basic Metabolic Panel    Hemoglobin A1c        Type 1 dm - uncontrolled - with variable BG - up and down.  "  Recommend increasing tresiba to 15 - 16 units in the evening.   Continue novolog 1 unit for 6 mg of carbs.   Continue novolog ssi.     Hyperlipidemia   On lipitor 20 mg po daily.     Interpreted the blood work-up/imaging results performed by the primary care/consulting physician -    Refills sent to pharmacy    Follow Up     Patient was given instructions and counseling regarding her condition or for health maintenance advice. Please see specific information pulled into the AVS if appropriate.       Thank you for asking me to see your patient, Ivon Ortega in consultation.         Shyam Zuniga MD  05/18/21      EMR Dragon / transcription disclaimer:     \"Dictated utilizing Dragon dictation\".               "

## 2021-06-14 NOTE — PROGRESS NOTES
"Chief Complaint  Insomnia    Subjective          Ivon Ortega presents to [facility]  History of Present Illness    Insomnia -stable taking 1.5 tablets of 0.25 Halcion and has been at that dose for a few years now.  Denies any daytime sedation or falls.  [place]  PDMP reviewed and substance contract completed.    Objective   Vital Signs:   /78 (BP Location: Left arm, Patient Position: Sitting, Cuff Size: Adult)   Pulse 84   Temp 98 °F (36.7 °C) (Temporal)   Resp 16   Ht 165.1 cm (65\")   Wt 61.4 kg (135 lb 6.4 oz)   SpO2 98%   BMI 22.53 kg/m²     Physical Exam  Vitals and nursing note reviewed.   Constitutional:       General: She is not in acute distress.     Appearance: Normal appearance.   Cardiovascular:      Rate and Rhythm: Normal rate and regular rhythm.      Heart sounds: Normal heart sounds. No murmur heard.     Pulmonary:      Effort: Pulmonary effort is normal.      Breath sounds: Normal breath sounds.   Neurological:      Mental Status: She is alert.   Psychiatric:         Mood and Affect: Mood normal.         Behavior: Behavior normal.         Thought Content: Thought content normal.         Judgment: Judgment normal.        Result Review :                 Assessment and Plan    Diagnoses and all orders for this visit:    1. Psychophysiological insomnia  -     triazolam (HALCION) 0.25 MG tablet; Take 1.5 tabs per night for sleep  Dispense: 45 tablet; Refill: 5      Triazolam renewed.  We will do a random urine drug screen in the future.  She has an appointment to see me again in August.      Follow Up   No follow-ups on file.  Patient was given instructions and counseling regarding her condition or for health maintenance advice. Please see specific information pulled into the AVS if appropriate.       "

## 2021-06-15 ENCOUNTER — OFFICE VISIT (OUTPATIENT)
Dept: INTERNAL MEDICINE | Facility: CLINIC | Age: 56
End: 2021-06-15

## 2021-06-15 VITALS
OXYGEN SATURATION: 98 % | WEIGHT: 135.4 LBS | TEMPERATURE: 98 F | RESPIRATION RATE: 16 BRPM | BODY MASS INDEX: 22.56 KG/M2 | HEIGHT: 65 IN | SYSTOLIC BLOOD PRESSURE: 120 MMHG | HEART RATE: 84 BPM | DIASTOLIC BLOOD PRESSURE: 78 MMHG

## 2021-06-15 DIAGNOSIS — F51.04 PSYCHOPHYSIOLOGICAL INSOMNIA: ICD-10-CM

## 2021-06-15 PROCEDURE — 99213 OFFICE O/P EST LOW 20 MIN: CPT | Performed by: FAMILY MEDICINE

## 2021-06-15 NOTE — PATIENT INSTRUCTIONS
Insomnia  Insomnia is a sleep disorder that makes it difficult to fall asleep or stay asleep. Insomnia can cause fatigue, low energy, difficulty concentrating, mood swings, and poor performance at work or school.  There are three different ways to classify insomnia:  · Difficulty falling asleep.  · Difficulty staying asleep.  · Waking up too early in the morning.  Any type of insomnia can be long-term (chronic) or short-term (acute). Both are common. Short-term insomnia usually lasts for three months or less. Chronic insomnia occurs at least three times a week for longer than three months.  What are the causes?  Insomnia may be caused by another condition, situation, or substance, such as:  · Anxiety.  · Certain medicines.  · Gastroesophageal reflux disease (GERD) or other gastrointestinal conditions.  · Asthma or other breathing conditions.  · Restless legs syndrome, sleep apnea, or other sleep disorders.  · Chronic pain.  · Menopause.  · Stroke.  · Abuse of alcohol, tobacco, or illegal drugs.  · Mental health conditions, such as depression.  · Caffeine.  · Neurological disorders, such as Alzheimer's disease.  · An overactive thyroid (hyperthyroidism).  Sometimes, the cause of insomnia may not be known.  What increases the risk?  Risk factors for insomnia include:  · Gender. Women are affected more often than men.  · Age. Insomnia is more common as you get older.  · Stress.  · Lack of exercise.  · Irregular work schedule or working night shifts.  · Traveling between different time zones.  · Certain medical and mental health conditions.  What are the signs or symptoms?  If you have insomnia, the main symptom is having trouble falling asleep or having trouble staying asleep. This may lead to other symptoms, such as:  · Feeling fatigued or having low energy.  · Feeling nervous about going to sleep.  · Not feeling rested in the morning.  · Having trouble concentrating.  · Feeling irritable, anxious, or depressed.  How  is this diagnosed?  This condition may be diagnosed based on:  · Your symptoms and medical history. Your health care provider may ask about:  ? Your sleep habits.  ? Any medical conditions you have.  ? Your mental health.  · A physical exam.  How is this treated?  Treatment for insomnia depends on the cause. Treatment may focus on treating an underlying condition that is causing insomnia. Treatment may also include:  · Medicines to help you sleep.  · Counseling or therapy.  · Lifestyle adjustments to help you sleep better.  Follow these instructions at home:  Eating and drinking    · Limit or avoid alcohol, caffeinated beverages, and cigarettes, especially close to bedtime. These can disrupt your sleep.  · Do not eat a large meal or eat spicy foods right before bedtime. This can lead to digestive discomfort that can make it hard for you to sleep.  Sleep habits    · Keep a sleep diary to help you and your health care provider figure out what could be causing your insomnia. Write down:  ? When you sleep.  ? When you wake up during the night.  ? How well you sleep.  ? How rested you feel the next day.  ? Any side effects of medicines you are taking.  ? What you eat and drink.  · Make your bedroom a dark, comfortable place where it is easy to fall asleep.  ? Put up shades or blackout curtains to block light from outside.  ? Use a white noise machine to block noise.  ? Keep the temperature cool.  · Limit screen use before bedtime. This includes:  ? Watching TV.  ? Using your smartphone, tablet, or computer.  · Stick to a routine that includes going to bed and waking up at the same times every day and night. This can help you fall asleep faster. Consider making a quiet activity, such as reading, part of your nighttime routine.  · Try to avoid taking naps during the day so that you sleep better at night.  · Get out of bed if you are still awake after 15 minutes of trying to sleep. Keep the lights down, but try reading or  doing a quiet activity. When you feel sleepy, go back to bed.  General instructions  · Take over-the-counter and prescription medicines only as told by your health care provider.  · Exercise regularly, as told by your health care provider. Avoid exercise starting several hours before bedtime.  · Use relaxation techniques to manage stress. Ask your health care provider to suggest some techniques that may work well for you. These may include:  ? Breathing exercises.  ? Routines to release muscle tension.  ? Visualizing peaceful scenes.  · Make sure that you drive carefully. Avoid driving if you feel very sleepy.  · Keep all follow-up visits as told by your health care provider. This is important.  Contact a health care provider if:  · You are tired throughout the day.  · You have trouble in your daily routine due to sleepiness.  · You continue to have sleep problems, or your sleep problems get worse.  Get help right away if:  · You have serious thoughts about hurting yourself or someone else.  If you ever feel like you may hurt yourself or others, or have thoughts about taking your own life, get help right away. You can go to your nearest emergency department or call:  · Your local emergency services (911 in the [place]  · A suicide crisis helpline, such as the National Suicide Prevention Lifeline at 1-686.596.2642. This is open 24 hours a day.  Summary  · Insomnia is a sleep disorder that makes it difficult to fall asleep or stay asleep.  · Insomnia can be long-term (chronic) or short-term (acute).  · Treatment for insomnia depends on the cause. Treatment may focus on treating an underlying condition that is causing insomnia.  · Keep a sleep diary to help you and your health care provider figure out what could be causing your insomnia.  This information is not intended to replace advice given to you by your health care provider. Make sure you discuss any questions you have with your health care provider.  Document  Revised: 11/30/2018 Document Reviewed: 09/27/2018  Elsevier Patient Education © 2021 [employer]

## 2021-08-22 DIAGNOSIS — F41.9 ANXIETY: ICD-10-CM

## 2021-08-23 ENCOUNTER — OFFICE VISIT (OUTPATIENT)
Dept: INTERNAL MEDICINE | Facility: CLINIC | Age: 56
End: 2021-08-23

## 2021-08-23 VITALS
DIASTOLIC BLOOD PRESSURE: 74 MMHG | BODY MASS INDEX: 21.86 KG/M2 | TEMPERATURE: 98 F | HEIGHT: 65 IN | OXYGEN SATURATION: 98 % | HEART RATE: 90 BPM | WEIGHT: 131.2 LBS | SYSTOLIC BLOOD PRESSURE: 138 MMHG

## 2021-08-23 DIAGNOSIS — Z79.899 LONG-TERM CURRENT USE OF BENZODIAZEPINE: ICD-10-CM

## 2021-08-23 DIAGNOSIS — F51.04 PSYCHOPHYSIOLOGICAL INSOMNIA: ICD-10-CM

## 2021-08-23 DIAGNOSIS — Z12.11 SCREEN FOR COLON CANCER: ICD-10-CM

## 2021-08-23 DIAGNOSIS — F41.9 ANXIETY: ICD-10-CM

## 2021-08-23 DIAGNOSIS — Z00.00 ENCOUNTER FOR HEALTH MAINTENANCE EXAMINATION IN ADULT: Primary | ICD-10-CM

## 2021-08-23 DIAGNOSIS — Z79.899 HIGH RISK MEDICATION USE: ICD-10-CM

## 2021-08-23 DIAGNOSIS — G47.00 INSOMNIA, UNSPECIFIED TYPE: ICD-10-CM

## 2021-08-23 PROCEDURE — 99396 PREV VISIT EST AGE 40-64: CPT | Performed by: FAMILY MEDICINE

## 2021-08-23 RX ORDER — DESVENLAFAXINE 100 MG/1
TABLET, EXTENDED RELEASE ORAL
Qty: 30 TABLET | Refills: 10 | Status: SHIPPED | OUTPATIENT
Start: 2021-08-23 | End: 2022-07-25 | Stop reason: SDUPTHER

## 2021-08-23 RX ORDER — METHOCARBAMOL 500 MG/1
500 TABLET, FILM COATED ORAL AS NEEDED
COMMUNITY
Start: 2021-08-02 | End: 2022-01-05

## 2021-08-23 RX ORDER — INSULIN ASPART 100 [IU]/ML
INJECTION, SOLUTION INTRAVENOUS; SUBCUTANEOUS
Qty: 45 ML | Refills: 3 | Status: SHIPPED | OUTPATIENT
Start: 2021-08-23 | End: 2022-05-02 | Stop reason: SDUPTHER

## 2021-08-23 RX ORDER — INSULIN DEGLUDEC INJECTION 100 U/ML
INJECTION, SOLUTION SUBCUTANEOUS
Qty: 30 ML | Refills: 3 | Status: SHIPPED | OUTPATIENT
Start: 2021-08-23 | End: 2022-05-02 | Stop reason: SDUPTHER

## 2021-08-23 NOTE — PROGRESS NOTES
"Chief Complaint  Annual Exam (physical )    Subjective            Ivon Ortega presents to [facility]  History of Present Illness    CPE- Patient reporting that health is doing well overall.  Patient is here today for annual physical.      Taking triazolam as prescribed.  PDMP reviewed and is appropriate.  Controlled med agreement on file and up-to-date.  Due for UDS.  Feeling very overwhelmed due to her anxiety and pursuing disability due to mental health.  She is getting up multiple times a night and not sleeping through the night.  The patient has been on triazolam long ter.  Also on Pristiq for the anxiety    Labs: done by endocrinology  Due for vaccines: covid series    Last colonoscopy: due for cologuard  Last Mammogram: managed by GYN  Last PAP: managed by GYN        Review of Systems   Constitutional: Negative for fatigue and fever.   Respiratory: Negative for shortness of breath and wheezing.    Cardiovascular: Negative for chest pain.         Objective   Vital Signs:   /74 (BP Location: Left arm, Patient Position: Sitting, Cuff Size: Adult)   Pulse 90   Temp 98 °F (36.7 °C)   Ht 165.1 cm (65\")   Wt 59.5 kg (131 lb 3.2 oz)   SpO2 98%   BMI 21.83 kg/m²     Physical Exam  Vitals and nursing note reviewed.   Constitutional:       General: She is not in acute distress.     Appearance: Normal appearance.   HENT:      Right Ear: Tympanic membrane, ear canal and external ear normal.      Left Ear: Tympanic membrane, ear canal and external ear normal.      Nose: Nose normal.      Mouth/Throat:      Mouth: Mucous membranes are moist.   Eyes:      General:         Right eye: No discharge.         Left eye: No discharge.      Conjunctiva/sclera: Conjunctivae normal.   Cardiovascular:      Rate and Rhythm: Normal rate and regular rhythm.      Pulses: Normal pulses.      Heart sounds: Normal heart sounds.   Pulmonary:      Effort: Pulmonary effort is normal.      Breath " sounds: Normal breath sounds.   Abdominal:      General: Bowel sounds are normal. There is no distension.      Palpations: Abdomen is soft.      Tenderness: There is no abdominal tenderness.   Musculoskeletal:      Cervical back: Neck supple.      Right lower leg: No edema.      Left lower leg: No edema.   Lymphadenopathy:      Cervical: No cervical adenopathy.   Skin:     General: Skin is warm.      Findings: No rash.   Neurological:      General: No focal deficit present.      Mental Status: She is alert. Mental status is at baseline.   Psychiatric:         Mood and Affect: Mood normal.         Behavior: Behavior normal.        Result Review :   The following data was reviewed by: Les Soto MD on 08/23/2021:  Common labs    Common Labsle 1/4/21 1/4/21 1/4/21 1/4/21 5/10/21 5/10/21 5/10/21 5/10/21    1357 1357 1357 1357 1010 1010 1010 1010   Glucose 60 (A)       84   BUN 9       10   Creatinine 0.84       0.69   eGFR Non  Am 77       87   eGFR  Am 89       106   Sodium 132 (A)       131 (A)   Potassium 3.4 (A)       3.6   Chloride 92 (A)       94 (A)   Calcium 9.7       10.2   Total Protein 6.5       6.8   Albumin 3.8       4.70   Total Bilirubin <0.2       0.4   Alkaline Phosphatase 108       96   AST (SGOT) 20       17   ALT (SGPT) 18       12   Total Cholesterol    150  190     Triglycerides    115  107     HDL Cholesterol    58  98 (A)     LDL Cholesterol     71  74     Hemoglobin A1C  8.0 (A)     7.60 (A)    Microalbumin, Urine   22.6  36.3      (A) Abnormal value       Comments are available for some flowsheets but are not being displayed.                     Assessment and Plan    Diagnoses and all orders for this visit:    1. Encounter for health maintenance examination in adult (Primary)  -     Cologuard - Stool, Per Rectum; Future    2. High risk medication use  -     Compliance Drug Analysis, Ur - Urine, Clean Catch    3. Screen for colon cancer  -     Cologuard - Stool, Per Rectum;  Future    4. Anxiety  -     Ambulatory Referral to Behavioral Health    5. Psychophysiological insomnia  -     Ambulatory Referral to Behavioral Health    6. Insomnia, unspecified type  -     Ambulatory Referral to Sleep Medicine    7. Long-term current use of benzodiazepine  -     Ambulatory Referral to Behavioral Health          The patient was counseled regarding nutrition, physical activity, healthy weight, injury prevention, misuse of tobacco, alcohol and illicit drugs, mental health, immunizations, and screenings.    We will need urine drug screen today.  I explained to the patient that I do not see very many patients on triazolam anymore and do not have any experience transitioning them off of that medication but it sounds like this medication is not helping her anymore, potentially could be making it worse.  I am going to refer her to behavioral health for the anxiety and sleep medicine for the chronic insomnia.  Hopefully behavioral health can take over the benzodiazepine and get her transitioned onto something that would be more beneficial for her.  Also recommended that she consult with a  as she will need to see a disability physician as I do not do those kind of exams.      Follow Up   Return in about 6 months (around 2/23/2022) for Recheck - anxiety, HTN.  Patient was given instructions and counseling regarding her condition or for health maintenance advice. Please see specific information pulled into the AVS if appropriate.

## 2021-08-23 NOTE — PATIENT INSTRUCTIONS

## 2021-08-26 DIAGNOSIS — I10 ESSENTIAL HYPERTENSION: ICD-10-CM

## 2021-08-26 RX ORDER — LOSARTAN POTASSIUM AND HYDROCHLOROTHIAZIDE 25; 100 MG/1; MG/1
TABLET ORAL
Qty: 90 TABLET | Refills: 3 | Status: SHIPPED | OUTPATIENT
Start: 2021-08-26 | End: 2022-08-25

## 2021-08-27 LAB — DRUGS UR: NORMAL

## 2021-09-15 ENCOUNTER — TELEPHONE (OUTPATIENT)
Dept: INTERNAL MEDICINE | Facility: CLINIC | Age: 56
End: 2021-09-15

## 2021-09-15 NOTE — TELEPHONE ENCOUNTER
Called pt and left message. Referral sent to [facility], pt declined [place] location.   Moved referral to [facility] option.

## 2021-09-15 NOTE — TELEPHONE ENCOUNTER
Caller: Ivon Ortega    Relationship: Self    Best call back number: [phone]     What is the best time to reach you: ANYTIME AFTER 12:30PM    Who are you requesting to speak with (clinical staff, provider,  specific staff member):     Do you know the name of the person who called:    What was the call regarding: STATUS OF PHYCHIATRIC REFERRAL     Do you require a callback: YES

## 2021-09-16 NOTE — TELEPHONE ENCOUNTER
pt called and said she did not want to do virtual option either.  would like to call around and see who she wants to go to.

## 2021-09-29 ENCOUNTER — APPOINTMENT (OUTPATIENT)
Dept: SLEEP MEDICINE | Facility: HOSPITAL | Age: 56
End: 2021-09-29

## 2021-10-12 ENCOUNTER — OFFICE VISIT (OUTPATIENT)
Dept: SLEEP MEDICINE | Facility: HOSPITAL | Age: 56
End: 2021-10-12

## 2021-10-12 VITALS
WEIGHT: 132 LBS | HEART RATE: 98 BPM | DIASTOLIC BLOOD PRESSURE: 87 MMHG | BODY MASS INDEX: 21.99 KG/M2 | OXYGEN SATURATION: 97 % | HEIGHT: 65 IN | SYSTOLIC BLOOD PRESSURE: 155 MMHG

## 2021-10-12 DIAGNOSIS — G47.8 NON-RESTORATIVE SLEEP: ICD-10-CM

## 2021-10-12 DIAGNOSIS — G47.63 SLEEP-RELATED BRUXISM: ICD-10-CM

## 2021-10-12 DIAGNOSIS — G47.10 HYPERSOMNIA: Primary | ICD-10-CM

## 2021-10-12 DIAGNOSIS — G47.00 INSOMNIA, UNSPECIFIED TYPE: Chronic | ICD-10-CM

## 2021-10-12 PROCEDURE — G0463 HOSPITAL OUTPT CLINIC VISIT: HCPCS

## 2021-10-12 PROCEDURE — 99203 OFFICE O/P NEW LOW 30 MIN: CPT | Performed by: FAMILY MEDICINE

## 2021-10-12 NOTE — PROGRESS NOTES
[facility] New Patient[facility]Consultation       Reason for Consultation: INSOMNIA      Patient Care Team:  Les Soto MD as PCP - General (Family Medicine)  Shyam Zuniga MD as Consulting Physician (Endocrinology)  Timbo Guerrero MD as Consulting Physician (Sleep Medicine)      History of present illness:  Thank you for asking me to see your patient.  The patient is a 56 y.o. female with hypertension hyperlipidemia type 1 diabetes mellitus fatty infiltration of liver anxiety osteoporosis degenerative disc disease presents today with concerns regarding insomnia.  Takes triazolam 0.25 mg tablet; 1.5 tablets nightly.  No history of prior sleep study or tonsillectomy.  Patient reports hypersomnia nonrestorative sleep.  Does not work rotating shifts however works from 4 AM to 12:30 PM.  Has been told that she snores sometimes also wakes with morning headaches and dry mouth.  Sweats excessively during sleep sometimes and grinds her teeth sometimes.  Does report nocturia as well.    Sleep Schedule:  Bed time: Weekdays 7 PM to 8 PM weekends 8 PM to 9 PM  Sleep latency: Hours  Wake time: Weekdays 2:15 AM weekends very  Average hours slept: 3-5  Non-restorative sleep: Yes  Number of naps per day: 0  Rotating shifts?:  No however works from 4 AM to 12:30 PM  Nocturia: Yes  Electronics in bedroom: N    Excessive daytime sleepiness or drowsiness:y  Any accidents at work due to sleepiness in the last 5 years:N  Any difficulty driving due to sleepiness or being drowsy: N  Weight changed in the last 5 years:Y - LOST 18 LBS    Snoring:SOMETIMES  Witnessed apneas:N  Have you ever awakened gasping for breath, coughing, choking or respiratory discomfort: N  Morning headaches: Y  Awaken with a sore throat or dry mouth: Y    Any reports of leg jerking at night: N  Urge sensations: N  Does pain disrupt sleep: Y  Sweating during sleep: Y  Teeth grinding: Y    Any sudden episodes of sleep during the day: N  Sleep  paralysis/hallucinations: N  Muscle weakness with laughing/anger: N  Nightmares: N  Sleep walking: N    Are you sleepy when you increase your sleep time: N  Do you sleep better away from your own bed: N    ESS: 3    Social History:  smoked 10 cigarettes a day for ages [age] 3-4 alcoholic drinks per week 4 caffeinated beverages a day no drug use    Review of Systems:    A complete review of systems was done and all were negative with the exception of frequent urination nasal congestion postnasal drip joint pain anxiety depression    Allergies:  Ampicillin    Family History: BRANT no       Current Outpatient Medications:   •  atorvastatin (LIPITOR) 20 MG tablet, Take 1 tablet by mouth Daily., Disp: 90 tablet, Rfl: 3  •  B-D UF III MINI PEN NEEDLES 31G X 5 MM misc, USE TO INJECT INSULIN 4 TIMES DAILY, Disp: 400 each, Rfl: 3  •  Blood Pressure Monitoring (5 SERIES BP MONITOR) device, , Disp: , Rfl:   •  desvenlafaxine (PRISTIQ) 100 MG 24 hr tablet, TAKE ONE TABLET BY MOUTH DAILY, Disp: 30 tablet, Rfl: 10  •  glucose blood test strip, USE TO TEST BLOOD SUGAR 6 TIMES DAILY  ONE TOUCH ULTRA TEST STRIPS, Disp: 600 each, Rfl: 3  •  Ibandronate Sodium (BONIVA PO), Take  by mouth Every 30 (Thirty) Days., Disp: , Rfl:   •  insulin aspart (NovoLOG FlexPen) 100 UNIT/ML solution pen-injector sc pen, INJECT 1 UNIT FOR 7 GRAMS FOR BREAKFAST, 1 UNIT FOR 6 GRAMS FOR LUNCH, 1 UNIT FOR 8 GRAMS FOR DINNER. MAXIMUM DAILY DOSE 50 UNITS, Disp: 45 mL, Rfl: 3  •  insulin degludec (Tresiba FlexTouch) 100 UNIT/ML solution pen-injector injection, INJECT 1 UNIT FOR 7 GRAMS FOR BREAKFAST, 1 UNIT FOR 6 GRAMS FOR LUNCH, 1 UNIT FOR 8 GRAMS FOR DINNER. MAXIMUM DAILY DOSE 50 UNITS, Disp: 30 mL, Rfl: 3  •  losartan-hydrochlorothiazide (HYZAAR) 100-25 MG per tablet, TAKE ONE TABLET BY MOUTH DAILY, Disp: 90 tablet, Rfl: 3  •  methocarbamol (ROBAXIN) 500 MG tablet, Take 500 mg by mouth As Needed., Disp: , Rfl:   •  MILK THISTLE PO, Take  by  "mouth Daily., Disp: , Rfl:   •  Multiple Vitamins-Minerals (Multivitamin Adult) chewable tablet, Chew., Disp: , Rfl:   •  naproxen (NAPROSYN) 375 MG tablet, Take 375 mg by mouth., Disp: , Rfl:   •  ONETOUCH VERIO test strip, USE TO TEST BLOOD SUGAR 6 TIMES DAILY, Disp: 600 each, Rfl: 3  •  triazolam (HALCION) 0.25 MG tablet, Take 1.5 tabs per night for sleep, Disp: 45 tablet, Rfl: 5    Vital Signs:    Vitals:    10/12/21 1300   BP: 155/87   Pulse: 98   SpO2: 97%   Weight: 59.9 kg (132 lb)   Height: 165.1 cm (65\")      Body mass index is 21.97 kg/m².  Neck Circumference: 12.5 inches      Physical Exam:   General Alert and oriented. No acute distress noted   Pharynx/Throat Class II/III Mallampati airway, large tongue, no evidence of redundant lateral pharyngeal tissue. No oral lesions. No thrush. Moist mucous membranes.   Head Normocephalic. Symmetrical. Atraumatic.    Nose No septal deviation. No drainage   Chest Wall Normal shape. Symmetric expansion with respiration. No tenderness.   Neck Trachea midline, no thyromegaly or adenopathy    Lungs Clear to auscultation bilaterally. No wheezes. No rhonchi. No rales. Respirations regular, even and unlabored.   Heart Regular rhythm and normal rate. Normal S1 and S2. No murmur   Abdomen Soft, non-tender and non-distended. Normal bowel sounds. No masses.   Extremities Moves all extremities well. No edema   Psychiatric Normal mood and affect.       Impression:  1. Hypersomnia    2. Insomnia, unspecified type    3. Non-restorative sleep    4. Sleep-related bruxism        Plan:    Good sleep hygiene measures should be maintained.    I discussed the pathophysiology of obstructive sleep apnea with the patient.  We discussed the adverse outcomes associated with untreated sleep-disordered breathing.  We discussed treatment modalities of obstructive sleep apnea including CPAP device as well as oral mandibular advancement device. Sleep study will be scheduled to establish definitive " diagnosis of sleep disorder breathing.  Weight loss will be strongly beneficial in order to reduce the severity of sleep-disordered breathing.  Patient has narrow oropharyngeal structure.  Caution during activities that require prolonged concentration is strongly advised.  Patient will be notified of sleep study results after sleep study is completed.  If sleep apnea is only mild,  oral mandibular advancement device may be one of the treatment options.  However if sleep apnea is moderately severe, CPAP treatment will be strongly encouraged.  The patient is not opposed to treatment with CPAP device if we confirm significant obstructive sleep apnea on polysomnography.    Follow-up home sleep study; consider CBT-I in the future as well.  Advised to continue care per following up with psychiatrist for anxiety as well.    Thank you for allowing me to participate in your patient's care.    Timbo Guerrero MD  Sleep Medicine  10/12/21  14:43 EDT

## 2021-10-15 ENCOUNTER — TELEPHONE (OUTPATIENT)
Dept: INTERNAL MEDICINE | Facility: CLINIC | Age: 56
End: 2021-10-15

## 2021-10-15 DIAGNOSIS — F41.9 ANXIETY: Primary | ICD-10-CM

## 2021-10-15 DIAGNOSIS — F51.04 PSYCHOPHYSIOLOGICAL INSOMNIA: ICD-10-CM

## 2021-10-15 NOTE — TELEPHONE ENCOUNTER
A new referral will have to be placed to behavioral health, as the previous referral was closed out due to patient declining at the time she was contacted.    Please advise.    Thanks,    Von

## 2021-10-19 ENCOUNTER — OFFICE VISIT (OUTPATIENT)
Dept: ENDOCRINOLOGY | Age: 56
End: 2021-10-19

## 2021-10-19 VITALS
BODY MASS INDEX: 21.99 KG/M2 | HEART RATE: 67 BPM | HEIGHT: 65 IN | SYSTOLIC BLOOD PRESSURE: 110 MMHG | WEIGHT: 132 LBS | DIASTOLIC BLOOD PRESSURE: 70 MMHG | OXYGEN SATURATION: 98 % | RESPIRATION RATE: 20 BRPM

## 2021-10-19 DIAGNOSIS — E78.2 HYPERLIPEMIA, MIXED: ICD-10-CM

## 2021-10-19 DIAGNOSIS — E10.43 TYPE 1 DIABETES MELLITUS WITH DIABETIC AUTONOMIC NEUROPATHY (HCC): Primary | ICD-10-CM

## 2021-10-19 PROCEDURE — 99214 OFFICE O/P EST MOD 30 MIN: CPT | Performed by: NURSE PRACTITIONER

## 2021-10-19 RX ORDER — GLUCAGON INJECTION, SOLUTION 1 MG/.2ML
1 INJECTION, SOLUTION SUBCUTANEOUS AS NEEDED
Qty: 0.4 ML | Refills: 1 | Status: SHIPPED | OUTPATIENT
Start: 2021-10-19

## 2021-11-01 ENCOUNTER — HOSPITAL ENCOUNTER (OUTPATIENT)
Dept: SLEEP MEDICINE | Facility: HOSPITAL | Age: 56
Discharge: HOME | End: 2021-11-01
Admitting: FAMILY MEDICINE

## 2021-11-01 DIAGNOSIS — G47.10 HYPERSOMNIA: ICD-10-CM

## 2021-11-01 DIAGNOSIS — G47.63 SLEEP-RELATED BRUXISM: ICD-10-CM

## 2021-11-01 DIAGNOSIS — G47.8 NON-RESTORATIVE SLEEP: ICD-10-CM

## 2021-11-01 DIAGNOSIS — G47.00 INSOMNIA, UNSPECIFIED TYPE: ICD-10-CM

## 2021-11-01 PROCEDURE — 95806 SLEEP STUDY UNATT&RESP EFFT: CPT

## 2021-11-01 PROCEDURE — 95806 SLEEP STUDY UNATT&RESP EFFT: CPT | Performed by: FAMILY MEDICINE

## 2021-11-12 ENCOUNTER — TELEPHONE (OUTPATIENT)
Dept: SLEEP MEDICINE | Facility: HOSPITAL | Age: 56
End: 2021-11-12

## 2021-11-19 DIAGNOSIS — G47.00 INSOMNIA, UNSPECIFIED TYPE: Primary | ICD-10-CM

## 2021-11-19 DIAGNOSIS — G47.8 NON-RESTORATIVE SLEEP: ICD-10-CM

## 2021-11-19 DIAGNOSIS — G47.10 HYPERSOMNIA: ICD-10-CM

## 2021-11-19 RX ORDER — TRAZODONE HYDROCHLORIDE 50 MG/1
TABLET ORAL
Qty: 45 TABLET | Refills: 1 | Status: SHIPPED | OUTPATIENT
Start: 2021-11-19 | End: 2022-01-05

## 2022-01-05 ENCOUNTER — OFFICE VISIT (OUTPATIENT)
Dept: SLEEP MEDICINE | Facility: HOSPITAL | Age: 57
End: 2022-01-05

## 2022-01-05 VITALS
HEART RATE: 83 BPM | OXYGEN SATURATION: 99 % | SYSTOLIC BLOOD PRESSURE: 135 MMHG | BODY MASS INDEX: 22.66 KG/M2 | HEIGHT: 65 IN | WEIGHT: 136 LBS | DIASTOLIC BLOOD PRESSURE: 79 MMHG

## 2022-01-05 DIAGNOSIS — G47.00 INSOMNIA, UNSPECIFIED TYPE: ICD-10-CM

## 2022-01-05 DIAGNOSIS — G47.8 NON-RESTORATIVE SLEEP: ICD-10-CM

## 2022-01-05 DIAGNOSIS — G47.10 HYPERSOMNIA: ICD-10-CM

## 2022-01-05 DIAGNOSIS — G47.33 OBSTRUCTIVE SLEEP APNEA: Primary | ICD-10-CM

## 2022-01-05 PROCEDURE — G0463 HOSPITAL OUTPT CLINIC VISIT: HCPCS

## 2022-01-05 PROCEDURE — 99214 OFFICE O/P EST MOD 30 MIN: CPT | Performed by: FAMILY MEDICINE

## 2022-01-05 RX ORDER — SUVOREXANT 10 MG/1
10 TABLET, FILM COATED ORAL
Qty: 30 TABLET | Refills: 1 | Status: SHIPPED | OUTPATIENT
Start: 2022-01-05 | End: 2022-02-07

## 2022-01-05 NOTE — PROGRESS NOTES
Follow Up [facility] Note     Chief Complaint:  BRANT     Primary Care Physician: Les Soto MD    Ivon Ortega is a 56 y.o.female  was last seen at [facility] sleep lab: 11/1/2021 for home sleep study.  Patient originally presented with concerns regarding insomnia.  She reported hypersomnia nonrestorative sleep.  Works from 4 AM to 12:30 PM.  Has been told she snores with morning headaches and dry mouth.  Home safety showed mild obstructive sleep apnea with overall AHI 6.6 and supine AHI of 8.5.  There was sleep-related hypoxia noted.  Presents today to discuss results and treatment options.  I had prescribed trazodone to her at the last visit.    Recommend oral mandibular device versus auto CPAP machine.  Also discussed considering CBT-I for insomnia.  CBT-I through Dr. Steiner was too expensive for patient she will consider apps.    Trazodone does not help much in terms of her insomnia.  Interested in other medications.    Cannot afford oral mandibular device at this time.  Does not want to try CPAP.  Found an over-the-counter oral mandibular device that she would like to try first instead.  Is working on improving sleep hygiene as well.        Current Medications:    Current Outpatient Medications:   •  atorvastatin (LIPITOR) 20 MG tablet, Take 1 tablet by mouth Daily., Disp: 90 tablet, Rfl: 3  •  B-D UF III MINI PEN NEEDLES 31G X 5 MM misc, USE TO INJECT INSULIN 4 TIMES DAILY, Disp: 400 each, Rfl: 3  •  Blood Pressure Monitoring (5 SERIES BP MONITOR) device, , Disp: , Rfl:   •  desvenlafaxine (PRISTIQ) 100 MG 24 hr tablet, TAKE ONE TABLET BY MOUTH DAILY, Disp: 30 tablet, Rfl: 10  •  Glucagon (Gvoke HypoPen 2-Pack) 1 MG/0.2ML solution auto-injector, Inject 1 mg under the skin into the appropriate area as directed As Needed (hypoglycemia)., Disp: 0.4 mL, Rfl: 1  •  glucose blood test strip, USE TO TEST BLOOD SUGAR 6 TIMES DAILY  ONE TOUCH ULTRA TEST STRIPS, Disp: 600 each, Rfl: 3  •  Ibandronate  Sodium (BONIVA PO), Take  by mouth Every 30 (Thirty) Days., Disp: , Rfl:   •  insulin aspart (NovoLOG FlexPen) 100 UNIT/ML solution pen-injector sc pen, INJECT 1 UNIT FOR 7 GRAMS FOR BREAKFAST, 1 UNIT FOR 6 GRAMS FOR LUNCH, 1 UNIT FOR 8 GRAMS FOR DINNER. MAXIMUM DAILY DOSE 50 UNITS, Disp: 45 mL, Rfl: 3  •  insulin degludec (Tresiba FlexTouch) 100 UNIT/ML solution pen-injector injection, INJECT 1 UNIT FOR 7 GRAMS FOR BREAKFAST, 1 UNIT FOR 6 GRAMS FOR LUNCH, 1 UNIT FOR 8 GRAMS FOR DINNER. MAXIMUM DAILY DOSE 50 UNITS, Disp: 30 mL, Rfl: 3  •  losartan-hydrochlorothiazide (HYZAAR) 100-25 MG per tablet, TAKE ONE TABLET BY MOUTH DAILY, Disp: 90 tablet, Rfl: 3  •  MILK THISTLE PO, Take  by mouth Daily., Disp: , Rfl:   •  Multiple Vitamins-Minerals (Multivitamin Adult) chewable tablet, Chew., Disp: , Rfl:   •  ONETOUCH VERIO test strip, USE TO TEST BLOOD SUGAR 6 TIMES DAILY, Disp: 600 each, Rfl: 3   also entered in Sleep Questionnaire    Patient  has a past medical history of Anxiety, Arthralgia of hip (2/10/2016), Arthritis, Diabetes mellitus (HCC), Diabetic ketoacidosis without coma associated with type 1 diabetes mellitus (HCC) (2017), Esophageal candidiasis (HCC), Hyperlipidemia, Hypertension, Insomnia, Pregnancy, and Thyroid disease.    Social History:    Social History     Socioeconomic History   • Marital status:    Tobacco Use   • Smoking status: Current Every Day Smoker     Packs/day: 0.25     Years: 5.00     Pack years: 1.25     Types: Cigarettes     Last attempt to quit: 1990     Years since quittin.0   • Smokeless tobacco: Never Used   Vaping Use   • Vaping Use: Never used   Substance and Sexual Activity   • Alcohol use: Yes   • Drug use: No   • Sexual activity: Defer       Allergies:  Ampicillin    Review of Systems:    A complete review of systems was done and all were negative with the exception of ANXIETY    Vital Signs:    Vitals:    22 1305   BP: 135/79   Pulse: 83   SpO2: 99%  "  Weight: 61.7 kg (136 lb)   Height: 165.1 cm (65\")     Body mass index is 22.63 kg/m².    Vital Signs /79   Pulse 83   Ht 165.1 cm (65\")   Wt 61.7 kg (136 lb)   SpO2 99%   BMI 22.63 kg/m²  Body mass index is 22.63 kg/m².    General Alert and oriented. No acute distress noted   Pharynx/Throat Class II/III Mallampati airway, large tongue, no evidence of redundant lateral pharyngeal tissue. No oral lesions. No thrush. Moist mucous membranes.   Head Normocephalic. Symmetrical. Atraumatic.    Nose No septal deviation. No drainage   Chest Wall Normal shape. Symmetric expansion with respiration. No tenderness.   Neck Trachea midline, no thyromegaly or adenopathy    Lungs Clear to auscultation bilaterally. No wheezes. No rhonchi. No rales. Respirations regular, even and unlabored.   Heart Regular rhythm and normal rate. Normal S1 and S2. No murmur   Abdomen Soft, non-tender and non-distended. Normal bowel sounds. No masses.   Extremities Moves all extremities well. No edema   Psychiatric Normal mood and affect.     Impression:  1. Obstructive sleep apnea    2. Hypersomnia    3. Non-restorative sleep    4. Insomnia, unspecified type        Discontinue trazodone.  Start Belsomra 10 mg nightly instead.  LFTs 6 months ago were normal.    Discussed considering CPAP; patient prefers over-the-counter oral mandibular device.    We will also consider CBT-I apps through her smart phone.  Return to clinic in 6 weeks for follow-up or sooner if needed.    Time spent during visit: 30 minutes of which at least 50% of the time was spent counseling patient.    Timbo Guerrero MD  Sleep Medicine  01/05/22  13:20 EST      "

## 2022-01-18 LAB
ALBUMIN SERPL-MCNC: 4.8 G/DL (ref 3.8–4.9)
ALBUMIN/CREAT UR: 14 MG/G CREAT (ref 0–29)
ALBUMIN/GLOB SERPL: 2.3 {RATIO} (ref 1.2–2.2)
ALP SERPL-CCNC: 120 IU/L (ref 44–121)
ALT SERPL-CCNC: 14 IU/L (ref 0–32)
AST SERPL-CCNC: 19 IU/L (ref 0–40)
BILIRUB SERPL-MCNC: <0.2 MG/DL (ref 0–1.2)
BUN SERPL-MCNC: 12 MG/DL (ref 6–24)
BUN/CREAT SERPL: 15 (ref 9–23)
CALCIUM SERPL-MCNC: 10.2 MG/DL (ref 8.7–10.2)
CHLORIDE SERPL-SCNC: 100 MMOL/L (ref 96–106)
CHOLEST SERPL-MCNC: 189 MG/DL (ref 100–199)
CO2 SERPL-SCNC: 26 MMOL/L (ref 20–29)
CREAT SERPL-MCNC: 0.79 MG/DL (ref 0.57–1)
CREAT UR-MCNC: 78.7 MG/DL
GLOBULIN SER CALC-MCNC: 2.1 G/DL (ref 1.5–4.5)
GLUCOSE SERPL-MCNC: 88 MG/DL (ref 65–99)
HBA1C MFR BLD: 7.3 % (ref 4.8–5.6)
HDLC SERPL-MCNC: 81 MG/DL
IMP & REVIEW OF LAB RESULTS: NORMAL
LDLC SERPL CALC-MCNC: 91 MG/DL (ref 0–99)
MICROALBUMIN UR-MCNC: 10.9 UG/ML
POTASSIUM SERPL-SCNC: 3.6 MMOL/L (ref 3.5–5.2)
PROT SERPL-MCNC: 6.9 G/DL (ref 6–8.5)
SODIUM SERPL-SCNC: 139 MMOL/L (ref 134–144)
TRIGL SERPL-MCNC: 95 MG/DL (ref 0–149)
VLDLC SERPL CALC-MCNC: 17 MG/DL (ref 5–40)

## 2022-01-26 RX ORDER — ATORVASTATIN CALCIUM 20 MG/1
TABLET, FILM COATED ORAL
Qty: 90 TABLET | Refills: 3 | Status: SHIPPED | OUTPATIENT
Start: 2022-01-26 | End: 2022-02-28

## 2022-01-29 DIAGNOSIS — G47.00 INSOMNIA, UNSPECIFIED TYPE: ICD-10-CM

## 2022-01-29 DIAGNOSIS — G47.8 NON-RESTORATIVE SLEEP: ICD-10-CM

## 2022-01-29 DIAGNOSIS — G47.10 HYPERSOMNIA: ICD-10-CM

## 2022-02-01 RX ORDER — TRAZODONE HYDROCHLORIDE 50 MG/1
TABLET ORAL
Qty: 45 TABLET | Refills: 1 | Status: SHIPPED | OUTPATIENT
Start: 2022-02-01 | End: 2022-02-07

## 2022-02-07 ENCOUNTER — OFFICE VISIT (OUTPATIENT)
Dept: PSYCHIATRY | Facility: CLINIC | Age: 57
End: 2022-02-07

## 2022-02-07 DIAGNOSIS — F33.0 MILD EPISODE OF RECURRENT MAJOR DEPRESSIVE DISORDER: Chronic | ICD-10-CM

## 2022-02-07 DIAGNOSIS — G47.00 INSOMNIA, UNSPECIFIED TYPE: ICD-10-CM

## 2022-02-07 DIAGNOSIS — F41.9 ANXIETY: Primary | Chronic | ICD-10-CM

## 2022-02-07 PROCEDURE — 90792 PSYCH DIAG EVAL W/MED SRVCS: CPT | Performed by: PHYSICIAN ASSISTANT

## 2022-02-07 RX ORDER — TRAZODONE HYDROCHLORIDE 100 MG/1
TABLET ORAL
Qty: 60 TABLET | Refills: 2 | Status: SHIPPED | OUTPATIENT
Start: 2022-02-07 | End: 2022-05-15

## 2022-02-07 NOTE — PROGRESS NOTES
Subjective   Ivon Ortega is a 56 y.o. female who presents today for initial evaluation      This provider is located at [place] , using a secure [email or web address] Video Visit through [employer]. Patient is being seen remotely via telehealth at Behavioral Medicine office in [place] , and stated they are in a secure environment for this session. The patient's condition being diagnosed/treated is appropriate for telemedicine. The provider identified herself as well as her credentials. The patient, and/or patients guardian, consent to be seen remotely, and when consent is given they understand that the consent allows for patient identifiable information to be sent to a third party as needed. They may refuse to be seen remotely at any time. The electronic data is encrypted and password protected, and the patient and/or guardian has been advised of the potential risks to privacy not withstanding such measures.    Chief Complaint:  Anxiety/Depression/Insomnia    History of Present Illness:   Dr Les Soto at [facility] referred here  She is on desvenlaxafine 100 mg daily for a while per Dr. Soto   He referred her for sleep study () because not sleeping, has mild sleep apnea, insurance won't cover the apnea mouth appliance  CBT recommended  Dr. Guerrero has her on 50 mg tabs of Trazdone, taking 75 mg right now, not helping, only gets a few hours of sleep, makes her mood worse  Gets up at 2:15 am for [place] [employer] goes to bed about 7pm  Lives alone  Depression 2/10  Anxiety 6/10  Denies SI/HI  Dr Guerrero prescribed Belsomra first but was too expensive and didn't  the Rx  Has taken Ambien in the past but a previous MD took her off of it.     PMH: Diabetes, HTN, HLD  Adopted, biological father committed suicide when she was very young.      The following portions of the patient's history were reviewed and updated as appropriate: allergies, current medications, past family history, past medical history,  past social history, past surgical history and problem list.    PAST PSYCHIATRIC HISTORY  Axis I  Affective/Bipoloar Disorder, Anxiety/Panic Disorder  Axis II  None    PAST OUTPATIENT TREATMENT  Diagnosis treated:  Affective Disorder, Anxiety/Panic Disorder  Treatment Type:  Medication Management  Prior Psychiatric Medications:  Duloxetine  Ambien CR  Halcion  Paxil  Pristiq  Trazodone  Support Groups:  None  Sequelae Of Mental Disorder:  emotional distress      Interval History  No Change    Side Effects  None      Past Medical History:  Past Medical History:   Diagnosis Date   • Anxiety    • Arthralgia of hip 2/10/2016   • Arthritis    • Depression    • Diabetes mellitus (HCC)    • Diabetic ketoacidosis without coma associated with type 1 diabetes mellitus (HCC) 2017   • Esophageal candidiasis (HCC)    • Hyperlipidemia    • Hypertension    • Insomnia    • Pregnancy        • Thyroid disease        Social History:  Social History     Socioeconomic History   • Marital status:    Tobacco Use   • Smoking status: Current Every Day Smoker     Packs/day: 0.25     Years: 5.00     Pack years: 1.25     Types: Cigarettes     Last attempt to quit: 1990     Years since quittin.1   • Smokeless tobacco: Never Used   Vaping Use   • Vaping Use: Never used   Substance and Sexual Activity   • Alcohol use: Yes   • Drug use: No   • Sexual activity: Defer       Family History:  Family History   Adopted: Yes   Problem Relation Age of Onset   • Suicide Attempts Father        Past Surgical History:  Past Surgical History:   Procedure Laterality Date   • TUBAL ABDOMINAL LIGATION         Problem List:  Patient Active Problem List   Diagnosis   • Anxiety   • Mixed hyperlipidemia   • Essential hypertension   • Insomnia   • Type 1 diabetes mellitus (HCC)   • Fatty infiltration of liver   • Insulin pump status   • Health care maintenance   • Cervicalgia   • Degenerative disc disease, cervical   • Osteoporosis,  postmenopausal   • Chest pain   • Hypokalemia   • Long-term current use of benzodiazepine   • Obstructive sleep apnea   • Depression       Allergy:   Allergies   Allergen Reactions   • Ampicillin Diarrhea        Discontinued Medications:  Medications Discontinued During This Encounter   Medication Reason   • Suvorexant (Belsomra) 10 MG tablet Cost of medication   • traZODone (DESYREL) 50 MG tablet        Current Medications:   Current Outpatient Medications   Medication Sig Dispense Refill   • atorvastatin (LIPITOR) 20 MG tablet TAKE ONE TABLET BY MOUTH DAILY 90 tablet 3   • B-D UF III MINI PEN NEEDLES 31G X 5 MM misc USE TO INJECT INSULIN 4 TIMES DAILY 400 each 3   • Blood Pressure Monitoring (5 SERIES BP MONITOR) device      • desvenlafaxine (PRISTIQ) 100 MG 24 hr tablet TAKE ONE TABLET BY MOUTH DAILY 30 tablet 10   • Glucagon (Gvoke HypoPen 2-Pack) 1 MG/0.2ML solution auto-injector Inject 1 mg under the skin into the appropriate area as directed As Needed (hypoglycemia). 0.4 mL 1   • glucose blood test strip USE TO TEST BLOOD SUGAR 6 TIMES DAILY  ONE TOUCH ULTRA TEST STRIPS 600 each 3   • Ibandronate Sodium (BONIVA PO) Take  by mouth Every 30 (Thirty) Days.     • insulin aspart (NovoLOG FlexPen) 100 UNIT/ML solution pen-injector sc pen INJECT 1 UNIT FOR 7 GRAMS FOR BREAKFAST, 1 UNIT FOR 6 GRAMS FOR LUNCH, 1 UNIT FOR 8 GRAMS FOR DINNER. MAXIMUM DAILY DOSE 50 UNITS 45 mL 3   • insulin degludec (Tresiba FlexTouch) 100 UNIT/ML solution pen-injector injection INJECT 1 UNIT FOR 7 GRAMS FOR BREAKFAST, 1 UNIT FOR 6 GRAMS FOR LUNCH, 1 UNIT FOR 8 GRAMS FOR DINNER. MAXIMUM DAILY DOSE 50 UNITS 30 mL 3   • losartan-hydrochlorothiazide (HYZAAR) 100-25 MG per tablet TAKE ONE TABLET BY MOUTH DAILY 90 tablet 3   • MILK THISTLE PO Take  by mouth Daily.     • Multiple Vitamins-Minerals (Multivitamin Adult) chewable tablet Chew.     • ONETOUCH VERIO test strip USE TO TEST BLOOD SUGAR 6 TIMES DAILY 600 each 3   • traZODone (DESYREL)  100 MG tablet Take one or two tablets at bedtime for sleep 60 tablet 2     No current facility-administered medications for this visit.         Psychological ROS: positive for - anxiety, depression and sleep disturbances  negative for - behavioral disorder, concentration difficulties, decreased libido, disorientation, hallucinations, hostility, irritability, memory difficulties, mood swings, obsessive thoughts, physical abuse or sexual abuse      Physical Exam:   There were no vitals taken for this visit.    Mental Status Exam:   Hygiene:   good  Cooperation:  Cooperative  Eye Contact:  Good  Psychomotor Behavior:  Appropriate  Affect:  Appropriate  Mood: anxious  Hopelessness: Denies  Speech:  Normal  Thought Process:  Goal directed  Thought Content:  Normal  Suicidal:  None  Homicidal:  None  Hallucinations:  None  Delusion:  None  Memory:  Intact  Orientation:  Person, Place, Time and Situation  Reliability:  good  Insight:  Good  Judgement:  Good  Impulse Control:  Good  Physical/Medical Issues:  Yes Diabetes, HTN       PHQ-9 Depression Screening  Little interest or pleasure in doing things? 0   Feeling down, depressed, or hopeless? 1   Trouble falling or staying asleep, or sleeping too much? 3   Feeling tired or having little energy? 2   Poor appetite or overeating? 0   Feeling bad about yourself - or that you are a failure or have let yourself or your family down? 0   Trouble concentrating on things, such as reading the newspaper or watching television? 1   Moving or speaking so slowly that other people could have noticed? Or the opposite - being so fidgety or restless that you have been moving around a lot more than usual? 0   Thoughts that you would be better off dead, or of hurting yourself in some way? 0   PHQ-9 Total Score 7   If you checked off any problems, how difficult have these problems made it for you to do your work, take care of things at home, or get along with other people? Somewhat difficult            Current every day smoker less than 3 minutes spent counseling Has reduced Tobbacos use    I advised Ivon of the risks of tobacco use.     Lab Results:   No visits with results within 3 Month(s) from this visit.   Latest known visit with results is:   Office Visit on 10/19/2021   Component Date Value Ref Range Status   • Hemoglobin A1C 01/17/2022 7.3* 4.8 - 5.6 % Final    Comment:          Prediabetes: 5.7 - 6.4           Diabetes: >6.4           Glycemic control for adults with diabetes: <7.0     • Glucose 01/17/2022 88  65 - 99 mg/dL Final   • BUN 01/17/2022 12  6 - 24 mg/dL Final   • Creatinine 01/17/2022 0.79  0.57 - 1.00 mg/dL Final   • eGFR Non  Am 01/17/2022 83  >59 mL/min/1.73 Final   • eGFR African Am 01/17/2022 95  >59 mL/min/1.73 Final    Comment: **In accordance with recommendations from the NKF-ASN Task force,**    [facility] the [facility] eGFR calculation to the    2021 CKD-EPI creatinine equation that estimates kidney function    without a race variable.     • BUN/Creatinine Ratio 01/17/2022 15  9 - 23 Final   • Sodium 01/17/2022 139  134 - 144 mmol/L Final   • Potassium 01/17/2022 3.6  3.5 - 5.2 mmol/L Final   • Chloride 01/17/2022 100  96 - 106 mmol/L Final   • Total CO2 01/17/2022 26  20 - 29 mmol/L Final   • Calcium 01/17/2022 10.2  8.7 - 10.2 mg/dL Final   • Total Protein 01/17/2022 6.9  6.0 - 8.5 g/dL Final   • Albumin 01/17/2022 4.8  3.8 - 4.9 g/dL Final   • Globulin 01/17/2022 2.1  1.5 - 4.5 g/dL Final   • A/G Ratio 01/17/2022 2.3* 1.2 - 2.2 Final   • Total Bilirubin 01/17/2022 <0.2  0.0 - 1.2 mg/dL Final   • Alkaline Phosphatase 01/17/2022 120  44 - 121 IU/L Final                  **Please note reference interval change**   • AST (SGOT) 01/17/2022 19  0 - 40 IU/L Final   • ALT (SGPT) 01/17/2022 14  0 - 32 IU/L Final   • Total Cholesterol 01/17/2022 189  100 - 199 mg/dL Final   • Triglycerides 01/17/2022 95  0 - 149 mg/dL Final   • HDL Cholesterol  01/17/2022 81  >39 mg/dL Final   • VLDL Cholesterol Anthony 01/17/2022 17  5 - 40 mg/dL Final   • LDL Chol Calc ([facility]) 01/17/2022 91  0 - 99 mg/dL Final   • Creatinine, Urine 01/17/2022 78.7  Not Estab. mg/dL Final   • Microalbumin, Urine 01/17/2022 10.9  Not Estab. ug/mL Final   • Microalbumin/Creatinine Ratio 01/17/2022 14  0 - 29 mg/g creat Final    Comment:                        Normal:                0 -  29                         Moderately increased: 30 - 300                         Severely increased:       >300     • Interpretation 01/17/2022 Note   Final    Supplemental report is available.       Assessment/Plan   Problems Addressed this Visit        Mental Health    Anxiety - Primary (Chronic)    Depression (Chronic)    Relevant Medications    traZODone (DESYREL) 100 MG tablet       Sleep    Insomnia (Chronic)    Relevant Medications    traZODone (DESYREL) 100 MG tablet      Diagnoses       Codes Comments    Anxiety    -  Primary ICD-10-CM: F41.9  ICD-9-CM: 300.00     Insomnia, unspecified type     ICD-10-CM: G47.00  ICD-9-CM: 780.52     Mild episode of recurrent major depressive disorder (HCC)     ICD-10-CM: F33.0  ICD-9-CM: 296.31           Visit Diagnoses:    ICD-10-CM ICD-9-CM   1. Anxiety  F41.9 300.00   2. Insomnia, unspecified type  G47.00 780.52   3. Mild episode of recurrent major depressive disorder (HCC)  F33.0 296.31       TREATMENT PLAN/GOALS: Continue supportive psychotherapy efforts and medications as indicated. Treatment and medication options discussed during today's visit. Patient ackowledged and verbally consented to continue with current treatment plan and was educated on the importance of compliance with treatment and follow-up appointments.    MEDICATION ISSUES:  INSPECT reviewed as expected  Discussed medication options and treatment plan of prescribed medication as well as the risks, benefits, and side effects including potential falls, possible impaired driving and metabolic  adversities among others. Patient is agreeable to call the office with any worsening of symptoms or onset of side effects. Patient is agreeable to call 911 or go to the nearest ER should he/she begin having SI/HI. No medication side effects or related complaints today.     Continue Pristiq 100 mg daily for depression and anxiety  Increase Trazodone to 100mg tab, take one or two at bedtime for sleep  May add low dose Ambien 5mg if needed  Discussed doing Genes[employer] testing.     MEDS ORDERED DURING VISIT:  New Medications Ordered This Visit   Medications   • traZODone (DESYREL) 100 MG tablet     Sig: Take one or two tablets at bedtime for sleep     Dispense:  60 tablet     Refill:  2       Return in about 2 months (around 4/7/2022).         This document has been electronically signed by Tracy Conti PA-C  February 8, 2022 09:36 EST    Part of this note may be an electronic transcription/translation of spoken language to printed text using the Dragon Dictation System.

## 2022-02-11 NOTE — PROGRESS NOTES
I have reviewed the notes, assessments, and/or procedures performed by Tarcy Conti , I concur with her/his documentation of Ivon Ortega.

## 2022-02-16 ENCOUNTER — APPOINTMENT (OUTPATIENT)
Dept: SLEEP MEDICINE | Facility: HOSPITAL | Age: 57
End: 2022-02-16

## 2022-02-28 ENCOUNTER — OFFICE VISIT (OUTPATIENT)
Dept: INTERNAL MEDICINE | Facility: CLINIC | Age: 57
End: 2022-02-28

## 2022-02-28 VITALS
WEIGHT: 138.6 LBS | DIASTOLIC BLOOD PRESSURE: 80 MMHG | OXYGEN SATURATION: 98 % | SYSTOLIC BLOOD PRESSURE: 134 MMHG | BODY MASS INDEX: 23.09 KG/M2 | TEMPERATURE: 97.3 F | HEART RATE: 84 BPM | HEIGHT: 65 IN

## 2022-02-28 DIAGNOSIS — E78.2 MIXED HYPERLIPIDEMIA: ICD-10-CM

## 2022-02-28 DIAGNOSIS — F41.9 ANXIETY: ICD-10-CM

## 2022-02-28 DIAGNOSIS — F51.04 PSYCHOPHYSIOLOGICAL INSOMNIA: ICD-10-CM

## 2022-02-28 DIAGNOSIS — I10 ESSENTIAL HYPERTENSION: Primary | ICD-10-CM

## 2022-02-28 PROCEDURE — 99214 OFFICE O/P EST MOD 30 MIN: CPT | Performed by: FAMILY MEDICINE

## 2022-02-28 NOTE — PROGRESS NOTES
"Chief Complaint  Anxiety (follow up )    Subjective          Ivon Ortega presents to [facility]  History of Present Illness     Hypertension - stable.  Patient taking medication as prescribed.  Denies chest pain, shortness of breath, headache, lower extremity edema.  Patient is taking losartan-HCTZ 100-25 mg daily.    HLD-stable but patient stopped taking atorvastatin 20 mg daily as prescribed to see how her labs do.  Trying to adhere to a balanced diet.     Anxiety disorder-stable.  Patient is taking Pristiq 100 mg daily.  The patient is not having any side effects.  Denies depression, poor concentration, fatigue, irritability.  Looks like she established with behavioral health to establish and was also prescribed trazodone 100 mg 1 to 2 tablets daily for sleep.  She will follow with her again in April.      She is an insulin-dependent diabetic following along with Dr. Zuniga.  A1c has been around 7.3.    Reviewed labs from January 17, 2022 as below.    Objective   Vital Signs:   /80 (BP Location: Left arm, Patient Position: Sitting, Cuff Size: Adult)   Pulse 84   Temp 97.3 °F (36.3 °C) (Temporal)   Ht 165.1 cm (65\")   Wt 62.9 kg (138 lb 9.6 oz)   SpO2 98%   BMI 23.06 kg/m²     Physical Exam  Vitals and nursing note reviewed.   Constitutional:       General: She is not in acute distress.     Appearance: Normal appearance.   Cardiovascular:      Rate and Rhythm: Normal rate and regular rhythm.      Heart sounds: Normal heart sounds. No murmur heard.      Pulmonary:      Effort: Pulmonary effort is normal.      Breath sounds: Normal breath sounds.   Neurological:      Mental Status: She is alert.   Psychiatric:         Attention and Perception: Attention and perception normal.         Mood and Affect: Mood and affect normal.         Speech: Speech normal.         Behavior: Behavior normal. Behavior is cooperative.         Thought Content: Thought content normal.         " Judgment: Judgment normal.        Result Review :   The following data was reviewed by: Les Soto MD on 02/28/2022:  Common labs    Common Labsle 10/28/21 10/28/21 11/18/21 1/17/22 1/17/22 1/17/22 1/17/22    1307 1307  1430 1430 1430 1430   Glucose     88     BUN     12     Creatinine     0.79     eGFR Non  Am     83     eGFR African Am     95     Sodium     139     Potassium   3.7  3.6     Chloride     100     Calcium     10.2     Total Protein     6.9     Albumin     4.8     Total Bilirubin     <0.2     Alkaline Phosphatase     120     AST (SGOT)     19     ALT (SGPT)     14     WBC 7.21         Hemoglobin 13.0         Hematocrit 38.8         Platelets 331         Total Cholesterol      189    Triglycerides      95    HDL Cholesterol      81    LDL Cholesterol       91    Hemoglobin A1C  7.4 (A)  7.3 (A)      Microalbumin, Urine       10.9   (A) Abnormal value       Comments are available for some flowsheets but are not being displayed.                     Assessment and Plan    Diagnoses and all orders for this visit:    1. Essential hypertension (Primary)    2. Anxiety    3. Psychophysiological insomnia    4. Mixed hyperlipidemia       Everything appears to be stable.  Continue current medications as above.  Reviewed labs from endocrinology and can use those.      Follow Up {Instructions Charge Capture  Follow-up Communications :23}  Return in about 6 months (around 9/1/2022) for Annual physical.  Patient was given instructions and counseling regarding her condition or for health maintenance advice. Please see specific information pulled into the AVS if appropriate.

## 2022-03-01 ENCOUNTER — TELEPHONE (OUTPATIENT)
Dept: PSYCHIATRY | Facility: CLINIC | Age: 57
End: 2022-03-01

## 2022-03-01 DIAGNOSIS — F51.04 PSYCHOPHYSIOLOGICAL INSOMNIA: Primary | ICD-10-CM

## 2022-03-01 RX ORDER — ZOLPIDEM TARTRATE 5 MG/1
5 TABLET ORAL NIGHTLY PRN
Qty: 30 TABLET | Refills: 0 | Status: SHIPPED | OUTPATIENT
Start: 2022-03-01 | End: 2022-03-31

## 2022-03-01 NOTE — TELEPHONE ENCOUNTER
I thought that I had already addressed this.  Continue trazodone 150-200 milligrams at bedtime for sleep, plus I sent a prescription of Ambien 5 mg at bedtime to take also, sent to her [facility] pharmacy.

## 2022-03-01 NOTE — TELEPHONE ENCOUNTER
Per carline patient reported yesterday Patient called back to report in on her trazadone increase.  She is taking 150-200mg but is still waking up around 11-1130pm and is unable to fall back to sleep.  She normally has to get up for work at 230a which is making it difficult for her. Please advise

## 2022-03-07 ENCOUNTER — APPOINTMENT (OUTPATIENT)
Dept: WOMENS IMAGING | Facility: HOSPITAL | Age: 57
End: 2022-03-07

## 2022-03-07 PROCEDURE — 77067 SCR MAMMO BI INCL CAD: CPT | Performed by: RADIOLOGY

## 2022-03-07 PROCEDURE — 77063 BREAST TOMOSYNTHESIS BI: CPT | Performed by: RADIOLOGY

## 2022-03-31 DIAGNOSIS — F51.04 PSYCHOPHYSIOLOGICAL INSOMNIA: ICD-10-CM

## 2022-03-31 RX ORDER — ZOLPIDEM TARTRATE 5 MG/1
TABLET ORAL
Qty: 30 TABLET | Refills: 0 | Status: SHIPPED | OUTPATIENT
Start: 2022-03-31 | End: 2022-04-18

## 2022-04-18 ENCOUNTER — OFFICE VISIT (OUTPATIENT)
Dept: PSYCHIATRY | Facility: CLINIC | Age: 57
End: 2022-04-18

## 2022-04-18 ENCOUNTER — OFFICE VISIT (OUTPATIENT)
Dept: ENDOCRINOLOGY | Age: 57
End: 2022-04-18

## 2022-04-18 VITALS
OXYGEN SATURATION: 98 % | SYSTOLIC BLOOD PRESSURE: 125 MMHG | DIASTOLIC BLOOD PRESSURE: 84 MMHG | HEART RATE: 92 BPM | WEIGHT: 136.8 LBS | HEIGHT: 65 IN | BODY MASS INDEX: 22.79 KG/M2

## 2022-04-18 DIAGNOSIS — Z79.4 LONG-TERM INSULIN USE: ICD-10-CM

## 2022-04-18 DIAGNOSIS — E10.43 TYPE 1 DIABETES MELLITUS WITH DIABETIC AUTONOMIC NEUROPATHY: Primary | ICD-10-CM

## 2022-04-18 DIAGNOSIS — F41.1 GAD (GENERALIZED ANXIETY DISORDER): Chronic | ICD-10-CM

## 2022-04-18 DIAGNOSIS — F33.0 MILD EPISODE OF RECURRENT MAJOR DEPRESSIVE DISORDER: Chronic | ICD-10-CM

## 2022-04-18 DIAGNOSIS — E78.2 HYPERLIPEMIA, MIXED: ICD-10-CM

## 2022-04-18 DIAGNOSIS — F51.04 PSYCHOPHYSIOLOGICAL INSOMNIA: Primary | Chronic | ICD-10-CM

## 2022-04-18 PROCEDURE — 99214 OFFICE O/P EST MOD 30 MIN: CPT | Performed by: PHYSICIAN ASSISTANT

## 2022-04-18 PROCEDURE — 99214 OFFICE O/P EST MOD 30 MIN: CPT | Performed by: INTERNAL MEDICINE

## 2022-04-18 RX ORDER — ZOLPIDEM TARTRATE 10 MG/1
10 TABLET ORAL NIGHTLY PRN
Qty: 30 TABLET | Refills: 2 | Status: SHIPPED | OUTPATIENT
Start: 2022-04-18 | End: 2022-08-21

## 2022-04-18 NOTE — PROGRESS NOTES
Subjective   Ivon Ortega is a 57 y.o. female who presents today for follow up in the office      Chief Complaint:  Anxiety/Depression/Insomnia    History of Present Illness:   Dr Les Soto at [facility] referred here  She is on desvenlaxafine 100 mg daily for a while per Dr. Soto   Never sleeps all night, problem for many years  He referred her for sleep study () because not sleeping, has mild sleep apnea, insurance won't cover the apnea mouth appliance  CBT recommended  Sleeping some better with Trazodone 100 mg right now plus 5mg of Ambien, hangover with higher doses of Trazodone.   Gets up at 2:15 am for work, has to [place] at work at [place] ([place] [employer] goes to bed about 7pm, going part-time this month  Depression 6/10, feeling more down recently, lives alone and feels overwhelmed taking care of things  Feels lonely, has two sons and one grandson, wishes she could see them more.  Anxiety 5/10  Denies SI/HI  Dr Guerrero prescribed Belsomra first but was too expensive and didn't  the Rx     PMH: Diabetes, HTN, HLD  Adopted, biological father committed suicide when she was very young.      The following portions of the patient's history were reviewed and updated as appropriate: allergies, current medications, past family history, past medical history, past social history, past surgical history and problem list.    PAST PSYCHIATRIC HISTORY  Axis I  Affective/Bipoloar Disorder, Anxiety/Panic Disorder  Axis II  None    PAST OUTPATIENT TREATMENT  Diagnosis treated:  Affective Disorder, Anxiety/Panic Disorder  Treatment Type:  Medication Management  Prior Psychiatric Medications:  Duloxetine  Ambien CR  Halcion  Paxil  Pristiq  Trazodone  Ambien  Belsomra never tried b/c too expensive  Support Groups:  None  Sequelae Of Mental Disorder:  emotional distress      Interval History  No Change    Side Effects  None    Past Psychiatric Hx was reviewed and compared to 2/7/22 visit and appropriate  updates were made.    Past Medical History:  Past Medical History:   Diagnosis Date   • Arthralgia of hip 2/10/2016   • Arthritis    • Depression    • Diabetes mellitus (HCC)    • Diabetic ketoacidosis without coma associated with type 1 diabetes mellitus (HCC) 2017   • Esophageal candidiasis (HCC)    • Hyperlipidemia    • Hypertension    • Insomnia    • Pregnancy        • Thyroid disease        Social History:  Social History     Socioeconomic History   • Marital status:    Tobacco Use   • Smoking status: Current Every Day Smoker     Packs/day: 0.25     Years: 5.00     Pack years: 1.25     Types: Cigarettes     Last attempt to quit:      Years since quittin.3   • Smokeless tobacco: Never Used   Vaping Use   • Vaping Use: Never used   Substance and Sexual Activity   • Alcohol use: Yes   • Drug use: No   • Sexual activity: Defer       Family History:  Family History   Adopted: Yes   Problem Relation Age of Onset   • Suicide Attempts Father        Past Surgical History:  Past Surgical History:   Procedure Laterality Date   • TUBAL ABDOMINAL LIGATION         Problem List:  Patient Active Problem List   Diagnosis   • LAVINIA (generalized anxiety disorder)   • Mixed hyperlipidemia   • Essential hypertension   • Insomnia   • Type 1 diabetes mellitus (HCC)   • Fatty infiltration of liver   • Insulin pump status   • Health care maintenance   • Cervicalgia   • Degenerative disc disease, cervical   • Osteoporosis, postmenopausal   • Chest pain   • Hypokalemia   • Long-term current use of benzodiazepine   • Obstructive sleep apnea   • Depression       Allergy:   Allergies   Allergen Reactions   • Ampicillin Diarrhea        Discontinued Medications:  Medications Discontinued During This Encounter   Medication Reason   • zolpidem (AMBIEN) 5 MG tablet        Current Medications:   Current Outpatient Medications   Medication Sig Dispense Refill   • zolpidem (AMBIEN) 10 MG tablet Take 1 tablet by  mouth At Night As Needed for Sleep. 30 tablet 2   • B-D UF III MINI PEN NEEDLES 31G X 5 MM misc USE TO INJECT INSULIN 4 TIMES DAILY 400 each 3   • Blood Pressure Monitoring (5 SERIES BP MONITOR) device      • desvenlafaxine (PRISTIQ) 100 MG 24 hr tablet TAKE ONE TABLET BY MOUTH DAILY 30 tablet 10   • Glucagon (Gvoke HypoPen 2-Pack) 1 MG/0.2ML solution auto-injector Inject 1 mg under the skin into the appropriate area as directed As Needed (hypoglycemia). 0.4 mL 1   • glucose blood test strip USE TO TEST BLOOD SUGAR 6 TIMES DAILY  ONE TOUCH ULTRA TEST STRIPS 600 each 3   • Ibandronate Sodium (BONIVA PO) Take  by mouth Every 30 (Thirty) Days.     • insulin aspart (NovoLOG FlexPen) 100 UNIT/ML solution pen-injector sc pen INJECT 1 UNIT FOR 7 GRAMS FOR BREAKFAST, 1 UNIT FOR 6 GRAMS FOR LUNCH, 1 UNIT FOR 8 GRAMS FOR DINNER. MAXIMUM DAILY DOSE 50 UNITS 45 mL 3   • insulin degludec (Tresiba FlexTouch) 100 UNIT/ML solution pen-injector injection INJECT 1 UNIT FOR 7 GRAMS FOR BREAKFAST, 1 UNIT FOR 6 GRAMS FOR LUNCH, 1 UNIT FOR 8 GRAMS FOR DINNER. MAXIMUM DAILY DOSE 50 UNITS 30 mL 3   • losartan-hydrochlorothiazide (HYZAAR) 100-25 MG per tablet TAKE ONE TABLET BY MOUTH DAILY 90 tablet 3   • MILK THISTLE PO Take  by mouth Daily.     • Multiple Vitamins-Minerals (Multivitamin Adult) chewable tablet Chew.     • ONETOUCH VERIO test strip USE TO TEST BLOOD SUGAR 6 TIMES DAILY 600 each 3   • traZODone (DESYREL) 100 MG tablet Take one or two tablets at bedtime for sleep 60 tablet 2     No current facility-administered medications for this visit.         Psychological ROS: positive for - anxiety, depression and sleep disturbances  negative for - behavioral disorder, concentration difficulties, decreased libido, disorientation, hallucinations, hostility, irritability, memory difficulties, mood swings, obsessive thoughts, physical abuse or sexual abuse      Physical Exam:   There were no vitals taken for this visit.    Mental Status  Exam:   Hygiene:   good  Cooperation:  Cooperative  Eye Contact:  Good  Psychomotor Behavior:  Appropriate  Affect:  Appropriate  Mood: anxious  Hopelessness: Denies  Speech:  Normal  Thought Process:  Goal directed  Thought Content:  Normal  Suicidal:  None  Homicidal:  None  Hallucinations:  None  Delusion:  None  Memory:  Intact  Orientation:  Person, Place, Time and Situation  Reliability:  good  Insight:  Good  Judgement:  Good  Impulse Control:  Good  Physical/Medical Issues:  Yes Diabetes, HTN     Mental Status Exam was reviewed and compared to 2/7/22 visit and appropriate updates were made.    PHQ-9 Depression Screening  Little interest or pleasure in doing things?     Feeling down, depressed, or hopeless?     Trouble falling or staying asleep, or sleeping too much?     Feeling tired or having little energy?     Poor appetite or overeating?     Feeling bad about yourself - or that you are a failure or have let yourself or your family down?     Trouble concentrating on things, such as reading the newspaper or watching television?     Moving or speaking so slowly that other people could have noticed? Or the opposite - being so fidgety or restless that you have been moving around a lot more than usual?     Thoughts that you would be better off dead, or of hurting yourself in some way?     PHQ-9 Total Score  11   If you checked off any problems, how difficult have these problems made it for you to do your work, take care of things at home, or get along with other people?  Very difficult           Current every day smoker less than 3 minutes spent counseling Has reduced Tobbacos use    I advised Ivon of the risks of tobacco use.     Lab Results:   No visits with results within 3 Month(s) from this visit.   Latest known visit with results is:   Office Visit on 10/19/2021   Component Date Value Ref Range Status   • Hemoglobin A1C 01/17/2022 7.3 (A) 4.8 - 5.6 % Final    Comment:          Prediabetes: 5.7 - 6.4            Diabetes: >6.4           Glycemic control for adults with diabetes: <7.0     • Glucose 01/17/2022 88  65 - 99 mg/dL Final   • BUN 01/17/2022 12  6 - 24 mg/dL Final   • Creatinine 01/17/2022 0.79  0.57 - 1.00 mg/dL Final   • eGFR Non  Am 01/17/2022 83  >59 mL/min/1.73 Final   • eGFR African Am 01/17/2022 95  >59 mL/min/1.73 Final    Comment: **In accordance with recommendations from the NKF-ASN Task force,**    [facility] is in the process of updating its eGFR calculation to the    2021 CKD-EPI creatinine equation that estimates kidney function    without a race variable.     • BUN/Creatinine Ratio 01/17/2022 15  9 - 23 Final   • Sodium 01/17/2022 139  134 - 144 mmol/L Final   • Potassium 01/17/2022 3.6  3.5 - 5.2 mmol/L Final   • Chloride 01/17/2022 100  96 - 106 mmol/L Final   • Total CO2 01/17/2022 26  20 - 29 mmol/L Final   • Calcium 01/17/2022 10.2  8.7 - 10.2 mg/dL Final   • Total Protein 01/17/2022 6.9  6.0 - 8.5 g/dL Final   • Albumin 01/17/2022 4.8  3.8 - 4.9 g/dL Final   • Globulin 01/17/2022 2.1  1.5 - 4.5 g/dL Final   • A/G Ratio 01/17/2022 2.3 (A) 1.2 - 2.2 Final   • Total Bilirubin 01/17/2022 <0.2  0.0 - 1.2 mg/dL Final   • Alkaline Phosphatase 01/17/2022 120  44 - 121 IU/L Final                  **Please note reference interval change**   • AST (SGOT) 01/17/2022 19  0 - 40 IU/L Final   • ALT (SGPT) 01/17/2022 14  0 - 32 IU/L Final   • Total Cholesterol 01/17/2022 189  100 - 199 mg/dL Final   • Triglycerides 01/17/2022 95  0 - 149 mg/dL Final   • HDL Cholesterol 01/17/2022 81  >39 mg/dL Final   • VLDL Cholesterol Anthony 01/17/2022 17  5 - 40 mg/dL Final   • LDL Chol Calc ([facility]) 01/17/2022 91  0 - 99 mg/dL Final   • Creatinine, Urine 01/17/2022 78.7  Not Estab. mg/dL Final   • Microalbumin, Urine 01/17/2022 10.9  Not Estab. ug/mL Final   • Microalbumin/Creatinine Ratio 01/17/2022 14  0 - 29 mg/g creat Final    Comment:                        Normal:                0 -  29                          Moderately increased: 30 - 300                         Severely increased:       >300     • Interpretation 01/17/2022 Note   Final    Supplemental report is available.       Assessment/Plan   Problems Addressed this Visit        Mental Health    LAVINIA (generalized anxiety disorder) (Chronic)    Relevant Medications    zolpidem (AMBIEN) 10 MG tablet    Depression (Chronic)    Relevant Medications    zolpidem (AMBIEN) 10 MG tablet       Sleep    Insomnia - Primary (Chronic)    Relevant Medications    zolpidem (AMBIEN) 10 MG tablet      Diagnoses       Codes Comments    Psychophysiological insomnia    -  Primary ICD-10-CM: F51.04  ICD-9-CM: 307.42     Mild episode of recurrent major depressive disorder (HCC)     ICD-10-CM: F33.0  ICD-9-CM: 296.31     LAVINIA (generalized anxiety disorder)     ICD-10-CM: F41.1  ICD-9-CM: 300.02           Visit Diagnoses:    ICD-10-CM ICD-9-CM   1. Psychophysiological insomnia  F51.04 307.42   2. Mild episode of recurrent major depressive disorder (HCC)  F33.0 296.31   3. LAVINIA (generalized anxiety disorder)  F41.1 300.02       TREATMENT PLAN/GOALS: Continue supportive psychotherapy efforts and medications as indicated. Treatment and medication options discussed during today's visit. Patient ackowledged and verbally consented to continue with current treatment plan and was educated on the importance of compliance with treatment and follow-up appointments.    MEDICATION ISSUES:  INSPECT reviewed as expected  Discussed medication options and treatment plan of prescribed medication as well as the risks, benefits, and side effects including potential falls, possible impaired driving and metabolic adversities among others. Patient is agreeable to call the office with any worsening of symptoms or onset of side effects. Patient is agreeable to call 911 or go to the nearest ER should he/she begin having SI/HI. No medication side effects or related complaints today.     Patient feeling more down lately,  but does not want to add another antidepressant at this time.  We did discuss adding Wellbutrin XL in the morning, oral Abilify in the evening.  Continue Pristiq 100 mg daily for depression and anxiety  Continue Trazodone 100mg tab, take one or two at bedtime for sleep  Increase Ambien to 10 mg nightly if needed.   Discussed again doing Genesight testing.     MEDS ORDERED DURING VISIT:  New Medications Ordered This Visit   Medications   • zolpidem (AMBIEN) 10 MG tablet     Sig: Take 1 tablet by mouth At Night As Needed for Sleep.     Dispense:  30 tablet     Refill:  2       Return in about 3 months (around 7/18/2022).         This document has been electronically signed by Tracy Conti PA-C  April 18, 2022 11:20 EDT    Part of this note may be an electronic transcription/translation of spoken language to printed text using the Dragon Dictation System.

## 2022-04-18 NOTE — PATIENT INSTRUCTIONS
Tresiba 15 - 17 units at bed time,   novolog 1 unit - 5 - 6  gm if carbs,   1 unit for 50 over 125 mg/dl.     Please dont do these until the A1c RESULTS.

## 2022-04-18 NOTE — PROGRESS NOTES
"Chief Complaint  Chief Complaint   Patient presents with   • Diabetes     Type 1       Subjective          History of Present Illness    Ivon Ortega 57 y.o. presents with Type 1 dm as a F/u patient.     Type 1 dm - Diagnosed for about 15 years +  Today in clinic pt reports being on Tresiba 14 - 16 units at bed time, novolog 1 unit - 6 - 8 gm if carbs, 1 unit for 50 over 140  FBG - 100 - 120  Pre meals - 100 - 140  Checks BG - 4 - 5 times x day   Insulin pump - x  Sensor - x  Dm retinopathy - x,Last eye exam - sep 2020 - due for this  Dm nephropathy - x  Dm neuropathy - yes,Dm neuropathy meds - x meds  CAD -x  CVA -x  Episodes of hypoglycemia - yes  Pt is physically active. weight has been stable.   Pt tries to follow DM diet for most part.   Prior DKA episodes - 2017          Reviewed primary care physician's/consulting physician documentation and lab results         I have reviewed the patient's allergies, medicines, past medical hx, family hx and social hx in detail.    Objective   Vital Signs:   /84   Pulse 92   Ht 165.1 cm (65\")   Wt 62.1 kg (136 lb 12.8 oz)   SpO2 98%   BMI 22.76 kg/m²   Physical Exam   General appearance - no distress  Eyes- anicteric sclera  Ear nose and throat-external ears and nose normal.    Respiratory-normal chest on inspection.  No respiratory distress noted.  Skin-no rashes.  Neuro-alert and oriented x3          Result Review :{ Labs  Result Review  Imaging  Med Tab  Media :23}   The following data was reviewed by: Shyam Zuniga MD on 04/18/2022:  Office Visit on 10/19/2021   Component Date Value Ref Range Status   • Hemoglobin A1C 01/17/2022 7.3 (A) 4.8 - 5.6 % Final    Comment:          Prediabetes: 5.7 - 6.4           Diabetes: >6.4           Glycemic control for adults with diabetes: <7.0     • Glucose 01/17/2022 88  65 - 99 mg/dL Final   • BUN 01/17/2022 12  6 - 24 mg/dL Final   • Creatinine 01/17/2022 0.79  0.57 - 1.00 mg/dL Final   • eGFR Non African Am " 01/17/2022 83  >59 mL/min/1.73 Final   • eGFR African Am 01/17/2022 95  >59 mL/min/1.73 Final    Comment: **In accordance with recommendations from the NKF-ASN Task force,**    [facility] is [facility] its eGFR calculation to the    2021 CKD-EPI creatinine equation that estimates kidney function    without a race variable.     • BUN/Creatinine Ratio 01/17/2022 15  9 - 23 Final   • Sodium 01/17/2022 139  134 - 144 mmol/L Final   • Potassium 01/17/2022 3.6  3.5 - 5.2 mmol/L Final   • Chloride 01/17/2022 100  96 - 106 mmol/L Final   • Total CO2 01/17/2022 26  20 - 29 mmol/L Final   • Calcium 01/17/2022 10.2  8.7 - 10.2 mg/dL Final   • Total Protein 01/17/2022 6.9  6.0 - 8.5 g/dL Final   • Albumin 01/17/2022 4.8  3.8 - 4.9 g/dL Final   • Globulin 01/17/2022 2.1  1.5 - 4.5 g/dL Final   • A/G Ratio 01/17/2022 2.3 (A) 1.2 - 2.2 Final   • Total Bilirubin 01/17/2022 <0.2  0.0 - 1.2 mg/dL Final   • Alkaline Phosphatase 01/17/2022 120  44 - 121 IU/L Final                  **Please note reference interval change**   • AST (SGOT) 01/17/2022 19  0 - 40 IU/L Final   • ALT (SGPT) 01/17/2022 14  0 - 32 IU/L Final   • Total Cholesterol 01/17/2022 189  100 - 199 mg/dL Final   • Triglycerides 01/17/2022 95  0 - 149 mg/dL Final   • HDL Cholesterol 01/17/2022 81  >39 mg/dL Final   • VLDL Cholesterol Anthony 01/17/2022 17  5 - 40 mg/dL Final   • LDL Chol Calc ([facility]) 01/17/2022 91  0 - 99 mg/dL Final   • Creatinine, Urine 01/17/2022 78.7  Not Estab. mg/dL Final   • Microalbumin, Urine 01/17/2022 10.9  Not Estab. ug/mL Final   • Microalbumin/Creatinine Ratio 01/17/2022 14  0 - 29 mg/g creat Final    Comment:                        Normal:                0 -  29                         Moderately increased: 30 - 300                         Severely increased:       >300     • Interpretation 01/17/2022 Note   Final    Supplemental report is available.     Data reviewed: pcp notes       Results Review:    I reviewed the patient's new  "clinical results.     Assessment and Plan    Problem List Items Addressed This Visit        Other    Type 1 diabetes mellitus (HCC) - Primary (Chronic)    Relevant Orders    Hemoglobin A1c    Hemoglobin A1c    Basic Metabolic Panel    Lipid Panel    TSH    Vitamin D 25 Hydroxy    Vitamin B12 & Folate    T4, Free      Other Visit Diagnoses     Hyperlipemia, mixed        Relevant Orders    Hemoglobin A1c    Hemoglobin A1c    Basic Metabolic Panel    Lipid Panel    TSH    Vitamin D 25 Hydroxy    Vitamin B12 & Folate    T4, Free    Long-term insulin use (HCC)        Relevant Orders    Hemoglobin A1c    Hemoglobin A1c    Basic Metabolic Panel    Lipid Panel    TSH    Vitamin D 25 Hydroxy    Vitamin B12 & Folate    T4, Free        Type 1 diabetes mellitus-uncontrolled with hyperglycemia  Patient is interested back in the insulin pump now.  Used to be on OmniPod and Dexcom at 1 time.  Wants to explore other pump options, give the options of Medtronic, she is going to look into the insurance, its benefits etc. and will alert us with her interest.    Check HbA1c today and make further adjustments to the plan.    Hyperlipidemia  Continue to monitor for now    Interpreted the blood work-up/imaging results performed by the primary care/consulting physician -    Refills sent to pharmacy    Follow Up     Patient was given instructions and counseling regarding her condition or for health maintenance advice. Please see specific information pulled into the AVS if appropriate.       Thank you for asking me to see your patient, Ivon Ortega in consultation.         Shyam Zuniga MD  04/18/22      EMR Dragon / transcription disclaimer:     \"Dictated utilizing Dragon dictation\".               "

## 2022-04-19 LAB — HBA1C MFR BLD: 7.6 % (ref 4.8–5.6)

## 2022-05-02 ENCOUNTER — TELEPHONE (OUTPATIENT)
Dept: ENDOCRINOLOGY | Age: 57
End: 2022-05-02

## 2022-05-02 RX ORDER — INSULIN ASPART 100 [IU]/ML
INJECTION, SOLUTION INTRAVENOUS; SUBCUTANEOUS
Qty: 45 ML | Refills: 3 | Status: SHIPPED | OUTPATIENT
Start: 2022-05-02

## 2022-05-02 RX ORDER — INSULIN DEGLUDEC INJECTION 100 U/ML
INJECTION, SOLUTION SUBCUTANEOUS
Qty: 30 ML | Refills: 3 | Status: SHIPPED | OUTPATIENT
Start: 2022-05-02 | End: 2022-05-03 | Stop reason: SDUPTHER

## 2022-05-02 NOTE — TELEPHONE ENCOUNTER
Pharmacy called asking to clarify the directions for Tresiba     [facility]: [phone] option 2  Reference: [id number]

## 2022-05-03 RX ORDER — INSULIN DEGLUDEC INJECTION 100 U/ML
INJECTION, SOLUTION SUBCUTANEOUS
Qty: 30 ML | Refills: 3 | Status: SHIPPED | OUTPATIENT
Start: 2022-05-03

## 2022-05-15 DIAGNOSIS — G47.00 INSOMNIA, UNSPECIFIED TYPE: ICD-10-CM

## 2022-05-15 RX ORDER — TRAZODONE HYDROCHLORIDE 100 MG/1
TABLET ORAL
Qty: 60 TABLET | Refills: 2 | Status: SHIPPED | OUTPATIENT
Start: 2022-05-15 | End: 2022-08-14

## 2022-05-17 ENCOUNTER — TELEPHONE (OUTPATIENT)
Dept: ENDOCRINOLOGY | Age: 57
End: 2022-05-17

## 2022-05-17 NOTE — TELEPHONE ENCOUNTER
Patient called to say she will be dropping off her [place] paperwork today with the fax number & would like a copy

## 2022-05-26 ENCOUNTER — DOCUMENTATION (OUTPATIENT)
Dept: ENDOCRINOLOGY | Age: 57
End: 2022-05-26

## 2022-05-26 ENCOUNTER — TELEPHONE (OUTPATIENT)
Dept: ENDOCRINOLOGY | Age: 57
End: 2022-05-26

## 2022-05-26 NOTE — PROGRESS NOTES
This patient reached out to us to help with her blood pressure management, and she reported that you recommended her to reach out to us.  I am not sure  in terms of what exactly is her concern.  Please help me with this. Thanks

## 2022-05-26 NOTE — TELEPHONE ENCOUNTER
Would recommend the patient to go to the ER for the significantly elevated blood pressure.  And we can do the endocrine work-up that might be contributing for the elevated blood pressure once the blood pressure is stabilized.

## 2022-05-26 NOTE — PROGRESS NOTES
Hi Dr. Zuniga,    I have no idea what she is talking about.  I would have her call me for that kind of complaint.  Maybe she was confused?  I went back in the notes and I cannot see how she came to that conclusion.  We will see if we can get a hold of her.      Les

## 2022-05-26 NOTE — TELEPHONE ENCOUNTER
PATIENT STATED THAT HE B/P IS RUNNING HIGH   AS WAS TOLD TO TALK TO YOU BY DR LEE IN REGARDS TO PATIENT HIGH B/P

## 2022-06-06 ENCOUNTER — TELEPHONE (OUTPATIENT)
Dept: INTERNAL MEDICINE | Facility: CLINIC | Age: 57
End: 2022-06-06

## 2022-06-06 NOTE — TELEPHONE ENCOUNTER
Would you mind touching base with this patient to see if her blood pressures ever got under control?  She had called endocrine mentioning her blood pressures were high but did not call this office from what I can tell.

## 2022-06-08 NOTE — TELEPHONE ENCOUNTER
Advised patient after speaking with Dr Soto he wanted to work her in for B/P evaluation and possible medication change.  Patient scheduled for 06/14/2022 with Dr Soto.

## 2022-06-08 NOTE — TELEPHONE ENCOUNTER
I contacted the patient and she states it is still running high.  She is going to contact the office with her most recent readings.  I informed her once she gets me her readings I would let you know and we can go from there.

## 2022-06-08 NOTE — TELEPHONE ENCOUNTER
If they are still running high, just get her a follow-up.  It is hard adjusting BP meds over telephone calls and coryt

## 2022-07-05 ENCOUNTER — OFFICE VISIT (OUTPATIENT)
Dept: INTERNAL MEDICINE | Facility: CLINIC | Age: 57
End: 2022-07-05

## 2022-07-05 VITALS
DIASTOLIC BLOOD PRESSURE: 92 MMHG | TEMPERATURE: 97.3 F | WEIGHT: 134 LBS | BODY MASS INDEX: 22.3 KG/M2 | SYSTOLIC BLOOD PRESSURE: 140 MMHG | HEART RATE: 78 BPM | OXYGEN SATURATION: 98 %

## 2022-07-05 DIAGNOSIS — I10 ESSENTIAL HYPERTENSION: Primary | ICD-10-CM

## 2022-07-05 PROCEDURE — 99214 OFFICE O/P EST MOD 30 MIN: CPT | Performed by: FAMILY MEDICINE

## 2022-07-05 RX ORDER — AMLODIPINE BESYLATE 5 MG/1
5 TABLET ORAL DAILY
Qty: 30 TABLET | Refills: 3 | Status: SHIPPED | OUTPATIENT
Start: 2022-07-05 | End: 2022-11-10 | Stop reason: SDUPTHER

## 2022-07-05 NOTE — PROGRESS NOTES
"Chief Complaint  Hypertension    Subjective        Ivon Ortega presents to [facility]  History of Present Illness    Coming in due to elevated blood pressure.  HJer cuff is about 2 years ago.  BPs are all in the morning and ranges about 130s-150s/80s-100s.  She takes the BP prior to taking her BP medications.  She says her job is stressful at [employer].   Denies headaches, shortness of breath, lower extremity edema.      Objective   Vital Signs:  /92 (BP Location: Left arm, Patient Position: Sitting, Cuff Size: Adult)   Pulse 78   Temp 97.3 °F (36.3 °C) (Tympanic)   Wt 60.8 kg (134 lb)   SpO2 98%   BMI 22.30 kg/m²   Estimated body mass index is 22.3 kg/m² as calculated from the following:    Height as of 4/18/22: 165.1 cm (65\").    Weight as of this encounter: 60.8 kg (134 lb).    BMI is within normal parameters. No other follow-up for BMI required.      Physical Exam  Vitals and nursing note reviewed.   Constitutional:       General: She is not in acute distress.     Appearance: Normal appearance.   Cardiovascular:      Rate and Rhythm: Normal rate and regular rhythm.      Heart sounds: Normal heart sounds. No murmur heard.  Pulmonary:      Effort: Pulmonary effort is normal.      Breath sounds: Normal breath sounds.   Neurological:      Mental Status: She is alert.        Result Review :  The following data was reviewed by: Les Soto MD on 07/05/2022:  Common labs    Common Labsle 11/18/21 1/17/22 1/17/22 1/17/22 1/17/22 4/18/22     1430 1430 1430 1430    Glucose   88      BUN   12      Creatinine   0.79      eGFR Non  Am   83      eGFR African Am   95      Sodium   139      Potassium 3.7  3.6      Chloride   100      Calcium   10.2      Total Protein   6.9      Albumin   4.8      Total Bilirubin   <0.2      Alkaline Phosphatase   120      AST (SGOT)   19      ALT (SGPT)   14      Total Cholesterol    189     Triglycerides    95     HDL Cholesterol    81   "   LDL Cholesterol     91     Hemoglobin A1C  7.3 (A)    7.6 (A)   Microalbumin, Urine     10.9    (A) Abnormal value       Comments are available for some flowsheets but are not being displayed.                     Assessment and Plan   Diagnoses and all orders for this visit:    1. Essential hypertension (Primary)  -     amLODIPine (NORVASC) 5 MG tablet; Take 1 tablet by mouth Daily.  Dispense: 30 tablet; Refill: 3        Blood pressure is uncontrolled today.  We discussed the pros and cons of adding another medication to her blood pressure regimen.  I would recommend continuing the losartan-HCTZ.  At this time we will add amlodipine 5 mg as well and have her check back in about 2 months for follow-up.  I did also discuss stress management as well as monitoring salts in her food.           Follow Up   Return in about 8 weeks (around 8/30/2022) for Next scheduled follow up- HTN.  Patient was given instructions and counseling regarding her condition or for health maintenance advice. Please see specific information pulled into the AVS if appropriate.

## 2022-07-25 ENCOUNTER — TELEMEDICINE (OUTPATIENT)
Dept: PSYCHIATRY | Facility: CLINIC | Age: 57
End: 2022-07-25

## 2022-07-25 DIAGNOSIS — F41.9 ANXIETY: ICD-10-CM

## 2022-07-25 DIAGNOSIS — F33.0 MILD EPISODE OF RECURRENT MAJOR DEPRESSIVE DISORDER: Chronic | ICD-10-CM

## 2022-07-25 DIAGNOSIS — F51.04 PSYCHOPHYSIOLOGICAL INSOMNIA: Chronic | ICD-10-CM

## 2022-07-25 DIAGNOSIS — F41.1 GAD (GENERALIZED ANXIETY DISORDER): Primary | Chronic | ICD-10-CM

## 2022-07-25 PROCEDURE — 99214 OFFICE O/P EST MOD 30 MIN: CPT | Performed by: PHYSICIAN ASSISTANT

## 2022-07-25 RX ORDER — DESVENLAFAXINE 100 MG/1
100 TABLET, EXTENDED RELEASE ORAL DAILY
Qty: 90 TABLET | Refills: 3 | Status: SHIPPED | OUTPATIENT
Start: 2022-07-25 | End: 2022-11-10 | Stop reason: SDUPTHER

## 2022-07-25 NOTE — PROGRESS NOTES
Subjective   Ivon Ortega is a 57 y.o. female who presents today for follow up via telehealth    This provider is located at [facility], [address] [place] [address], [facility], [facility] using a secure Eponymt Video Visit through [employer]. Patient is being seen remotely via telehealth at their home address in [place], and stated they are in a secure environment for this session. The patient's condition being diagnosed/treated is appropriate for telemedicine. The provider identified herself as well as her credentials.   The patient, and/or patients guardian, consent to be seen remotely, and when consent is given they understand that the consent allows for patient identifiable information to be sent to a third party as needed.   They may refuse to be seen remotely at any time. The electronic data is encrypted and password protected, and the patient and/or guardian has been advised of the potential risks to privacy not withstanding such measures.   PT Identifiers used: Name and .    You have chosen to receive care through a telehealth visit.  Do you consent to use a video/audio connection for your medical care today? Yes      Chief Complaint:  Anxiety/Depression/Insomnia    History of Present Illness:   Dr Les Soto at [facility] referred here  She is on desvenlaxafine 100 mg daily for a while per Dr. Soto   [place] and going to [facility] on  for a week to visit friends  Doing well, working part-time has made a big difference  He referred her for sleep study () because not sleeping, has mild sleep apnea, insurance won't cover the apnea mouth appliance  CBT recommended  Sleeping better with 150 mg of Trazodone and 10 mg of Ambien  Having some issues with her Diabetes  Just got a raise, and went part-time, 4 am to 9 am, gets up at 2:15 am for work, ([employer] for [employer]), goes to bed about 7pm  Depression 4/10  Feels lonely, has two sons and one grandson, wishes she could see  them more.  Anxiety 3/10  Denies SI/HI  Dr Guerrero prescribed Belsomra first but was too expensive and didn't  the Rx     PMH: Diabetes, HTN, HLD  Adopted, biological father committed suicide when she was very young.      The following portions of the patient's history were reviewed and updated as appropriate: allergies, current medications, past family history, past medical history, past social history, past surgical history and problem list.    PAST PSYCHIATRIC HISTORY  Axis I  Affective/Bipoloar Disorder, Anxiety/Panic Disorder  Axis II  None    PAST OUTPATIENT TREATMENT  Diagnosis treated:  Affective Disorder, Anxiety/Panic Disorder  Treatment Type:  Medication Management  Prior Psychiatric Medications:  Duloxetine  Ambien CR  Halcion  Paxil  Pristiq  Trazodone  Ambien  Belsomra never tried b/c too expensive  Support Groups:  None  Sequelae Of Mental Disorder:  emotional distress      Interval History  No Change    Side Effects  None    Past Psychiatric Hx was reviewed and compared to 22 visit and appropriate updates were made.    Past Medical History:  Past Medical History:   Diagnosis Date   • Arthralgia of hip 2/10/2016   • Arthritis    • Depression    • Diabetes mellitus (HCC)    • Diabetic ketoacidosis without coma associated with type 1 diabetes mellitus (HCC) 2017   • Esophageal candidiasis (HCC)    • Hyperlipidemia    • Hypertension    • Insomnia    • Pregnancy        • Thyroid disease        Social History:  Social History     Socioeconomic History   • Marital status:    Tobacco Use   • Smoking status: Current Every Day Smoker     Packs/day: 0.25     Years: 5.00     Pack years: 1.25     Types: Cigarettes     Last attempt to quit: 1990     Years since quittin.5   • Smokeless tobacco: Never Used   Vaping Use   • Vaping Use: Never used   Substance and Sexual Activity   • Alcohol use: Yes   • Drug use: No   • Sexual activity: Defer       Family History:  Family  History   Adopted: Yes   Problem Relation Age of Onset   • Suicide Attempts Father        Past Surgical History:  Past Surgical History:   Procedure Laterality Date   • CLAVICLE SURGERY  11/2021   • TUBAL ABDOMINAL LIGATION         Problem List:  Patient Active Problem List   Diagnosis   • LAVINIA (generalized anxiety disorder)   • Mixed hyperlipidemia   • Essential hypertension   • Insomnia   • Type 1 diabetes mellitus (HCC)   • Fatty infiltration of liver   • Insulin pump status   • Health care maintenance   • Cervicalgia   • Degenerative disc disease, cervical   • Osteoporosis, postmenopausal   • Chest pain   • Hypokalemia   • Long-term current use of benzodiazepine   • Obstructive sleep apnea   • Depression       Allergy:   Allergies   Allergen Reactions   • Ampicillin Diarrhea        Discontinued Medications:  Medications Discontinued During This Encounter   Medication Reason   • desvenlafaxine (PRISTIQ) 100 MG 24 hr tablet Reorder       Current Medications:   Current Outpatient Medications   Medication Sig Dispense Refill   • desvenlafaxine (PRISTIQ) 100 MG 24 hr tablet Take 1 tablet by mouth Daily. 90 tablet 3   • amLODIPine (NORVASC) 5 MG tablet Take 1 tablet by mouth Daily. 30 tablet 3   • B-D UF III MINI PEN NEEDLES 31G X 5 MM misc USE TO INJECT INSULIN 4 TIMES DAILY 400 each 3   • Blood Pressure Monitoring (5 SERIES BP MONITOR) device      • Glucagon (Gvoke HypoPen 2-Pack) 1 MG/0.2ML solution auto-injector Inject 1 mg under the skin into the appropriate area as directed As Needed (hypoglycemia). 0.4 mL 1   • glucose blood test strip USE TO TEST BLOOD SUGAR 6 TIMES DAILY  ONE TOUCH ULTRA TEST STRIPS 600 each 3   • insulin aspart (NovoLOG FlexPen) 100 UNIT/ML solution pen-injector sc pen INJECT 1 UNIT FOR 7 GRAMS FOR BREAKFAST, 1 UNIT FOR 6 GRAMS FOR LUNCH, 1 UNIT FOR 8 GRAMS FOR DINNER. MAXIMUM DAILY DOSE 50 UNITS 45 mL 3   • insulin degludec (Tresiba FlexTouch) 100 UNIT/ML solution pen-injector injection  Inject 15 to 17 units under the skin in the morning e10.8 30 mL 3   • losartan-hydrochlorothiazide (HYZAAR) 100-25 MG per tablet TAKE ONE TABLET BY MOUTH DAILY 90 tablet 3   • MILK THISTLE PO Take  by mouth Daily.     • Multiple Vitamins-Minerals (Multivitamin Adult) chewable tablet Chew.     • ONETOUCH VERIO test strip USE TO TEST BLOOD SUGAR 6 TIMES DAILY 600 each 3   • traZODone (DESYREL) 100 MG tablet TAKE ONE TO TWO TABLETS BY MOUTH EVERY NIGHT AT BEDTIME FOR SLEEP 60 tablet 2   • zolpidem (AMBIEN) 10 MG tablet Take 1 tablet by mouth At Night As Needed for Sleep. 30 tablet 2     No current facility-administered medications for this visit.         Psychological ROS: positive for - anxiety, depression and sleep disturbances  negative for - behavioral disorder, concentration difficulties, decreased libido, disorientation, hallucinations, hostility, irritability, memory difficulties, mood swings, obsessive thoughts, physical abuse or sexual abuse      Physical Exam:   There were no vitals taken for this visit.    Mental Status Exam:   Hygiene:   good  Cooperation:  Cooperative  Eye Contact:  Good  Psychomotor Behavior:  Appropriate  Affect:  Appropriate  Mood: Normal   Hopelessness: Denies  Speech:  Normal  Thought Process:  Goal directed  Thought Content:  Normal  Suicidal:  None  Homicidal:  None  Hallucinations:  None  Delusion:  None  Memory:  Intact  Orientation:  Person, Place, Time and Situation  Reliability:  good  Insight:  Good  Judgement:  Good  Impulse Control:  Good  Physical/Medical Issues:  Yes Diabetes, HTN     Mental Status Exam was reviewed and compared to 4/18/22 visit and appropriate updates were made.    PHQ-9 Depression Screening  Little interest or pleasure in doing things? (P) 1   Feeling down, depressed, or hopeless? (P) 1   Trouble falling or staying asleep, or sleeping too much? (P) 2   Feeling tired or having little energy? (P) 2   Poor appetite or overeating? (P) 1   Feeling bad about  yourself - or that you are a failure or have let yourself or your family down? (P) 0   Trouble concentrating on things, such as reading the newspaper or watching television? (P) 1   Moving or speaking so slowly that other people could have noticed? Or the opposite - being so fidgety or restless that you have been moving around a lot more than usual? (P) 1   Thoughts that you would be better off dead, or of hurting yourself in some way? (P) 0   PHQ-9 Total Score (P) 9   If you checked off any problems, how difficult have these problems made it for you to do your work, take care of things at home, or get along with other people? (P) Somewhat difficult           Current every day smoker less than 3 minutes spent counseling Has reduced Tobbacos use    I advised Ivon of the risks of tobacco use.     Lab Results:   No visits with results within 3 Month(s) from this visit.   Latest known visit with results is:   Office Visit on 04/18/2022   Component Date Value Ref Range Status   • Hemoglobin A1C 04/18/2022 7.6 (A) 4.8 - 5.6 % Final    Comment:          Prediabetes: 5.7 - 6.4           Diabetes: >6.4           Glycemic control for adults with diabetes: <7.0         Assessment & Plan   Problems Addressed this Visit        Mental Health    LAVINIA (generalized anxiety disorder) - Primary (Chronic)    Relevant Medications    desvenlafaxine (PRISTIQ) 100 MG 24 hr tablet    Depression (Chronic)    Relevant Medications    desvenlafaxine (PRISTIQ) 100 MG 24 hr tablet       Sleep    Insomnia (Chronic)      Other Visit Diagnoses     Anxiety        Relevant Medications    desvenlafaxine (PRISTIQ) 100 MG 24 hr tablet      Diagnoses       Codes Comments    LAVINIA (generalized anxiety disorder)    -  Primary ICD-10-CM: F41.1  ICD-9-CM: 300.02     Psychophysiological insomnia     ICD-10-CM: F51.04  ICD-9-CM: 307.42     Mild episode of recurrent major depressive disorder (HCC)     ICD-10-CM: F33.0  ICD-9-CM: 296.31     Anxiety      ICD-10-CM: F41.9  ICD-9-CM: 300.00           Visit Diagnoses:    ICD-10-CM ICD-9-CM   1. LAVINIA (generalized anxiety disorder)  F41.1 300.02   2. Psychophysiological insomnia  F51.04 307.42   3. Mild episode of recurrent major depressive disorder (HCC)  F33.0 296.31   4. Anxiety  F41.9 300.00       TREATMENT PLAN/GOALS: Continue supportive psychotherapy efforts and medications as indicated. Treatment and medication options discussed during today's visit. Patient ackowledged and verbally consented to continue with current treatment plan and was educated on the importance of compliance with treatment and follow-up appointments.    MEDICATION ISSUES:  INSPECT reviewed as expected  Discussed medication options and treatment plan of prescribed medication as well as the risks, benefits, and side effects including potential falls, possible impaired driving and metabolic adversities among others. Patient is agreeable to call the office with any worsening of symptoms or onset of side effects. Patient is agreeable to call 911 or go to the nearest ER should he/she begin having SI/HI. No medication side effects or related complaints today.     Patient feeling a lot better lately, [place] doing well on current meds.   Continue Pristiq 100 mg daily for depression and anxiety  Continue Trazodone 100mg tab, take one or two at bedtime for sleep  Continue Ambien 10 mg nightly PRN sleep.      MEDS ORDERED DURING VISIT:  New Medications Ordered This Visit   Medications   • desvenlafaxine (PRISTIQ) 100 MG 24 hr tablet     Sig: Take 1 tablet by mouth Daily.     Dispense:  90 tablet     Refill:  3       Return in about 4 months (around 11/25/2022) for video visit.         This document has been electronically signed by Tracy Conti PA-C  July 26, 2022 07:56 EDT    Part of this note may be an electronic transcription/translation of spoken language to printed text using the Dragon Dictation System.

## 2022-08-12 ENCOUNTER — DOCUMENTATION (OUTPATIENT)
Dept: ENDOCRINOLOGY | Age: 57
End: 2022-08-12

## 2022-08-14 DIAGNOSIS — G47.00 INSOMNIA, UNSPECIFIED TYPE: ICD-10-CM

## 2022-08-14 RX ORDER — TRAZODONE HYDROCHLORIDE 100 MG/1
TABLET ORAL
Qty: 60 TABLET | Refills: 2 | Status: SHIPPED | OUTPATIENT
Start: 2022-08-14 | End: 2022-11-16

## 2022-08-18 ENCOUNTER — OFFICE VISIT (OUTPATIENT)
Dept: ENDOCRINOLOGY | Age: 57
End: 2022-08-18

## 2022-08-18 ENCOUNTER — TELEPHONE (OUTPATIENT)
Dept: ENDOCRINOLOGY | Age: 57
End: 2022-08-18

## 2022-08-18 VITALS
DIASTOLIC BLOOD PRESSURE: 70 MMHG | SYSTOLIC BLOOD PRESSURE: 118 MMHG | OXYGEN SATURATION: 97 % | HEART RATE: 75 BPM | BODY MASS INDEX: 22.57 KG/M2 | TEMPERATURE: 98.4 F | WEIGHT: 135.6 LBS

## 2022-08-18 DIAGNOSIS — E10.43 TYPE 1 DIABETES MELLITUS WITH DIABETIC AUTONOMIC NEUROPATHY: Primary | ICD-10-CM

## 2022-08-18 DIAGNOSIS — E78.2 HYPERLIPEMIA, MIXED: ICD-10-CM

## 2022-08-18 PROCEDURE — 99214 OFFICE O/P EST MOD 30 MIN: CPT | Performed by: NURSE PRACTITIONER

## 2022-08-18 RX ORDER — PROCHLORPERAZINE 25 MG/1
SUPPOSITORY RECTAL
Qty: 9 EACH | Refills: 1 | Status: SHIPPED | OUTPATIENT
Start: 2022-08-18

## 2022-08-18 RX ORDER — INSULIN PMP CART,AUT,G6/7,CNTR
1 EACH SUBCUTANEOUS
Qty: 1 KIT | Refills: 0 | Status: SHIPPED | OUTPATIENT
Start: 2022-08-18 | End: 2022-11-17

## 2022-08-18 RX ORDER — PROCHLORPERAZINE 25 MG/1
1 SUPPOSITORY RECTAL
Qty: 1 EACH | Refills: 1 | Status: SHIPPED | OUTPATIENT
Start: 2022-08-18 | End: 2022-11-17

## 2022-08-18 RX ORDER — INSULIN PMP CART,AUT,G6/7,CNTR
1 EACH SUBCUTANEOUS
Qty: 10 EACH | Refills: 5 | Status: SHIPPED | OUTPATIENT
Start: 2022-08-18 | End: 2022-11-17

## 2022-08-18 NOTE — TELEPHONE ENCOUNTER
Dexcom Sensor 9 each for 90 days $494.93  Dexcom Transmitter 1 each for 90 days $113.56  Omnipod 5 Intro Kit $259.16 1 kit for 33 days  Omnipod 5 Pods $259.16 10 pods for 30 days

## 2022-08-18 NOTE — PROGRESS NOTES
"Chief Complaint  Diabetes (Type 1: Patient has meter with her today no hx of retinopathy, mild case of neuropathy )    Subjective        Ivon Ortega presents to [facility]  History of Present Illness     Lab Results   Component Value Date    HGBA1C 7.4 (H) 08/11/2022         Type 1 dm    Diagnosed for about 15 years +  Today in clinic pt reports being on Tresiba 13-14 units at bed time, novolog 5-7u TIDaC, 1 unit for 50 over 140  FBG - <150s  Pre meals - <180  Checks BG - 4 - 5 times x day   Dm retinopathy - x  Dm nephropathy - x  Dm neuropathy - yes  CAD -x  CVA -x  Episodes of hypoglycemia - occasionally   Prior DKA episodes - 2017  On ARB     Objective   Vital Signs:  /70   Pulse 75   Temp 98.4 °F (36.9 °C) (Temporal)   Wt 61.5 kg (135 lb 9.6 oz)   SpO2 97%   BMI 22.57 kg/m²   Estimated body mass index is 22.57 kg/m² as calculated from the following:    Height as of 4/18/22: 165.1 cm (65\").    Weight as of this encounter: 61.5 kg (135 lb 9.6 oz).    BMI is within normal parameters. No other follow-up for BMI required.      Physical Exam  Vitals reviewed.   Constitutional:       General: She is not in acute distress.  HENT:      Head: Normocephalic and atraumatic.   Cardiovascular:      Rate and Rhythm: Normal rate and regular rhythm.   Pulmonary:      Effort: Pulmonary effort is normal. No respiratory distress.   Musculoskeletal:         General: No signs of injury. Normal range of motion.      Cervical back: Normal range of motion and neck supple.   Skin:     General: Skin is warm and dry.   Neurological:      Mental Status: She is alert and oriented to person, place, and time. Mental status is at baseline.   Psychiatric:         Mood and Affect: Mood normal.         Behavior: Behavior normal.         Thought Content: Thought content normal.         Judgment: Judgment normal.        Result Review :  The following data was reviewed by: FARIHA Casey on " 08/18/2022:  Common labs    Common Labsle 1/17/22 1/17/22 1/17/22 1/17/22 4/18/22 8/11/22 8/11/22 8/11/22    1430 1430 1430 1430  1004 1004 1004   Glucose  88     187 (A)    BUN  12     11    Creatinine  0.79     0.76    eGFR Non  Am  83         eGFR African Am  95         Sodium  139     135    Potassium  3.6     3.8    Chloride  100     97    Calcium  10.2     9.6    Total Protein  6.9         Albumin  4.8         Total Bilirubin  <0.2         Alkaline Phosphatase  120         AST (SGOT)  19         ALT (SGPT)  14         Total Cholesterol   189     221 (A)   Triglycerides   95     99   HDL Cholesterol   81     77   LDL Cholesterol    91     127 (A)   Hemoglobin A1C 7.3 (A)    7.6 (A) 7.4 (A)     Microalbumin, Urine    10.9       (A) Abnormal value       Comments are available for some flowsheets but are not being displayed.                     Assessment and Plan   Diagnoses and all orders for this visit:    1. Type 1 diabetes mellitus with diabetic autonomic neuropathy (HCC) (Primary)  -     TSH; Future  -     T4, Free; Future  -     Hemoglobin A1c; Future  -     Comprehensive Metabolic Panel; Future  -     Lipid Panel; Future  -     Microalbumin / Creatinine Urine Ratio - Urine, Clean Catch; Future    2. Hyperlipemia, mixed  -     TSH; Future  -     T4, Free; Future  -     Hemoglobin A1c; Future  -     Comprehensive Metabolic Panel; Future  -     Lipid Panel; Future  -     Microalbumin / Creatinine Urine Ratio - Urine, Clean Catch; Future    Other orders  -     Continuous Blood Gluc Sensor (Dexcom G6 Sensor); Replace sensor every 10 days.  Dx: E 10.9  Dispense: 9 each; Refill: 1  -     Continuous Blood Gluc Transmit (Dexcom G6 Transmitter) misc; Use 1 Every 3 (Three) Months. Dx: E 10.9  Dispense: 1 each; Refill: 1  -     Insulin Disposable Pump (Omnipod 5 G6 Intro, Gen 5,) kit; Use 1 each Every 72 (Seventy-Two) Hours.  Dispense: 1 kit; Refill: 0  -     Insulin Disposable Pump (Omnipod 5 G6 Pod, Gen 5,)  misc; 1 each Every 72 (Seventy-Two) Hours.  Dispense: 10 each; Refill: 5             Follow Up   No follow-ups on file.     dexcom sample given  Look into omnipod 5- cost?  a1c favorable    She is going to resume her statin, LDL worse     Patient was given instructions and counseling regarding her condition or for health maintenance advice. Please see specific information pulled into the AVS if appropriate.     Estee Mckinney APRN

## 2022-08-19 ENCOUNTER — TELEPHONE (OUTPATIENT)
Dept: ENDOCRINOLOGY | Age: 57
End: 2022-08-19

## 2022-08-21 DIAGNOSIS — F51.04 PSYCHOPHYSIOLOGICAL INSOMNIA: Chronic | ICD-10-CM

## 2022-08-21 RX ORDER — ZOLPIDEM TARTRATE 10 MG/1
TABLET ORAL
Qty: 30 TABLET | Refills: 2 | Status: SHIPPED | OUTPATIENT
Start: 2022-08-21 | End: 2022-11-17 | Stop reason: SDUPTHER

## 2022-08-24 DIAGNOSIS — I10 ESSENTIAL HYPERTENSION: ICD-10-CM

## 2022-08-25 RX ORDER — LOSARTAN POTASSIUM AND HYDROCHLOROTHIAZIDE 25; 100 MG/1; MG/1
TABLET ORAL
Qty: 90 TABLET | Refills: 3 | Status: SHIPPED | OUTPATIENT
Start: 2022-08-25

## 2022-08-26 ENCOUNTER — TELEPHONE (OUTPATIENT)
Dept: ENDOCRINOLOGY | Age: 57
End: 2022-08-26

## 2022-08-26 NOTE — TELEPHONE ENCOUNTER
LVM for patient re: pricing for Dexcom and Omnipod 5  · Dexcom Sensor 9 each for 90 days $494.93   · Dexcom Transmitter 1 each for 90 days $113.56   · Omnipod 5 Intro Kit $259.16 1 kit for 33 days   · Omnipod 5 Pods $259.16 10 pods for 30 days       Ronda Shepherd PharmD  8/26/2022  13:35 EDT

## 2022-08-31 NOTE — TELEPHONE ENCOUNTER
Patient returned my call and I provided her with the prices for the Dexcom and Omnipod 5.  Patient stated she would also give [facility] / [facility] a call to compare prices.  I advised patient to keep us updated where she would like the orders sent if she would like to move forward.    Ronda Shepherd, PharmD  8/31/2022  14:24 EDT

## 2022-10-28 ENCOUNTER — TELEPHONE (OUTPATIENT)
Dept: INTERNAL MEDICINE | Facility: CLINIC | Age: 57
End: 2022-10-28

## 2022-10-28 NOTE — TELEPHONE ENCOUNTER
I am not aware of Pristiq causing that kind of issue.  Certainly chronic stress from a heavy workload could contribute.  I would have her get some rest when she can, drink plenty of fluids and see how it goes over the weekend.  I would not have her change her insulin regimen yet.

## 2022-11-10 DIAGNOSIS — I10 ESSENTIAL HYPERTENSION: ICD-10-CM

## 2022-11-10 DIAGNOSIS — F41.9 ANXIETY: ICD-10-CM

## 2022-11-10 RX ORDER — AMLODIPINE BESYLATE 5 MG/1
5 TABLET ORAL DAILY
Qty: 30 TABLET | Refills: 3 | Status: SHIPPED | OUTPATIENT
Start: 2022-11-10 | End: 2023-03-27

## 2022-11-10 RX ORDER — DESVENLAFAXINE 100 MG/1
100 TABLET, EXTENDED RELEASE ORAL DAILY
Qty: 90 TABLET | Refills: 1 | Status: SHIPPED | OUTPATIENT
Start: 2022-11-10 | End: 2022-11-17 | Stop reason: SDUPTHER

## 2022-11-10 NOTE — TELEPHONE ENCOUNTER
Caller: Ivon Ortega    Relationship: Self    Best call back number: [phone]    Requested Prescriptions:   Requested Prescriptions     Pending Prescriptions Disp Refills   • amLODIPine (NORVASC) 5 MG tablet 30 tablet 3     Sig: Take 1 tablet by mouth Daily.   • desvenlafaxine (PRISTIQ) 100 MG 24 hr tablet 90 tablet 3     Sig: Take 1 tablet by mouth Daily.        Pharmacy where request should be sent: [facility] [id number] - [address] AT [facility] [phone]  - [phone] FX     Additional details provided by patient: PATIENT HAS BEEN OUT FOR 4 DAYS. STATES SHE LOST BOTH OF THESE AFTER PICKING THEM UP. WOULD LIKE TO KNOW IF CLINICAL COULD OKAY HER TO  EARLIER THAN SHE WOULD BE DUE     Does the patient have less than a 3 day supply:  [x] Yes  [] No    Britni Piña Rep   11/10/22 08:30 EST

## 2022-11-16 DIAGNOSIS — G47.00 INSOMNIA, UNSPECIFIED TYPE: ICD-10-CM

## 2022-11-16 RX ORDER — TRAZODONE HYDROCHLORIDE 100 MG/1
TABLET ORAL
Qty: 60 TABLET | Refills: 2 | Status: SHIPPED | OUTPATIENT
Start: 2022-11-16 | End: 2023-02-23

## 2022-11-17 ENCOUNTER — TELEMEDICINE (OUTPATIENT)
Dept: PSYCHIATRY | Facility: CLINIC | Age: 57
End: 2022-11-17

## 2022-11-17 DIAGNOSIS — F51.04 PSYCHOPHYSIOLOGICAL INSOMNIA: Chronic | ICD-10-CM

## 2022-11-17 DIAGNOSIS — F41.9 ANXIETY: ICD-10-CM

## 2022-11-17 DIAGNOSIS — F33.0 MILD EPISODE OF RECURRENT MAJOR DEPRESSIVE DISORDER: Chronic | ICD-10-CM

## 2022-11-17 DIAGNOSIS — F41.1 GAD (GENERALIZED ANXIETY DISORDER): Primary | Chronic | ICD-10-CM

## 2022-11-17 PROCEDURE — 99214 OFFICE O/P EST MOD 30 MIN: CPT | Performed by: PHYSICIAN ASSISTANT

## 2022-11-17 RX ORDER — DESVENLAFAXINE SUCCINATE 50 MG/1
50 TABLET, EXTENDED RELEASE ORAL DAILY
Qty: 90 TABLET | Refills: 1 | Status: SHIPPED | OUTPATIENT
Start: 2022-11-17

## 2022-11-17 RX ORDER — ZOLPIDEM TARTRATE 10 MG/1
10 TABLET ORAL NIGHTLY PRN
Qty: 30 TABLET | Refills: 2 | Status: SHIPPED | OUTPATIENT
Start: 2022-11-17 | End: 2023-02-28

## 2022-11-17 NOTE — PROGRESS NOTES
Subjective   Ivon Ortega is a 57 y.o. female who presents today for follow up via telehealth    This provider is located in [place], [place] using a secure [employer] Video Visit through [employer]. Patient is being seen remotely via telehealth at their home address in [place], and stated they are in a secure environment for this session. The patient's condition being diagnosed/treated is appropriate for telemedicine. The provider identified herself as well as her credentials.   The patient, and/or patients guardian, consent to be seen remotely, and when consent is given they understand that the consent allows for patient identifiable information to be sent to a third party as needed.   They may refuse to be seen remotely at any time. The electronic data is encrypted and password protected, and the patient and/or guardian has been advised of the potential risks to privacy not withstanding such measures.   PT Identifiers used: Name and .    You have chosen to receive care through a telehealth visit.  Do you consent to use a video/audio connection for your medical care today? Yes    Chief Complaint:  Anxiety/Depression/Insomnia    History of Present Illness:   Dr Les Soto at [facility] referred here  She is on desvenlaxafine 100 mg daily for a while per Dr. Soto   [place] and going to [facility] on  for a week to visit friends  Her grandson is 18 months old, got to see him yesterday,   Doing well, working part-time has made a big difference  He referred her for sleep study () because not sleeping, has mild sleep apnea, insurance won't cover the apnea mouth appliance  CBT recommended  Sleeping better with 150 mg of Trazodone and 10 mg of Ambien  Having some issues with her Diabetes  Just got a raise, and went part-time, 4 am to 9 am, gets up at 2:15 am for work, ([employer] for [employer]), goes to bed about 7pm  Depression 4/10  Feels lonely, has two sons and one  grandson, wishes she could see them more.  Anxiety 3/10  Denies SI/HI  Dr Guerrero prescribed Belsomra first but was too expensive and didn't  the Rx     PMH: Diabetes, HTN, HLD  Adopted, biological father committed suicide when she was very young.      The following portions of the patient's history were reviewed and updated as appropriate: allergies, current medications, past family history, past medical history, past social history, past surgical history and problem list.    PAST PSYCHIATRIC HISTORY  Axis I  Affective/Bipoloar Disorder, Anxiety/Panic Disorder  Axis II  None    PAST OUTPATIENT TREATMENT  Diagnosis treated:  Affective Disorder, Anxiety/Panic Disorder  Treatment Type:  Medication Management  Prior Psychiatric Medications:  Duloxetine  Ambien CR  Halcion  Paxil  Pristiq  Trazodone  Ambien  Belsomra never tried b/c too expensive  Support Groups:  None  Sequelae Of Mental Disorder:  emotional distress      Interval History  No Change    Side Effects  None    Past Psychiatric Hx was reviewed and compared to 22 visit and appropriate updates were made.    Past Medical History:  Past Medical History:   Diagnosis Date   • Arthralgia of hip 2/10/2016   • Arthritis    • Depression    • Diabetes mellitus (HCC)    • Diabetic ketoacidosis without coma associated with type 1 diabetes mellitus (HCC) 2017   • Esophageal candidiasis (HCC)    • Hyperlipidemia    • Hypertension    • Insomnia    • Pregnancy        • Thyroid disease        Social History:  Social History     Socioeconomic History   • Marital status:    Tobacco Use   • Smoking status: Some Days     Packs/day: 0.25     Years: 5.00     Pack years: 1.25     Types: Cigarettes     Last attempt to quit: 1990     Years since quittin.9   • Smokeless tobacco: Never   Vaping Use   • Vaping Use: Never used   Substance and Sexual Activity   • Alcohol use: Yes     Comment: socially   • Drug use: No   • Sexual activity: Defer        Family History:  Family History   Adopted: Yes   Problem Relation Age of Onset   • Suicide Attempts Father        Past Surgical History:  Past Surgical History:   Procedure Laterality Date   • CLAVICLE SURGERY  11/2021   • TUBAL ABDOMINAL LIGATION         Problem List:  Patient Active Problem List   Diagnosis   • LAVINIA (generalized anxiety disorder)   • Mixed hyperlipidemia   • Essential hypertension   • Insomnia   • Type 1 diabetes mellitus (HCC)   • Fatty infiltration of liver   • Insulin pump status   • Health care maintenance   • Cervicalgia   • Degenerative disc disease, cervical   • Osteoporosis, postmenopausal   • Chest pain   • Hypokalemia   • Long-term current use of benzodiazepine   • Obstructive sleep apnea   • Depression       Allergy:   Allergies   Allergen Reactions   • Ampicillin Diarrhea        Discontinued Medications:  Medications Discontinued During This Encounter   Medication Reason   • Insulin Disposable Pump (Omnipod 5 G6 Pod, Gen 5,) misc *Therapy completed   • Insulin Disposable Pump (Omnipod 5 G6 Intro, Gen 5,) kit *Therapy completed   • Continuous Blood Gluc Transmit (Dexcom G6 Transmitter) misc *Therapy completed   • desvenlafaxine (PRISTIQ) 100 MG 24 hr tablet Reorder       Current Medications:   Current Outpatient Medications   Medication Sig Dispense Refill   • desvenlafaxine (PRISTIQ) 50 MG 24 hr tablet Take 1 tablet by mouth Daily. 90 tablet 1   • amLODIPine (NORVASC) 5 MG tablet Take 1 tablet by mouth Daily. 30 tablet 3   • B-D UF III MINI PEN NEEDLES 31G X 5 MM misc USE TO INJECT INSULIN 4 TIMES DAILY 400 each 3   • Blood Pressure Monitoring (5 SERIES BP MONITOR) device      • Continuous Blood Gluc Sensor (Dexcom G6 Sensor) Replace sensor every 10 days.  Dx: E 10.9 9 each 1   • Glucagon (Gvoke HypoPen 2-Pack) 1 MG/0.2ML solution auto-injector Inject 1 mg under the skin into the appropriate area as directed As Needed (hypoglycemia). 0.4 mL 1   • glucose blood test strip USE TO  TEST BLOOD SUGAR 6 TIMES DAILY  ONE TOUCH ULTRA TEST STRIPS 600 each 3   • insulin aspart (NovoLOG FlexPen) 100 UNIT/ML solution pen-injector sc pen INJECT 1 UNIT FOR 7 GRAMS FOR BREAKFAST, 1 UNIT FOR 6 GRAMS FOR LUNCH, 1 UNIT FOR 8 GRAMS FOR DINNER. MAXIMUM DAILY DOSE 50 UNITS 45 mL 3   • insulin degludec (Tresiba FlexTouch) 100 UNIT/ML solution pen-injector injection Inject 15 to 17 units under the skin in the morning e10.8 30 mL 3   • losartan-hydrochlorothiazide (HYZAAR) 100-25 MG per tablet TAKE ONE TABLET BY MOUTH DAILY 90 tablet 3   • MILK THISTLE PO Take  by mouth Daily.     • Multiple Vitamins-Minerals (Multivitamin Adult) chewable tablet Chew.     • ONETOUCH VERIO test strip USE TO TEST BLOOD SUGAR 6 TIMES DAILY 600 each 3   • traZODone (DESYREL) 100 MG tablet TAKE ONE TO TWO TABLETS BY MOUTH EVERY NIGHT AT BEDTIME FOR SLEEP 60 tablet 2   • zolpidem (AMBIEN) 10 MG tablet Take 1 tablet by mouth At Night As Needed for Sleep. 30 tablet 2     No current facility-administered medications for this visit.         Psychological ROS: positive for - anxiety, depression and sleep disturbances  negative for - behavioral disorder, concentration difficulties, decreased libido, disorientation, hallucinations, hostility, irritability, memory difficulties, mood swings, obsessive thoughts, physical abuse or sexual abuse      Physical Exam:   There were no vitals taken for this visit.    Mental Status Exam:   Hygiene:   good  Cooperation:  Cooperative  Eye Contact:  Good  Psychomotor Behavior:  Appropriate  Affect:  Appropriate  Mood: Normal   Hopelessness: Denies  Speech:  Normal  Thought Process:  Goal directed  Thought Content:  Normal  Suicidal:  None  Homicidal:  None  Hallucinations:  None  Delusion:  None  Memory:  Intact  Orientation:  Person, Place, Time and Situation  Reliability:  good  Insight:  Good  Judgement:  Good  Impulse Control:  Good  Physical/Medical Issues:  Yes Diabetes, HTN     Mental Status Exam was  reviewed and compared to 7/25/22 visit and appropriate updates were made.    PHQ-9 Depression Screening  Little interest or pleasure in doing things? (P) 1   Feeling down, depressed, or hopeless? (P) 1   Trouble falling or staying asleep, or sleeping too much? (P) 3   Feeling tired or having little energy? (P) 1   Poor appetite or overeating? (P) 0   Feeling bad about yourself - or that you are a failure or have let yourself or your family down?     Trouble concentrating on things, such as reading the newspaper or watching television?     Moving or speaking so slowly that other people could have noticed? Or the opposite - being so fidgety or restless that you have been moving around a lot more than usual?     Thoughts that you would be better off dead, or of hurting yourself in some way?     PHQ-9 Total Score (P) 6   If you checked off any problems, how difficult have these problems made it for you to do your work, take care of things at home, or get along with other people?             Current every day smoker less than 3 minutes spent counseling Has reduced [place] use    I advised Ivon of the risks of tobacco use.     Lab Results:   No visits with results within 3 Month(s) from this visit.   Latest known visit with results is:   Results Encounter on 07/17/2022   Component Date Value Ref Range Status   • Hemoglobin A1C 08/11/2022 7.4 (H)  4.8 - 5.6 % Final    Comment:          Prediabetes: 5.7 - 6.4           Diabetes: >6.4           Glycemic control for adults with diabetes: <7.0     • Glucose 08/11/2022 187 (H)  65 - 99 mg/dL Final   • BUN 08/11/2022 11  6 - 24 mg/dL Final   • Creatinine 08/11/2022 0.76  0.57 - 1.00 mg/dL Final   • EGFR Result 08/11/2022 90  >59 mL/min/1.73 Final   • BUN/Creatinine Ratio 08/11/2022 14  9 - 23 Final   • Sodium 08/11/2022 135  134 - 144 mmol/L Final   • Potassium 08/11/2022 3.8  3.5 - 5.2 mmol/L Final   • Chloride 08/11/2022 97  96 - 106 mmol/L Final   • Total CO2 08/11/2022  21  20 - 29 mmol/L Final   • Calcium 08/11/2022 9.6  8.7 - 10.2 mg/dL Final   • Total Cholesterol 08/11/2022 221 (H)  100 - 199 mg/dL Final   • Triglycerides 08/11/2022 99  0 - 149 mg/dL Final   • HDL Cholesterol 08/11/2022 77  >39 mg/dL Final   • VLDL Cholesterol Anthony 08/11/2022 17  5 - 40 mg/dL Final   • LDL Chol Calc (NIH) 08/11/2022 127 (H)  0 - 99 mg/dL Final   • TSH 08/11/2022 1.120  0.450 - 4.500 uIU/mL Final   • 25 Hydroxy, Vitamin D 08/11/2022 50.4  30.0 - 100.0 ng/mL Final    Comment: Vitamin D deficiency has been defined by the [place] of  Medicine and an Endocrine Society practice guideline as a  level of serum 25-OH vitamin D less than 20 ng/mL (1,2).  The Endocrine Society went on to further define vitamin D  insufficiency as a level between 21 and 29 ng/mL (2).  1. IOM ([place] of Medicine). 2010. Dietary reference     intakes for calcium and D. [place]: The     National Academies Press.  2. Cat MF, [place], Donna TORRES, et al.     Evaluation, treatment, and prevention of vitamin D     deficiency: an Endocrine Society clinical practice     guideline. JCEM. 2011 Jul; 96(7):1911-30.     • Vitamin B-12 08/11/2022 478  232 - 1,245 pg/mL Final   • Folate 08/11/2022 >20.0  >3.0 ng/mL Final    Comment: A serum folate concentration of less than 3.1 ng/mL is  considered to represent clinical deficiency.     • Free T4 08/11/2022 1.27  0.82 - 1.77 ng/dL Final   • Interpretation 08/11/2022 Note   Final    Supplemental report is available.       Assessment & Plan   Problems Addressed this Visit        Mental Health    LAVINIA (generalized anxiety disorder) - Primary (Chronic)    Relevant Medications    desvenlafaxine (PRISTIQ) 50 MG 24 hr tablet    Depression (Chronic)    Relevant Medications    desvenlafaxine (PRISTIQ) 50 MG 24 hr tablet       Sleep    Insomnia (Chronic)   Other Visit Diagnoses     Anxiety        Relevant Medications    desvenlafaxine (PRISTIQ) 50 MG 24 hr tablet      Diagnoses        Codes Comments    LAVINIA (generalized anxiety disorder)    -  Primary ICD-10-CM: F41.1  ICD-9-CM: 300.02     Mild episode of recurrent major depressive disorder (HCC)     ICD-10-CM: F33.0  ICD-9-CM: 296.31     Psychophysiological insomnia     ICD-10-CM: F51.04  ICD-9-CM: 307.42     Anxiety     ICD-10-CM: F41.9  ICD-9-CM: 300.00           Visit Diagnoses:    ICD-10-CM ICD-9-CM   1. LAVINIA (generalized anxiety disorder)  F41.1 300.02   2. Mild episode of recurrent major depressive disorder (HCC)  F33.0 296.31   3. Psychophysiological insomnia  F51.04 307.42   4. Anxiety  F41.9 300.00       TREATMENT PLAN/GOALS: Continue supportive psychotherapy efforts and medications as indicated. Treatment and medication options discussed during today's visit. Patient ackowledged and verbally consented to continue with current treatment plan and was educated on the importance of compliance with treatment and follow-up appointments.    MEDICATION ISSUES:  INSPECT reviewed as expected  Discussed medication options and treatment plan of prescribed medication as well as the risks, benefits, and side effects including potential falls, possible impaired driving and metabolic adversities among others. Patient is agreeable to call the office with any worsening of symptoms or onset of side effects. Patient is agreeable to call 911 or go to the nearest ER should he/she begin having SI/HI. No medication side effects or related complaints today.     Patient feeling a lot better lately[place] doing well on current meds.   Continue Pristiq 100 mg daily for depression and anxiety  Continue Trazodone 100mg tab, take one or two at bedtime for sleep  Continue Ambien 10 mg nightly PRN sleep.      MEDS ORDERED DURING VISIT:  New Medications Ordered This Visit   Medications   • desvenlafaxine (PRISTIQ) 50 MG 24 hr tablet     Sig: Take 1 tablet by mouth Daily.     Dispense:  90 tablet     Refill:  1     Please fill, pt  lost her med. May pay out of pocket.       Return in about 4 months (around 3/17/2023) for video visit.         This document has been electronically signed by Tracy Conti PA-C  November 21, 2022 20:41 EST    Part of this note may be an electronic transcription/translation of spoken language to printed text using the Dragon Dictation System.

## 2022-11-17 NOTE — TELEPHONE ENCOUNTER
Rx Refill Note  Requested Prescriptions     Pending Prescriptions Disp Refills   • zolpidem (AMBIEN) 10 MG tablet 30 tablet 2     Sig: Take 1 tablet by mouth At Night As Needed for Sleep.      Last office visit with prescribing clinician: 4/18/2022      Next office visit with prescribing clinician: 3/23/2023            Louann Orellana MA  11/17/22, 13:58 EST

## 2022-11-21 DIAGNOSIS — E10.43 TYPE 1 DIABETES MELLITUS WITH DIABETIC AUTONOMIC NEUROPATHY: ICD-10-CM

## 2022-11-21 DIAGNOSIS — E78.2 HYPERLIPEMIA, MIXED: ICD-10-CM

## 2022-11-21 LAB
ALBUMIN SERPL-MCNC: 4.4 G/DL (ref 3.5–5.2)
ALBUMIN/GLOB SERPL: 2 G/DL
ALP SERPL-CCNC: 101 U/L (ref 39–117)
ALT SERPL-CCNC: 12 U/L (ref 1–33)
AST SERPL-CCNC: 21 U/L (ref 1–32)
BILIRUB SERPL-MCNC: 0.3 MG/DL (ref 0–1.2)
BUN SERPL-MCNC: 9 MG/DL (ref 6–20)
BUN/CREAT SERPL: 11.4 (ref 7–25)
CALCIUM SERPL-MCNC: 9.7 MG/DL (ref 8.6–10.5)
CHLORIDE SERPL-SCNC: 98 MMOL/L (ref 98–107)
CHOLEST SERPL-MCNC: 189 MG/DL (ref 0–200)
CO2 SERPL-SCNC: 31.5 MMOL/L (ref 22–29)
CREAT SERPL-MCNC: 0.79 MG/DL (ref 0.57–1)
EGFRCR SERPLBLD CKD-EPI 2021: 86.3 ML/MIN/1.73
GLOBULIN SER CALC-MCNC: 2.2 GM/DL
GLUCOSE SERPL-MCNC: 102 MG/DL (ref 65–99)
HBA1C MFR BLD: 7.7 % (ref 4.8–5.6)
HDLC SERPL-MCNC: 82 MG/DL (ref 40–60)
IMP & REVIEW OF LAB RESULTS: NORMAL
LDLC SERPL CALC-MCNC: 92 MG/DL (ref 0–100)
POTASSIUM SERPL-SCNC: 4.2 MMOL/L (ref 3.5–5.2)
PROT SERPL-MCNC: 6.6 G/DL (ref 6–8.5)
SODIUM SERPL-SCNC: 136 MMOL/L (ref 136–145)
T4 FREE SERPL-MCNC: 1.11 NG/DL (ref 0.93–1.7)
TRIGL SERPL-MCNC: 81 MG/DL (ref 0–150)
TSH SERPL DL<=0.005 MIU/L-ACNC: 1.85 UIU/ML (ref 0.27–4.2)
UNABLE TO VOID: NORMAL
VLDLC SERPL CALC-MCNC: 15 MG/DL (ref 5–40)

## 2022-12-01 ENCOUNTER — OFFICE VISIT (OUTPATIENT)
Dept: ENDOCRINOLOGY | Age: 57
End: 2022-12-01

## 2022-12-01 VITALS
SYSTOLIC BLOOD PRESSURE: 104 MMHG | DIASTOLIC BLOOD PRESSURE: 70 MMHG | BODY MASS INDEX: 22.66 KG/M2 | WEIGHT: 136 LBS | TEMPERATURE: 98 F | OXYGEN SATURATION: 97 % | HEART RATE: 91 BPM | HEIGHT: 65 IN

## 2022-12-01 DIAGNOSIS — Z79.4 LONG-TERM INSULIN USE: ICD-10-CM

## 2022-12-01 DIAGNOSIS — E10.43 TYPE 1 DIABETES MELLITUS WITH DIABETIC AUTONOMIC NEUROPATHY: Primary | ICD-10-CM

## 2022-12-01 DIAGNOSIS — E78.2 HYPERLIPEMIA, MIXED: ICD-10-CM

## 2022-12-01 DIAGNOSIS — Z23 NEED FOR IMMUNIZATION AGAINST INFLUENZA: ICD-10-CM

## 2022-12-01 PROCEDURE — 90471 IMMUNIZATION ADMIN: CPT | Performed by: INTERNAL MEDICINE

## 2022-12-01 PROCEDURE — 99214 OFFICE O/P EST MOD 30 MIN: CPT | Performed by: INTERNAL MEDICINE

## 2022-12-01 PROCEDURE — 90686 IIV4 VACC NO PRSV 0.5 ML IM: CPT | Performed by: INTERNAL MEDICINE

## 2022-12-01 RX ORDER — ATORVASTATIN CALCIUM 20 MG/1
TABLET, FILM COATED ORAL
COMMUNITY
Start: 2022-10-19 | End: 2023-01-18

## 2022-12-01 RX ORDER — NAPROXEN 375 MG/1
TABLET ORAL
COMMUNITY
Start: 2022-10-25 | End: 2022-12-09

## 2022-12-01 RX ORDER — IBUPROFEN 800 MG/1
TABLET ORAL
COMMUNITY
Start: 2022-11-16 | End: 2022-12-09 | Stop reason: SDUPTHER

## 2022-12-01 RX ORDER — IBANDRONATE SODIUM 150 MG/1
TABLET, FILM COATED ORAL
COMMUNITY
Start: 2022-09-06

## 2022-12-01 NOTE — PROGRESS NOTES
"Chief Complaint  Chief Complaint   Patient presents with   • Diabetes     Type 1: Pt does have meter, is up to date on eye exam, no hx of retinopathy or neuropathy.        Subjective          History of Present Illness    Ivon Ortega 57 y.o. presents with Type 1 dm as a F/u    Type 1 dm -   Diagnosed for about 15 years +  Today in clinic pt reports being on Tresiba 15 units at bed time, novolog 1 unit - 6  gm if carbs, 1 unit for 50 over 140  Avg bg - 171 mg/dl.  Checks BG - 4 - 5 times x day   Insulin pump - x  Sensor - x  Dm retinopathy - x,Last eye exam - Aug - 2022  Dm nephropathy - x  Dm neuropathy - yes,Dm neuropathy meds - x meds  CAD -x  CVA -x  Episodes of hypoglycemia - yes  Pt is physically active. weight has been stable.   Pt tries to follow DM diet for most part.   Prior DKA episodes - 2017    #Patient is interested in OmniPod and Dexcom, but apparently the devices are extremely expensive.  Was also requesting for medical disability which I reported to the patient that she needs to get this through her primary care.    Reviewed primary care physician's/consulting physician documentation and lab results         I have reviewed the patient's allergies, medicines, past medical hx, family hx and social hx in detail.    Objective   Vital Signs:   /70   Pulse 91   Temp 98 °F (36.7 °C) (Temporal)   Ht 165.1 cm (65\")   Wt 61.7 kg (136 lb)   SpO2 97%   BMI 22.63 kg/m²   Physical Exam   General appearance - no distress  Eyes- anicteric sclera  Ear nose and throat-external ears and nose normal.    Respiratory-normal chest on inspection.  No respiratory distress noted.  Skin-no rashes.  Neuro-alert and oriented x3            Result Review :   The following data was reviewed by: Shyam Zuniga MD on 12/01/2022:  Orders Only on 11/21/2022   Component Date Value Ref Range Status   • Total Cholesterol 11/21/2022 189  0 - 200 mg/dL Final    Comment: Cholesterol Reference Ranges  (U.S. Department of " Health and Human Services ATP III  Classifications)  Desirable          <200 mg/dL  Borderline High    200-239 mg/dL  High Risk          >240 mg/dL  Triglyceride Reference Ranges  (U.S. Department of Health and Human Services ATP III  Classifications)  Normal           <150 mg/dL  Borderline High  150-199 mg/dL  High             200-499 mg/dL  Very High        >500 mg/dL  HDL Reference Ranges  (U.S. Department of Health and Human Services ATP III  Classifications)  Low     <40 mg/dl (major risk factor for CHD)  High    >60 mg/dl ('negative' risk factor for CHD)  LDL Reference Ranges  (U.S. Department of Health and Human Services ATP III  Classifications)  Optimal          <100 mg/dL  Near Optimal     100-129 mg/dL  Borderline High  130-159 mg/dL  High             160-189 mg/dL  Very High        >189 mg/dL     • Triglycerides 11/21/2022 81  0 - 150 mg/dL Final   • HDL Cholesterol 11/21/2022 82 (H)  40 - 60 mg/dL Final   • VLDL Cholesterol Anthony 11/21/2022 15  5 - 40 mg/dL Final   • LDL Chol Calc (NIH) 11/21/2022 92  0 - 100 mg/dL Final   • Glucose 11/21/2022 102 (H)  65 - 99 mg/dL Final   • BUN 11/21/2022 9  6 - 20 mg/dL Final   • Creatinine 11/21/2022 0.79  0.57 - 1.00 mg/dL Final   • EGFR Result 11/21/2022 86.3  >60.0 mL/min/1.73 Final    Comment: National Kidney Foundation and American Society of  Nephrology (ASN) Task Force recommended calculation based  on the Chronic Kidney Disease Epidemiology Collaboration  (CKD-EPI) equation refit without adjustment for race.  GFR Normal >60  Chronic Kidney Disease <60  Kidney Failure <15     • BUN/Creatinine Ratio 11/21/2022 11.4  7.0 - 25.0 Final   • Sodium 11/21/2022 136  136 - 145 mmol/L Final   • Potassium 11/21/2022 4.2  3.5 - 5.2 mmol/L Final   • Chloride 11/21/2022 98  98 - 107 mmol/L Final   • Total CO2 11/21/2022 31.5 (H)  22.0 - 29.0 mmol/L Final   • Calcium 11/21/2022 9.7  8.6 - 10.5 mg/dL Final   • Total Protein 11/21/2022 6.6  6.0 - 8.5 g/dL Final   • Albumin  11/21/2022 4.40  3.50 - 5.20 g/dL Final   • Globulin 11/21/2022 2.2  gm/dL Final   • A/G Ratio 11/21/2022 2.0  g/dL Final   • Total Bilirubin 11/21/2022 0.3  0.0 - 1.2 mg/dL Final   • Alkaline Phosphatase 11/21/2022 101  39 - 117 U/L Final   • AST (SGOT) 11/21/2022 21  1 - 32 U/L Final   • ALT (SGPT) 11/21/2022 12  1 - 33 U/L Final   • Hemoglobin A1C 11/21/2022 7.70 (H)  4.80 - 5.60 % Final    Comment: Hemoglobin A1C Ranges:  Increased Risk for Diabetes  5.7% to 6.4%  Diabetes                     >= 6.5%  Diabetic Goal                < 7.0%     • Free T4 11/21/2022 1.11  0.93 - 1.70 ng/dL Final    Results may be falsely increased if patient taking Biotin.   • TSH 11/21/2022 1.850  0.270 - 4.200 uIU/mL Final   • Interpretation 11/21/2022 Note   Final    Supplemental report is available.   • Unable to Void 11/21/2022 Comment   Final    Comment: The patient was not able to render a urine sample and has been  instructed to return for a urine collection at their earliest  convenience.  The urine testing that you have requested has  been deleted from this report.  When the patient returns and  provides a urine specimen, the urine testing will be performed  and separately reported.       Data reviewed: PCP and endocrine notes       Results Review:    I reviewed the patient's new clinical results.     Assessment and Plan    Problem List Items Addressed This Visit        Other    Type 1 diabetes mellitus (HCC) - Primary (Chronic)    Relevant Orders    TSH    T4, Free    Basic Metabolic Panel    Hemoglobin A1c    Lipid Panel    Vitamin B12 & Folate    Vitamin D,25-Hydroxy   Other Visit Diagnoses     Hyperlipemia, mixed        Relevant Medications    atorvastatin (LIPITOR) 20 MG tablet    Other Relevant Orders    TSH    T4, Free    Basic Metabolic Panel    Hemoglobin A1c    Lipid Panel    Vitamin B12 & Folate    Vitamin D,25-Hydroxy    Long-term insulin use (HCC)        Relevant Orders    TSH    T4, Free    Basic Metabolic  "Panel    Hemoglobin A1c    Lipid Panel    Vitamin B12 & Folate    Vitamin D,25-Hydroxy        Type 1 diabetes mellitus-uncontrolled with hyperglycemia  HbA1c is worse since last visit  Increase Tresiba to 16-17 units daily  Continue NovoLog at the current dosage.    Hyperlipidemia  Continue Lipitor 20 mg oral daily.      Interpreted the blood work-up/imaging results performed by the primary care/consulting physician -    Refills sent to pharmacy    Follow Up     Patient was given instructions and counseling regarding her condition or for health maintenance advice. Please see specific information pulled into the AVS if appropriate.       Thank you for asking me to see your patient, Ivon Ortega in consultation.         Shyam Zuniga MD  12/01/22      EMR Dragon / transcription disclaimer:     \"Dictated utilizing Dragon dictation\".               "

## 2022-12-01 NOTE — PROGRESS NOTES
Injection  Injection performed by Ilana Isidro. Patient tolerated the procedure well without complications.  12/01/22   Ilana Isidro

## 2022-12-07 ENCOUNTER — TELEPHONE (OUTPATIENT)
Dept: INTERNAL MEDICINE | Facility: CLINIC | Age: 57
End: 2022-12-07

## 2022-12-07 NOTE — TELEPHONE ENCOUNTER
Caller: Luana Ortega    Relationship: Self    Best call back number: [phone] (Mobile)    What is the best time to reach you: ANYTIME, ASAP    Who are you requesting to speak with (clinical staff, provider,  specific staff member): CLINICAL STAFF    Do you know the name of the person who called: LUANA ORTEGA    What was the call regarding: PATIENT IS ASKING FOR A CALL BACK WHEN THE RIB AND PELVIS XRAYS FROM [facility] IS RECEIVED- PATIENT STATES IT IS GOING TO BE FAXED OVER TODAY 12/07/2022 FROM THAT LOCATION AND SHE WANTS TO MAKE SURE IT IS RECEIVED BEFORE HER APPT ON 12/09/2022, PLEASE CALL PATIENT BACK ASAP    Do you require a callback: YES, ASAP

## 2022-12-09 ENCOUNTER — OFFICE VISIT (OUTPATIENT)
Dept: INTERNAL MEDICINE | Facility: CLINIC | Age: 57
End: 2022-12-09

## 2022-12-09 VITALS
DIASTOLIC BLOOD PRESSURE: 89 MMHG | HEART RATE: 78 BPM | HEIGHT: 65 IN | BODY MASS INDEX: 22.02 KG/M2 | SYSTOLIC BLOOD PRESSURE: 136 MMHG | TEMPERATURE: 98.4 F | WEIGHT: 132.2 LBS | OXYGEN SATURATION: 98 %

## 2022-12-09 DIAGNOSIS — S22.31XD CLOSED FRACTURE OF ONE RIB OF RIGHT SIDE WITH ROUTINE HEALING: ICD-10-CM
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
## 2022-12-09 PROCEDURE — 99214 OFFICE O/P EST MOD 30 MIN: CPT | Performed by: NURSE PRACTITIONER

## 2022-12-09 RX ORDER — LOSARTAN POTASSIUM 100 MG/1
TABLET ORAL
COMMUNITY
End: 2023-01-11

## 2022-12-09 RX ORDER — METAXALONE 800 MG/1
800 TABLET ORAL 3 TIMES DAILY PRN
Qty: 30 TABLET | Refills: 0 | Status: SHIPPED | OUTPATIENT
Start: 2022-12-09 | End: 2022-12-19

## 2022-12-09 RX ORDER — IBUPROFEN 800 MG/1
800 TABLET ORAL EVERY 8 HOURS PRN
Qty: 30 TABLET | Refills: 0 | Status: SHIPPED | OUTPATIENT
Start: 2022-12-09 | End: 2022-12-20

## 2022-12-09 RX ORDER — AMITRIPTYLINE HYDROCHLORIDE 100 MG/1
TABLET, FILM COATED ORAL
COMMUNITY
End: 2023-02-23

## 2022-12-09 NOTE — ASSESSMENT & PLAN NOTE
Use skelaxin PRN.   Okay to continue with NSAIDS PRN.  Work note given for time to recuperate.   Advised if patient needs more time, or is not healing well, to return to office.

## 2022-12-09 NOTE — ASSESSMENT & PLAN NOTE
Recommend ibuprofen 600-800mg TID; eat before taking medication.   Lidocaine patch OTC applied to rib region.   Use pillow for counter pressure when having to cough and deep breath.

## 2022-12-16 ENCOUNTER — TELEPHONE (OUTPATIENT)
Dept: INTERNAL MEDICINE | Facility: CLINIC | Age: 57
End: 2022-12-16

## 2022-12-16 ENCOUNTER — OFFICE VISIT (OUTPATIENT)
Dept: INTERNAL MEDICINE | Facility: CLINIC | Age: 57
End: 2022-12-16

## 2022-12-16 VITALS
BODY MASS INDEX: 23.13 KG/M2 | TEMPERATURE: 98 F | SYSTOLIC BLOOD PRESSURE: 123 MMHG | OXYGEN SATURATION: 97 % | WEIGHT: 138.8 LBS | DIASTOLIC BLOOD PRESSURE: 80 MMHG | HEIGHT: 65 IN | HEART RATE: 79 BPM

## 2022-12-16 DIAGNOSIS — S22.31XD CLOSED FRACTURE OF ONE RIB OF RIGHT SIDE WITH ROUTINE HEALING: Primary | ICD-10-CM
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
## 2022-12-16 PROCEDURE — 99214 OFFICE O/P EST MOD 30 MIN: CPT | Performed by: NURSE PRACTITIONER

## 2022-12-16 RX ORDER — ATORVASTATIN CALCIUM 20 MG/1
20 TABLET, FILM COATED ORAL
COMMUNITY
Start: 2022-12-05 | End: 2023-01-11

## 2022-12-16 RX ORDER — OXYMETAZOLINE HYDROCHLORIDE 0.05 G/100ML
SPRAY NASAL
COMMUNITY
Start: 2022-12-05

## 2022-12-16 RX ORDER — TRAMADOL HYDROCHLORIDE 50 MG/1
50 TABLET ORAL EVERY 8 HOURS PRN
Qty: 6 TABLET | Refills: 0 | Status: SHIPPED | OUTPATIENT
Start: 2022-12-16 | End: 2022-12-19

## 2022-12-16 NOTE — TELEPHONE ENCOUNTER
Pharmacy Name:  [facility]     Pharmacy representative name: HÉCTOR    Pharmacy representative phone number: [phone]    What medication are you calling in regards to: traMADol (ULTRAM) 50 MG tablet / metaxalone (Skelaxin) 800 MG tablet    What question does the pharmacy have: PHARMACY CALLING STATING THAT THESE MEDICATION HAVE A MAJOR DRUG INTERACTION. PHARMACY WOULD LIKE TO KNOW IF  THE TRAMADOL STILL NEEDS TO BE FILLED    Who is the provider that prescribed the medication: NEHEMIAS WILL    Additional notes: PLEASE ADVISE

## 2022-12-16 NOTE — ASSESSMENT & PLAN NOTE
Continue with pain management PRN.   Continue with activity as tolerated.   Ice/heat per patient's preference.   Recommended light duty at work until 1/6/23.

## 2022-12-16 NOTE — ASSESSMENT & PLAN NOTE
Tramal TID PRN.   Continue with ibuprofen 600mg-800mg TID PRN.   Continue with lido patches PRN.   Cont with deep breathing exercises.

## 2022-12-16 NOTE — TELEPHONE ENCOUNTER
PATIENT CALLED STATING THAT THE NOTE FOR HER EMPLOYER THAT WAS SENT ON 12/16/22 NEEDS TO HAVE ADDITIONAL INFORMATION.    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    SHE WOULD LIKE TO HAVE THIS FAXED TO HER EMPLOYER AS WELL AS UPLOADED TO HER [employer].    FAX:  [phone]  ATTN: FATOU NOBLE    PLEASE ADVISE  [phone]
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
## 2022-12-20 DIAGNOSIS — S22.31XD CLOSED FRACTURE OF ONE RIB OF RIGHT SIDE WITH ROUTINE HEALING: ICD-10-CM
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
## 2022-12-20 RX ORDER — IBUPROFEN 800 MG/1
TABLET ORAL
Qty: 30 TABLET | Refills: 0 | Status: SHIPPED | OUTPATIENT
Start: 2022-12-20

## 2023-01-11 ENCOUNTER — OFFICE VISIT (OUTPATIENT)
Dept: INTERNAL MEDICINE | Facility: CLINIC | Age: 58
End: 2023-01-11
Payer: COMMERCIAL

## 2023-01-11 VITALS
HEIGHT: 65 IN | WEIGHT: 134.8 LBS | TEMPERATURE: 97.8 F | DIASTOLIC BLOOD PRESSURE: 71 MMHG | SYSTOLIC BLOOD PRESSURE: 107 MMHG | HEART RATE: 81 BPM | BODY MASS INDEX: 22.46 KG/M2 | OXYGEN SATURATION: 98 %

## 2023-01-11 DIAGNOSIS — F51.04 PSYCHOPHYSIOLOGICAL INSOMNIA: Chronic | ICD-10-CM

## 2023-01-11 DIAGNOSIS — F41.1 GAD (GENERALIZED ANXIETY DISORDER): Chronic | ICD-10-CM

## 2023-01-11 DIAGNOSIS — M81.0 OSTEOPOROSIS, POSTMENOPAUSAL: Chronic | ICD-10-CM

## 2023-01-11 DIAGNOSIS — E78.2 MIXED HYPERLIPIDEMIA: Chronic | ICD-10-CM

## 2023-01-11 DIAGNOSIS — E10.65 TYPE 1 DIABETES MELLITUS WITH HYPERGLYCEMIA: Chronic | ICD-10-CM

## 2023-01-11 DIAGNOSIS — I10 ESSENTIAL HYPERTENSION: Chronic | ICD-10-CM

## 2023-01-11 DIAGNOSIS — F33.0 MILD EPISODE OF RECURRENT MAJOR DEPRESSIVE DISORDER: Chronic | ICD-10-CM

## 2023-01-11 DIAGNOSIS — S22.31XD CLOSED FRACTURE OF ONE RIB OF RIGHT SIDE WITH ROUTINE HEALING: ICD-10-CM

## 2023-01-11 DIAGNOSIS — Z00.00 ANNUAL PHYSICAL EXAM: Primary | ICD-10-CM

## 2023-01-11 PROBLEM — E87.6 HYPOKALEMIA: Status: RESOLVED | Noted: 2020-07-14 | Resolved: 2023-01-11

## 2023-01-11 PROBLEM — R07.9 CHEST PAIN: Status: RESOLVED | Noted: 2020-07-14 | Resolved: 2023-01-11

## 2023-01-11 PROBLEM — Z96.41 INSULIN PUMP STATUS: Status: RESOLVED | Noted: 2017-08-25 | Resolved: 2023-01-11

## 2023-01-11 LAB
BASOPHILS # BLD AUTO: 0.02 10*3/MM3 (ref 0–0.2)
BASOPHILS NFR BLD AUTO: 0.3 % (ref 0–1.5)
EOSINOPHIL # BLD AUTO: 0.12 10*3/MM3 (ref 0–0.4)
EOSINOPHIL NFR BLD AUTO: 2 % (ref 0.3–6.2)
ERYTHROCYTE [DISTWIDTH] IN BLOOD BY AUTOMATED COUNT: 11.8 % (ref 12.3–15.4)
HCT VFR BLD AUTO: 34.3 % (ref 34–46.6)
HGB BLD-MCNC: 11.6 G/DL (ref 12–15.9)
IMM GRANULOCYTES # BLD AUTO: 0.01 10*3/MM3 (ref 0–0.05)
IMM GRANULOCYTES NFR BLD AUTO: 0.2 % (ref 0–0.5)
LYMPHOCYTES # BLD AUTO: 2.08 10*3/MM3 (ref 0.7–3.1)
LYMPHOCYTES NFR BLD AUTO: 35.2 % (ref 19.6–45.3)
MCH RBC QN AUTO: 31.9 PG (ref 26.6–33)
MCHC RBC AUTO-ENTMCNC: 33.8 G/DL (ref 31.5–35.7)
MCV RBC AUTO: 94.2 FL (ref 79–97)
MONOCYTES # BLD AUTO: 0.53 10*3/MM3 (ref 0.1–0.9)
MONOCYTES NFR BLD AUTO: 9 % (ref 5–12)
NEUTROPHILS # BLD AUTO: 3.15 10*3/MM3 (ref 1.7–7)
NEUTROPHILS NFR BLD AUTO: 53.3 % (ref 42.7–76)
NRBC BLD AUTO-RTO: 0 /100 WBC (ref 0–0.2)
PLATELET # BLD AUTO: 255 10*3/MM3 (ref 140–450)
RBC # BLD AUTO: 3.64 10*6/MM3 (ref 3.77–5.28)
WBC # BLD AUTO: 5.91 10*3/MM3 (ref 3.4–10.8)

## 2023-01-11 PROCEDURE — 90715 TDAP VACCINE 7 YRS/> IM: CPT | Performed by: NURSE PRACTITIONER

## 2023-01-11 PROCEDURE — 99212 OFFICE O/P EST SF 10 MIN: CPT | Performed by: NURSE PRACTITIONER

## 2023-01-11 PROCEDURE — 90471 IMMUNIZATION ADMIN: CPT | Performed by: NURSE PRACTITIONER

## 2023-01-11 PROCEDURE — 99396 PREV VISIT EST AGE 40-64: CPT | Performed by: NURSE PRACTITIONER

## 2023-01-11 NOTE — ASSESSMENT & PLAN NOTE
Recommended 150-300 minutes weekly exercise.   Patient continues on diabetic diet.   Labs reviewed; CBC today.   Mammogram/dexa scan UTD.   Pap smear UTD.   Anticipatory guidance given regarding health prevention/wellness, diet/exercise, tobacco/alcohol/drug education, exercise and wellbeing, covid 19 guidance, and sexual health/STD education.

## 2023-01-11 NOTE — LETTER
January 11, 2023     Patient: Ivon Ortega   YOB: 1965   Date of Visit: 1/11/2023       To Whom It May Concern:    It is my medical opinion that Ivon Ortega may return to full duty immediately with no restrictions.  Patient was seen in my office on 1/11/23.        Sincerely,        FARIHA Sanabria    CC: No Recipients
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
## 2023-01-11 NOTE — ASSESSMENT & PLAN NOTE
Okay to proceed to regular duty at work.   Advised to use proper body mechanics.   Okay to use ibuprofen PRN for pain control.

## 2023-01-12 DIAGNOSIS — D64.9 LOW HEMOGLOBIN: Primary | ICD-10-CM

## 2023-01-12 NOTE — PROGRESS NOTES
Please ask Mrs. Ortega to return to the lab for further lab work where I want to investigate if we need to be on iron supplementation. Her cbc is down just a bit.

## 2023-01-19 RX ORDER — ATORVASTATIN CALCIUM 20 MG/1
TABLET, FILM COATED ORAL
Qty: 90 TABLET | Refills: 1 | Status: SHIPPED | OUTPATIENT
Start: 2023-01-19

## 2023-02-21 ENCOUNTER — TELEPHONE (OUTPATIENT)
Dept: INTERNAL MEDICINE | Facility: CLINIC | Age: 58
End: 2023-02-21
Payer: COMMERCIAL

## 2023-02-23 DIAGNOSIS — G47.00 INSOMNIA, UNSPECIFIED TYPE: ICD-10-CM

## 2023-02-23 RX ORDER — TRAZODONE HYDROCHLORIDE 100 MG/1
TABLET ORAL
Qty: 60 TABLET | Refills: 2 | Status: SHIPPED | OUTPATIENT
Start: 2023-02-23

## 2023-02-27 ENCOUNTER — TELEPHONE (OUTPATIENT)
Dept: INTERNAL MEDICINE | Facility: CLINIC | Age: 58
End: 2023-02-27

## 2023-02-27 DIAGNOSIS — F51.04 PSYCHOPHYSIOLOGICAL INSOMNIA: Chronic | ICD-10-CM

## 2023-02-27 NOTE — TELEPHONE ENCOUNTER
Hub staff attempted to follow warm transfer process and was unsuccessful     Caller: Ivon Ortega    Relationship to patient: Self    Best call back number: [phone]    Patient is needing: PATIENT NEEDS LAB APPT
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
## 2023-02-28 DIAGNOSIS — S22.31XD CLOSED FRACTURE OF ONE RIB OF RIGHT SIDE WITH ROUTINE HEALING: ICD-10-CM

## 2023-02-28 RX ORDER — ZOLPIDEM TARTRATE 10 MG/1
TABLET ORAL
Qty: 30 TABLET | Refills: 0 | Status: SHIPPED | OUTPATIENT
Start: 2023-02-28 | End: 2023-03-23 | Stop reason: ALTCHOICE

## 2023-03-01 RX ORDER — METAXALONE 800 MG/1
TABLET ORAL
Qty: 30 TABLET | Refills: 0 | OUTPATIENT
Start: 2023-03-01

## 2023-03-01 RX ORDER — TRAMADOL HYDROCHLORIDE 50 MG/1
TABLET ORAL
Qty: 6 TABLET | OUTPATIENT
Start: 2023-03-01

## 2023-03-07 ENCOUNTER — TELEPHONE (OUTPATIENT)
Dept: INTERNAL MEDICINE | Facility: CLINIC | Age: 58
End: 2023-03-07

## 2023-03-13 ENCOUNTER — TELEPHONE (OUTPATIENT)
Dept: INTERNAL MEDICINE | Facility: CLINIC | Age: 58
End: 2023-03-13
Payer: COMMERCIAL

## 2023-03-23 ENCOUNTER — TELEMEDICINE (OUTPATIENT)
Dept: PSYCHIATRY | Facility: CLINIC | Age: 58
End: 2023-03-23
Payer: COMMERCIAL

## 2023-03-23 DIAGNOSIS — Z79.899 LONG-TERM CURRENT USE OF BENZODIAZEPINE: Chronic | ICD-10-CM

## 2023-03-23 DIAGNOSIS — F41.1 GAD (GENERALIZED ANXIETY DISORDER): Primary | Chronic | ICD-10-CM

## 2023-03-23 DIAGNOSIS — F51.04 PSYCHOPHYSIOLOGICAL INSOMNIA: Primary | ICD-10-CM

## 2023-03-23 DIAGNOSIS — F51.04 PSYCHOPHYSIOLOGICAL INSOMNIA: Chronic | ICD-10-CM

## 2023-03-23 PROCEDURE — 99214 OFFICE O/P EST MOD 30 MIN: CPT | Performed by: PHYSICIAN ASSISTANT

## 2023-03-23 RX ORDER — TEMAZEPAM 30 MG/1
30 CAPSULE ORAL NIGHTLY PRN
Qty: 30 CAPSULE | Refills: 0 | Status: SHIPPED | OUTPATIENT
Start: 2023-03-23
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
## 2023-03-27 DIAGNOSIS — I10 ESSENTIAL HYPERTENSION: ICD-10-CM

## 2023-03-27 RX ORDER — AMLODIPINE BESYLATE 5 MG/1
TABLET ORAL
Qty: 30 TABLET | Refills: 3 | Status: SHIPPED | OUTPATIENT
Start: 2023-03-27

## 2023-04-17 DIAGNOSIS — S22.31XD CLOSED FRACTURE OF ONE RIB OF RIGHT SIDE WITH ROUTINE HEALING: ICD-10-CM

## 2023-04-17 DIAGNOSIS — F51.04 PSYCHOPHYSIOLOGICAL INSOMNIA: ICD-10-CM

## 2023-04-17 DIAGNOSIS — F41.9 ANXIETY: ICD-10-CM
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
## 2023-04-17 RX ORDER — TEMAZEPAM 30 MG/1
CAPSULE ORAL
Qty: 30 CAPSULE | OUTPATIENT
Start: 2023-04-17

## 2023-04-17 RX ORDER — ZOLPIDEM TARTRATE 10 MG/1
TABLET ORAL
Qty: 30 TABLET | OUTPATIENT
Start: 2023-04-17

## 2023-04-17 RX ORDER — TRAMADOL HYDROCHLORIDE 50 MG/1
TABLET ORAL
Qty: 6 TABLET | OUTPATIENT
Start: 2023-04-17

## 2023-04-17 RX ORDER — DESVENLAFAXINE SUCCINATE 50 MG/1
TABLET, EXTENDED RELEASE ORAL
Qty: 90 TABLET | Refills: 1 | Status: SHIPPED | OUTPATIENT
Start: 2023-04-17

## 2023-04-17 RX ORDER — TEMAZEPAM 30 MG/1
30 CAPSULE ORAL NIGHTLY PRN
Qty: 30 CAPSULE | Refills: 0 | Status: SHIPPED | OUTPATIENT
Start: 2023-04-17

## 2023-04-17 RX ORDER — METAXALONE 800 MG/1
TABLET ORAL
Qty: 30 TABLET | Refills: 0 | OUTPATIENT
Start: 2023-04-17

## 2023-04-17 NOTE — TELEPHONE ENCOUNTER
Patient would need to f/u with PCP if she is still requiring tramadol as it is a controlled substance. I only gave this for a particular event, not for chronic ongoing use.

## 2023-04-18 ENCOUNTER — PRIOR AUTHORIZATION (OUTPATIENT)
Dept: PSYCHIATRY | Facility: CLINIC | Age: 58
End: 2023-04-18
Payer: COMMERCIAL

## 2023-04-24 DIAGNOSIS — F51.04 PSYCHOPHYSIOLOGICAL INSOMNIA: ICD-10-CM

## 2023-04-24 RX ORDER — TEMAZEPAM 30 MG/1
30 CAPSULE ORAL NIGHTLY PRN
Qty: 30 CAPSULE | Refills: 0 | OUTPATIENT
Start: 2023-04-24

## 2023-05-15 ENCOUNTER — TELEPHONE (OUTPATIENT)
Dept: PSYCHIATRY | Facility: CLINIC | Age: 58
End: 2023-05-15
Payer: COMMERCIAL

## 2023-05-15 NOTE — TELEPHONE ENCOUNTER
FYI - patient called cand said that Temazepam is working great for sleep, its helping a lot. She would like to stay on it.        Thank You

## 2023-05-16 DIAGNOSIS — F51.04 PSYCHOPHYSIOLOGICAL INSOMNIA: ICD-10-CM

## 2023-05-16 RX ORDER — TEMAZEPAM 30 MG/1
30 CAPSULE ORAL NIGHTLY PRN
Qty: 30 CAPSULE | Refills: 2 | Status: SHIPPED | OUTPATIENT
Start: 2023-05-16

## 2023-06-13 DIAGNOSIS — E10.43 TYPE 1 DIABETES MELLITUS WITH DIABETIC AUTONOMIC NEUROPATHY: Primary | ICD-10-CM

## 2023-06-13 NOTE — TELEPHONE ENCOUNTER
Caller: Ivon Ortega    Relationship: Self    Best call back number: [phone]    Requested Prescriptions: ALUMNI POD   Requested Prescriptions     Pending Prescriptions Disp Refills    Continuous Blood Gluc Sensor (Dexcom G6 Sensor) 9 each 1     Sig: Replace sensor every 10 days.  Dx: E 10.9        Pharmacy where request should be sent: [facility] [phone]    Last office visit with prescribing clinician: 8/18/2022   Last telemedicine visit with prescribing clinician: Visit date not found   Next office visit with prescribing clinician: 6/19/2023     Additional details provided by patient: PT CALLED STATING THAT HER PHARMACY RECEIVED THE PRE AUTHORIZATION FOR THE ALUMNI POD AND DEXCOM G6 BUT NEEDS TO BE SENT AN ACTUAL PRESCRIPTION AND WOULD LIKE THAT CALLED IN AND SHE GAVE [facility] NUMBER TO CALL SO SHE CAN GET IT IN QUICKER [phone]    Does the patient have less than a 3 day supply:  [x] Yes  [] No    Would you like a call back once the refill request has been completed: [x] Yes [] No    If the office needs to give you a call back, can they leave a voicemail: [x] Yes [] No    Britni Daniels Rep   06/13/23 14:52 EDT

## 2023-06-14 RX ORDER — PROCHLORPERAZINE 25 MG/1
SUPPOSITORY RECTAL
Qty: 9 EACH | Refills: 0 | Status: SHIPPED | OUTPATIENT
Start: 2023-06-14

## 2023-06-18 DIAGNOSIS — G47.00 INSOMNIA, UNSPECIFIED TYPE: ICD-10-CM

## 2023-06-19 ENCOUNTER — OFFICE VISIT (OUTPATIENT)
Dept: ENDOCRINOLOGY | Age: 58
End: 2023-06-19
Payer: COMMERCIAL

## 2023-06-19 VITALS
WEIGHT: 137 LBS | DIASTOLIC BLOOD PRESSURE: 68 MMHG | HEIGHT: 65 IN | BODY MASS INDEX: 22.82 KG/M2 | TEMPERATURE: 96.6 F | OXYGEN SATURATION: 97 % | HEART RATE: 84 BPM | SYSTOLIC BLOOD PRESSURE: 116 MMHG

## 2023-06-19 DIAGNOSIS — E10.65 TYPE 1 DIABETES MELLITUS WITH HYPERGLYCEMIA: Primary | Chronic | ICD-10-CM

## 2023-06-19 DIAGNOSIS — E78.2 MIXED HYPERLIPIDEMIA: Chronic | ICD-10-CM

## 2023-06-19 PROCEDURE — 99214 OFFICE O/P EST MOD 30 MIN: CPT | Performed by: NURSE PRACTITIONER

## 2023-06-19 RX ORDER — INSULIN DEGLUDEC INJECTION 100 U/ML
16 INJECTION, SOLUTION SUBCUTANEOUS DAILY
Qty: 15 ML | Refills: 1 | Status: SHIPPED | OUTPATIENT
Start: 2023-06-19 | End: 2023-06-19 | Stop reason: SDUPTHER

## 2023-06-19 RX ORDER — INSULIN ASPART 100 [IU]/ML
INJECTION, SOLUTION INTRAVENOUS; SUBCUTANEOUS
Qty: 45 ML | Refills: 0 | Status: SHIPPED | OUTPATIENT
Start: 2023-06-19 | End: 2023-06-19 | Stop reason: SDUPTHER

## 2023-06-19 RX ORDER — INSULIN DEGLUDEC INJECTION 100 U/ML
16 INJECTION, SOLUTION SUBCUTANEOUS DAILY
Qty: 15 ML | Refills: 1 | Status: SHIPPED | OUTPATIENT
Start: 2023-06-19 | End: 2023-06-20 | Stop reason: SDUPTHER

## 2023-06-19 RX ORDER — NAPROXEN 375 MG/1
TABLET ORAL
COMMUNITY
Start: 2023-04-18

## 2023-06-19 RX ORDER — GLUCAGON INJECTION, SOLUTION 1 MG/.2ML
1 INJECTION, SOLUTION SUBCUTANEOUS AS NEEDED
Qty: 0.4 ML | Refills: 1 | Status: SHIPPED | OUTPATIENT
Start: 2023-06-19

## 2023-06-19 RX ORDER — INSULIN ASPART 100 [IU]/ML
INJECTION, SOLUTION INTRAVENOUS; SUBCUTANEOUS
Qty: 45 ML | Refills: 0 | Status: SHIPPED | OUTPATIENT
Start: 2023-06-19

## 2023-06-19 RX ORDER — TRAZODONE HYDROCHLORIDE 100 MG/1
TABLET ORAL
Qty: 60 TABLET | Refills: 2 | Status: SHIPPED | OUTPATIENT
Start: 2023-06-19 | End: 2023-06-22

## 2023-06-19 NOTE — PROGRESS NOTES
"Chief Complaint  Diabetes (Testing bs 3-6 x day / last dm eye exam aug 2022)    Subjective        Ivon Ortega presents to [facility]  History of Present Illness    Type 1 dm    Diagnosed for about 15-20 years ago  Today in clinic pt reports being on Tresiba 16 units at bed time, novolog 1u per 6g/carbs u TIDaC, 1 unit for 50 over 140  Checks BG - 4 - 5 times x day   Last eye exam 8/2022  Dm neuropathy - yes  Episodes of hypoglycemia - occasionally   On ARB and statin     Objective   Vital Signs:  /68   Pulse 84   Temp 96.6 °F (35.9 °C) (Temporal)   Ht 165.1 cm (65\")   Wt 62.1 kg (137 lb)   SpO2 97%   BMI 22.80 kg/m²   Estimated body mass index is 22.8 kg/m² as calculated from the following:    Height as of this encounter: 165.1 cm (65\").    Weight as of this encounter: 62.1 kg (137 lb).       BMI is within normal parameters. No other follow-up for BMI required.      Physical Exam  Vitals reviewed.   Constitutional:       General: She is not in acute distress.  HENT:      Head: Normocephalic and atraumatic.   Cardiovascular:      Rate and Rhythm: Normal rate.   Pulmonary:      Effort: Pulmonary effort is normal. No respiratory distress.   Musculoskeletal:         General: No signs of injury. Normal range of motion.      Cervical back: Normal range of motion and neck supple.   Skin:     General: Skin is warm and dry.   Neurological:      Mental Status: She is alert and oriented to person, place, and time. Mental status is at baseline.   Psychiatric:         Mood and Affect: Mood normal.         Behavior: Behavior normal.         Thought Content: Thought content normal.         Judgment: Judgment normal.      Result Review :  The following data was reviewed by: FARIHA Casey on 06/19/2023:  Common labs          11/21/2022    09:27 1/11/2023    09:32 3/20/2023    08:52 3/20/2023    08:53   Common Labs   Glucose 102   165     BUN 9   9     Creatinine 0.79   0.77   "   Sodium 136   136     Potassium 4.2   4.0     Chloride 98   96     Calcium 9.7   9.9     Total Protein 6.6       Albumin 4.40       Total Bilirubin 0.3       Alkaline Phosphatase 101       AST (SGOT) 21       ALT (SGPT) 12       WBC  5.91   4.51    Hemoglobin  11.6   12.2    Hematocrit  34.3   35.6    Platelets  255   264    Total Cholesterol 189   169     Triglycerides 81   198     HDL Cholesterol 82   65     LDL Cholesterol  92   72     Hemoglobin A1C 7.70   7.50                    Assessment and Plan   Diagnoses and all orders for this visit:    1. Type 1 diabetes mellitus with hyperglycemia (Primary)  -     insulin aspart (NovoLOG FlexPen) 100 UNIT/ML solution pen-injector sc pen; INJECT 1 UNIT FOR 6 grams of carb plus sliding scale 1u 50/140. MAXIMUM DAILY DOSE 50 UNITS  Dispense: 45 mL; Refill: 0  -     insulin degludec (Tresiba FlexTouch) 100 UNIT/ML solution pen-injector injection; Inject 16 Units under the skin into the appropriate area as directed Daily. Inject 15 to 17 units under the skin in the morning e10.8  Dispense: 15 mL; Refill: 1  -     Hemoglobin A1c; Future  -     Comprehensive Metabolic Panel; Future  -     Microalbumin / Creatinine Urine Ratio - Urine, Clean Catch; Future  -     Lipid Panel; Future    2. Mixed hyperlipidemia  -     Hemoglobin A1c; Future  -     Comprehensive Metabolic Panel; Future  -     Microalbumin / Creatinine Urine Ratio - Urine, Clean Catch; Future  -     Lipid Panel; Future    Other orders  -     Discontinue: insulin degludec (Tresiba FlexTouch) 100 UNIT/ML solution pen-injector injection; Inject 16 Units under the skin into the appropriate area as directed Daily. Inject 15 to 17 units under the skin in the morning e10.8  Dispense: 15 mL; Refill: 1  -     Discontinue: insulin aspart (NovoLOG FlexPen) 100 UNIT/ML solution pen-injector sc pen; INJECT 1 UNIT FOR 6 grams of carb plus sliding scale 1u 50/140. MAXIMUM DAILY DOSE 50 UNITS  Dispense: 45 mL; Refill: 0  -      Glucagon (Gvoke HypoPen 2-Pack) 1 MG/0.2ML solution auto-injector; Inject 1 mg under the skin into the appropriate area as directed As Needed (hypoglycemia).  Dispense: 0.4 mL; Refill: 1             Follow Up   Return in about 3 months (around 9/19/2023).    A1c overall at goal  Still considering omnipod 5 with dexcom- not wanting it just yet  Repeat labs before next visit   Continue statin and ARB     Patient was given instructions and counseling regarding her condition or for health maintenance advice. Please see specific information pulled into the AVS if appropriate.     Estee Mckinney, APRN

## 2023-08-14 DIAGNOSIS — I10 ESSENTIAL HYPERTENSION: ICD-10-CM

## 2023-08-14 RX ORDER — AMLODIPINE BESYLATE 5 MG/1
TABLET ORAL
Qty: 90 TABLET | Refills: 1 | Status: SHIPPED | OUTPATIENT
Start: 2023-08-14

## 2023-08-20 DIAGNOSIS — F51.04 PSYCHOPHYSIOLOGICAL INSOMNIA: ICD-10-CM

## 2023-08-20 RX ORDER — TEMAZEPAM 30 MG/1
CAPSULE ORAL
Qty: 30 CAPSULE | Refills: 0 | Status: SHIPPED | OUTPATIENT
Start: 2023-08-20

## 2023-08-22 ENCOUNTER — OFFICE VISIT (OUTPATIENT)
Dept: ENDOCRINOLOGY | Age: 58
End: 2023-08-22
Payer: COMMERCIAL

## 2023-08-22 VITALS
DIASTOLIC BLOOD PRESSURE: 80 MMHG | SYSTOLIC BLOOD PRESSURE: 118 MMHG | HEIGHT: 65 IN | WEIGHT: 141 LBS | TEMPERATURE: 97.1 F | OXYGEN SATURATION: 96 % | HEART RATE: 60 BPM | BODY MASS INDEX: 23.49 KG/M2

## 2023-08-22 DIAGNOSIS — E10.65 TYPE 1 DIABETES MELLITUS WITH HYPERGLYCEMIA: Primary | Chronic | ICD-10-CM

## 2023-08-22 DIAGNOSIS — E16.2 HYPOGLYCEMIA: ICD-10-CM

## 2023-08-22 DIAGNOSIS — E78.2 MIXED HYPERLIPIDEMIA: ICD-10-CM

## 2023-08-22 PROCEDURE — 99214 OFFICE O/P EST MOD 30 MIN: CPT | Performed by: NURSE PRACTITIONER

## 2023-08-22 RX ORDER — INSULIN DEGLUDEC INJECTION 100 U/ML
16 INJECTION, SOLUTION SUBCUTANEOUS DAILY
Qty: 30 ML | Refills: 0 | Status: SHIPPED | OUTPATIENT
Start: 2023-08-22

## 2023-08-22 RX ORDER — GLUCAGON INJECTION, SOLUTION 1 MG/.2ML
1 INJECTION, SOLUTION SUBCUTANEOUS AS NEEDED
Qty: 0.4 ML | Refills: 1 | Status: SHIPPED | OUTPATIENT
Start: 2023-08-22

## 2023-08-22 NOTE — PROGRESS NOTES
"Chief Complaint  Diabetes (TYPE 1 DM)    Subjective        Ivon Ortega presents to [facility]  History of Present Illness    Going to be getting a new schedule   1130a start time instead of 330a     Can not afford pump    Type 1 dm    Diagnosed for about 15-20 years ago  Today in clinic pt reports being on Tresiba 14-16 units at bed time, novolog 1u per 6g/carbs u TIDAC, 1 unit for 50 over 140  Checks BG - 4 - 5 times x day   Last eye exam 8/2022  Dm neuropathy - yes  Episodes of hypoglycemia - yes, complicated, hypoglycemia unawareness   Lowest BS 20   On ARB and statin     7 day averages  High 428  Average 214  Low 69    14 day average   217  Low 39    30 days  187  Low 24    90 days high 509  Average 190  Low 24       Objective   Vital Signs:  /80   Pulse 60   Temp 97.1 øF (36.2 øC)   Ht 165.1 cm (65\")   Wt 64 kg (141 lb)   SpO2 96%   BMI 23.46 kg/mý   Estimated body mass index is 23.46 kg/mý as calculated from the following:    Height as of this encounter: 165.1 cm (65\").    Weight as of this encounter: 64 kg (141 lb).       BMI is within normal parameters. No other follow-up for BMI required.      Physical Exam  Vitals reviewed.   Constitutional:       General: She is not in acute distress.  HENT:      Head: Normocephalic and atraumatic.   Cardiovascular:      Rate and Rhythm: Normal rate.   Pulmonary:      Effort: Pulmonary effort is normal. No respiratory distress.   Musculoskeletal:         General: No signs of injury. Normal range of motion.      Cervical back: Normal range of motion and neck supple.   Skin:     General: Skin is warm and dry.   Neurological:      Mental Status: She is alert and oriented to person, place, and time. Mental status is at baseline.   Psychiatric:         Mood and Affect: Mood normal.         Behavior: Behavior normal.         Thought Content: Thought content normal.         Judgment: Judgment normal.        Result Review :  The " following data was reviewed by: FARIHA Casey on 08/22/2023:  Common labs          11/21/2022    09:27 1/11/2023    09:32 3/20/2023    08:52 3/20/2023    08:53   Common Labs   Glucose 102   165     BUN 9   9     Creatinine 0.79   0.77     Sodium 136   136     Potassium 4.2   4.0     Chloride 98   96     Calcium 9.7   9.9     Total Protein 6.6       Albumin 4.40       Total Bilirubin 0.3       Alkaline Phosphatase 101       AST (SGOT) 21       ALT (SGPT) 12       WBC  5.91   4.51    Hemoglobin  11.6   12.2    Hematocrit  34.3   35.6    Platelets  255   264    Total Cholesterol 189   169     Triglycerides 81   198     HDL Cholesterol 82   65     LDL Cholesterol  92   72     Hemoglobin A1C 7.70   7.50                    Assessment and Plan   Diagnoses and all orders for this visit:    1. Type 1 diabetes mellitus with hyperglycemia (Primary)  -     insulin degludec (Tresiba FlexTouch) 100 UNIT/ML solution pen-injector injection; Inject 16 Units under the skin into the appropriate area as directed Daily.  Dispense: 30 mL; Refill: 0  -     Hemoglobin A1c  -     Comprehensive Metabolic Panel  -     Microalbumin / Creatinine Urine Ratio - Urine, Clean Catch  -     Lipid Panel    2. Mixed hyperlipidemia  -     Hemoglobin A1c  -     Comprehensive Metabolic Panel  -     Microalbumin / Creatinine Urine Ratio - Urine, Clean Catch  -     Lipid Panel    3. Hypoglycemia    Other orders  -     Glucagon (Gvoke HypoPen 2-Pack) 1 MG/0.2ML solution auto-injector; Inject 1 mg under the skin into the appropriate area as directed As Needed (hypoglycemia).  Dispense: 0.4 mL; Refill: 1             Follow Up   No follow-ups on file.    Labs today  Get cgm from supply company   Continue statin and ARB  Hypoglycemia education- must keep glucagon on hand at work     Patient was given instructions and counseling regarding her condition or for health maintenance advice. Please see specific information pulled into the AVS if appropriate.        Estee Mckinney, APRN

## 2023-08-23 ENCOUNTER — TELEPHONE (OUTPATIENT)
Dept: ENDOCRINOLOGY | Age: 58
End: 2023-08-23
Payer: COMMERCIAL

## 2023-08-23 LAB
ALBUMIN SERPL-MCNC: 4.9 G/DL (ref 3.5–5.2)
ALBUMIN/CREAT UR: 17 MG/G CREAT (ref 0–29)
ALBUMIN/GLOB SERPL: 2.7 G/DL
ALP SERPL-CCNC: 104 U/L (ref 39–117)
ALT SERPL-CCNC: 19 U/L (ref 1–33)
AST SERPL-CCNC: 22 U/L (ref 1–32)
BILIRUB SERPL-MCNC: <0.2 MG/DL (ref 0–1.2)
BUN SERPL-MCNC: 10 MG/DL (ref 8–23)
BUN/CREAT SERPL: 13.5 (ref 7–25)
CALCIUM SERPL-MCNC: 10.2 MG/DL (ref 8.6–10.5)
CHLORIDE SERPL-SCNC: 95 MMOL/L (ref 98–107)
CHOLEST SERPL-MCNC: 174 MG/DL (ref 0–200)
CO2 SERPL-SCNC: 28.3 MMOL/L (ref 22–29)
CREAT SERPL-MCNC: 0.74 MG/DL (ref 0.57–1)
CREAT UR-MCNC: 21.1 MG/DL
EGFRCR SERPLBLD CKD-EPI 2021: 92.8 ML/MIN/1.73
GLOBULIN SER CALC-MCNC: 1.8 GM/DL
GLUCOSE SERPL-MCNC: 71 MG/DL (ref 65–99)
HBA1C MFR BLD: 7.7 % (ref 4.8–5.6)
HDLC SERPL-MCNC: 80 MG/DL (ref 40–60)
IMP & REVIEW OF LAB RESULTS: NORMAL
LDLC SERPL CALC-MCNC: 78 MG/DL (ref 0–100)
MICROALBUMIN UR-MCNC: 3.5 UG/ML
POTASSIUM SERPL-SCNC: 3.7 MMOL/L (ref 3.5–5.2)
PROT SERPL-MCNC: 6.7 G/DL (ref 6–8.5)
SODIUM SERPL-SCNC: 135 MMOL/L (ref 136–145)
TRIGL SERPL-MCNC: 86 MG/DL (ref 0–150)
VLDLC SERPL CALC-MCNC: 16 MG/DL (ref 5–40)

## 2023-08-23 NOTE — TELEPHONE ENCOUNTER
----- Message from FARIHA Casey sent at 8/22/2023  1:12 PM EDT -----  Send dexcom g7 to supply company

## 2023-08-23 NOTE — TELEPHONE ENCOUNTER
Dexcom order form ready to fax.     Called and spoke with patient that Dexcom order form has been sent.

## 2023-08-28 ENCOUNTER — TELEPHONE (OUTPATIENT)
Dept: ENDOCRINOLOGY | Age: 58
End: 2023-08-28
Payer: COMMERCIAL

## 2023-08-28 RX ORDER — ACYCLOVIR 400 MG/1
1 TABLET ORAL
Qty: 9 EACH | Refills: 1 | Status: SHIPPED | OUTPATIENT
Start: 2023-08-28

## 2023-08-28 NOTE — TELEPHONE ENCOUNTER
Hub staff attempted to follow warm transfer process and was unsuccessful     Caller: Ivon Ortega    Relationship to patient: Self    Best call back number: [phone]     Patient is needing: PT STATES THEY WANT THE DEXCOM SENT THROUGH [facility]  PT STATES THEY WANT ELAINE TO CALL HER BACK ABOUT HER DEXCOM. PLEASE CHECK AND ADVISE

## 2023-08-28 NOTE — TELEPHONE ENCOUNTER
8/28/23 Called and left patient a message regarding FMLA paperwork, it has been faxed.     Called patient to let her know she would have to come in to pay the charge for fmla being filled out.

## 2023-09-17 DIAGNOSIS — F51.04 PSYCHOPHYSIOLOGICAL INSOMNIA: ICD-10-CM

## 2023-09-18 RX ORDER — TEMAZEPAM 30 MG/1
CAPSULE ORAL
Qty: 30 CAPSULE | Refills: 0 | Status: SHIPPED | OUTPATIENT
Start: 2023-09-18

## 2023-09-18 RX ORDER — TRAZODONE HYDROCHLORIDE 100 MG/1
TABLET ORAL
Qty: 60 TABLET | Refills: 1 | Status: SHIPPED | OUTPATIENT
Start: 2023-09-18

## 2023-10-02 DIAGNOSIS — E10.65 TYPE 1 DIABETES MELLITUS WITH HYPERGLYCEMIA: Chronic | ICD-10-CM

## 2023-10-02 DIAGNOSIS — F41.9 ANXIETY: ICD-10-CM

## 2023-10-02 RX ORDER — DESVENLAFAXINE 100 MG/1
TABLET, EXTENDED RELEASE ORAL
Qty: 90 TABLET | Refills: 0 | Status: SHIPPED | OUTPATIENT
Start: 2023-10-02

## 2023-10-02 RX ORDER — INSULIN DEGLUDEC INJECTION 100 U/ML
16 INJECTION, SOLUTION SUBCUTANEOUS DAILY
Qty: 30 ML | Refills: 0 | Status: SHIPPED | OUTPATIENT
Start: 2023-10-02 | End: 2023-10-05 | Stop reason: SDUPTHER

## 2023-10-02 NOTE — TELEPHONE ENCOUNTER
Caller: LUANA TSE    Relationship: SELF    Best call back number: [phone]    Requested Prescriptions: NEEDS 2 BOXES NOT 1  Requested Prescriptions     Pending Prescriptions Disp Refills    insulin degludec (Tresiba FlexTouch) 100 UNIT/ML solution pen-injector injection 30 mL 0     Sig: Inject 16 Units under the skin into the appropriate area as directed Daily.        Pharmacy where request should be sent: [facility] - [address] [facility] AT [address] TO [facility] - [phone]  - [phone] FX     Last office visit with prescribing clinician: 8/22/2023   Last telemedicine visit with prescribing clinician: Visit date not found   Next office visit with prescribing clinician: 1/2/2024     Additional details provided by patient:     Does the patient have less than a 3 day supply:  [x] Yes  [] No    Would you like a call back once the refill request has been completed: [x] Yes [] No    If the office needs to give you a call back, can they leave a voicemail: [x] Yes [] No    Britni Gaytan Rep   10/02/23 13:17 EDT

## 2023-10-05 DIAGNOSIS — E10.65 TYPE 1 DIABETES MELLITUS WITH HYPERGLYCEMIA: Chronic | ICD-10-CM

## 2023-10-05 RX ORDER — INSULIN DEGLUDEC INJECTION 100 U/ML
20 INJECTION, SOLUTION SUBCUTANEOUS DAILY
Qty: 15 ML | Refills: 0 | Status: SHIPPED | OUTPATIENT
Start: 2023-10-05 | End: 2024-01-03

## 2023-10-05 NOTE — TELEPHONE ENCOUNTER
Rx Refill Note  Requested Prescriptions     Pending Prescriptions Disp Refills    insulin degludec (Tresiba FlexTouch) 100 UNIT/ML solution pen-injector injection 14.4 mL 0     Sig: Inject 16 Units under the skin into the appropriate area as directed Daily for 90 days.      Last office visit with prescribing clinician: 8/22/2023   Last telemedicine visit with prescribing clinician: Visit date not found   Next office visit with prescribing clinician: 1/2/2024                         Would you like a call back once the refill request has been completed: [] Yes [] No    If the office needs to give you a call back, can they leave a voicemail: [] Yes [] No    Elizabeth Silverman MA  10/05/23, 11:25 EDT

## 2023-10-06 NOTE — TELEPHONE ENCOUNTER
PATIENT IS CALLING BACK AND THE PRESCRIPTION SHOULD BE FOR 2 BOXES FOR 90 DAYS.  DAYS. CAN WE CALL [facility] AND MAKE SURE IT WAS SENT OVER CORRECT? PHONE # [phone]

## 2023-10-12 DIAGNOSIS — I10 ESSENTIAL HYPERTENSION: ICD-10-CM

## 2023-10-13 RX ORDER — LOSARTAN POTASSIUM AND HYDROCHLOROTHIAZIDE 25; 100 MG/1; MG/1
TABLET ORAL
Qty: 90 TABLET | Refills: 3 | Status: SHIPPED | OUTPATIENT
Start: 2023-10-13

## 2023-10-19 ENCOUNTER — TELEMEDICINE (OUTPATIENT)
Dept: PSYCHIATRY | Facility: CLINIC | Age: 58
End: 2023-10-19
Payer: COMMERCIAL

## 2023-10-19 DIAGNOSIS — F51.04 PSYCHOPHYSIOLOGICAL INSOMNIA: Chronic | ICD-10-CM

## 2023-10-19 DIAGNOSIS — F41.9 ANXIETY: ICD-10-CM

## 2023-10-19 DIAGNOSIS — F41.1 GAD (GENERALIZED ANXIETY DISORDER): Primary | Chronic | ICD-10-CM

## 2023-10-19 RX ORDER — TEMAZEPAM 30 MG/1
30 CAPSULE ORAL NIGHTLY PRN
Qty: 30 CAPSULE | Refills: 1 | Status: SHIPPED | OUTPATIENT
Start: 2023-10-19
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
## 2023-12-11 ENCOUNTER — TELEPHONE (OUTPATIENT)
Dept: ENDOCRINOLOGY | Age: 58
End: 2023-12-11
Payer: COMMERCIAL

## 2023-12-11 DIAGNOSIS — I10 ESSENTIAL HYPERTENSION: ICD-10-CM

## 2023-12-11 DIAGNOSIS — E10.65 TYPE 1 DIABETES MELLITUS WITH HYPERGLYCEMIA: Primary | ICD-10-CM

## 2023-12-11 RX ORDER — AMLODIPINE BESYLATE 5 MG/1
5 TABLET ORAL DAILY
Qty: 90 TABLET | Refills: 1 | Status: SHIPPED | OUTPATIENT
Start: 2023-12-11

## 2023-12-12 DIAGNOSIS — E10.65 TYPE 1 DIABETES MELLITUS WITH HYPERGLYCEMIA: Chronic | ICD-10-CM

## 2023-12-13 RX ORDER — INSULIN ASPART 100 [IU]/ML
INJECTION, SOLUTION INTRAVENOUS; SUBCUTANEOUS
Qty: 45 ML | Refills: 0 | OUTPATIENT
Start: 2023-12-13

## 2023-12-13 NOTE — TELEPHONE ENCOUNTER
Rx Refill Note  Requested Prescriptions     Pending Prescriptions Disp Refills    insulin aspart (NovoLOG FlexPen) 100 UNIT/ML solution pen-injector sc pen [Pharmacy Med Name: NOVOLOG F/P  PEN 100U/ML] 45 mL 0     Sig: INJECT 1 UNIT FOR 6 GRAMS OF CARBOHYDRATES PLUS SLIDING SCALE 1 UNIT 50/140. MAXIMUM DAILY DOSE 50 UNITS.      Last office visit with prescribing clinician: 8/22/2023   Last telemedicine visit with prescribing clinician: Visit date not found   Next office visit with prescribing clinician: Visit date not found                         Would you like a call back once the refill request has been completed: [] Yes [] No    If the office needs to give you a call back, can they leave a voicemail: [] Yes [] No    Marisa Saenz  12/13/23, 08:05 EST

## 2023-12-27 DIAGNOSIS — F51.04 PSYCHOPHYSIOLOGICAL INSOMNIA: Chronic | ICD-10-CM

## 2023-12-28 RX ORDER — TEMAZEPAM 30 MG/1
30 CAPSULE ORAL NIGHTLY PRN
Qty: 30 CAPSULE | Refills: 1 | Status: SHIPPED | OUTPATIENT
Start: 2023-12-28

## 2023-12-29 DIAGNOSIS — F41.9 ANXIETY: ICD-10-CM

## 2023-12-29 RX ORDER — DESVENLAFAXINE 100 MG/1
TABLET, EXTENDED RELEASE ORAL
Qty: 90 TABLET | Refills: 0 | OUTPATIENT
Start: 2023-12-29

## 2024-01-16 ENCOUNTER — OFFICE VISIT (OUTPATIENT)
Dept: INTERNAL MEDICINE | Facility: CLINIC | Age: 59
End: 2024-01-16
Payer: COMMERCIAL

## 2024-01-16 VITALS
DIASTOLIC BLOOD PRESSURE: 82 MMHG | RESPIRATION RATE: 18 BRPM | BODY MASS INDEX: 23.49 KG/M2 | OXYGEN SATURATION: 98 % | WEIGHT: 141 LBS | SYSTOLIC BLOOD PRESSURE: 118 MMHG | HEIGHT: 65 IN | HEART RATE: 88 BPM

## 2024-01-16 DIAGNOSIS — F41.1 GAD (GENERALIZED ANXIETY DISORDER): Chronic | ICD-10-CM

## 2024-01-16 DIAGNOSIS — I10 ESSENTIAL HYPERTENSION: Primary | Chronic | ICD-10-CM

## 2024-01-16 DIAGNOSIS — E10.65 TYPE 1 DIABETES MELLITUS WITH HYPERGLYCEMIA: Chronic | ICD-10-CM

## 2024-01-16 DIAGNOSIS — E78.2 MIXED HYPERLIPIDEMIA: Chronic | ICD-10-CM

## 2024-01-16 PROCEDURE — 99214 OFFICE O/P EST MOD 30 MIN: CPT | Performed by: FAMILY MEDICINE

## 2024-01-16 NOTE — PROGRESS NOTES
Pcp contacted me to give patient a sample insulin pen    Patient has not been seen since August and cancelled all follow ups with me     We were notified she plans to follow with Dr. Zuniga  Patient would need to have UTD visit with a current provider to provide any other samples or refill request

## 2024-01-16 NOTE — PROGRESS NOTES
Chief Complaint  Follow-up and Hypertension    Subjective        Ivon Ortega presents to [facility] PRIMARY CARE  History of Present Illness    She is following up for chronic medical conditions including hypertension, hyperlipidemia, anxiety disorder, insulin-dependent type 1 diabetic (following with endocrinology and heading to see Dr. Zuniga at [facility]).  No new complaints regarding these conditions.  Blood pressure controlled in the office today.  She had labs completed in August 2023 and I reviewed the results as below.      Current Outpatient Medications:     amLODIPine (NORVASC) 5 MG tablet, TAKE 1 TABLET BY MOUTH DAILY, Disp: 90 tablet, Rfl: 1    atorvastatin (LIPITOR) 20 MG tablet, TAKE ONE TABLET BY MOUTH DAILY, Disp: 90 tablet, Rfl: 1    B-D UF III MINI PEN NEEDLES 31G X 5 MM misc, USE TO INJECT INSULIN 4 TIMES DAILY, Disp: 400 each, Rfl: 3    Blood Pressure Monitoring (5 SERIES BP MONITOR) device, , Disp: , Rfl:     Continuous Blood Gluc Sensor (Dexcom G7 Sensor) misc, 1 each Every 10 (Ten) Days., Disp: 9 each, Rfl: 1    desvenlafaxine (PRISTIQ) 100 MG 24 hr tablet, TAKE ONE TABLET BY MOUTH DAILY, Disp: 90 tablet, Rfl: 0    Glucagon (Gvoke HypoPen 2-Pack) 1 MG/0.2ML solution auto-injector, Inject 1 mg under the skin into the appropriate area as directed As Needed (hypoglycemia)., Disp: 0.4 mL, Rfl: 1    glucose blood test strip, USE TO TEST BLOOD SUGAR 6 TIMES DAILY  ONE TOUCH ULTRA TEST STRIPS, Disp: 600 each, Rfl: 3    ibandronate (BONIVA) 150 MG tablet, , Disp: , Rfl:     ibuprofen (ADVIL,MOTRIN) 800 MG tablet, TAKE ONE TABLET BY MOUTH EVERY 8 HOURS AS NEEDED FOR MILD OR MODERATE PAIN FOR UP TO TEN DAYS., Disp: 30 tablet, Rfl: 0    insulin aspart (NovoLOG FlexPen) 100 UNIT/ML solution pen-injector sc pen, INJECT 1 UNIT FOR 6 grams of carb plus sliding scale 1u 50/140. MAXIMUM DAILY DOSE 50 UNITS, Disp: 45 mL, Rfl: 0    insulin degludec (TRESIBA FLEXTOUCH) 100 UNIT/ML solution  "pen-injector injection, Inject 17 Units under the skin into the appropriate area as directed Daily., Disp: , Rfl:     losartan-hydrochlorothiazide (HYZAAR) 100-25 MG per tablet, TAKE ONE TABLET BY MOUTH DAILY, Disp: 90 tablet, Rfl: 3    MILK THISTLE PO, Take  by mouth Daily., Disp: , Rfl:     Multiple Vitamins-Minerals (Multivitamin Adult) chewable tablet, Chew., Disp: , Rfl:     ONETOUCH VERIO test strip, USE TO TEST BLOOD SUGAR 6 TIMES DAILY, Disp: 600 each, Rfl: 3    Oxymetazoline HCl (Nasal Spray) 0.05 % solution,  2 Spray, Nasal, Spray, BID, # 15 mL, 0 Refill(s), Disp: , Rfl:     temazepam (RESTORIL) 30 MG capsule, Take 1 capsule by mouth At Night As Needed for Sleep., Disp: 30 capsule, Rfl: 1      Objective   Vital Signs:  /82 (BP Location: Left arm, Patient Position: Sitting, Cuff Size: Small Adult)   Pulse 88   Resp 18   Ht 165.1 cm (65\")   Wt 64 kg (141 lb)   SpO2 98%   BMI 23.46 kg/m²   Estimated body mass index is 23.46 kg/m² as calculated from the following:    Height as of this encounter: 165.1 cm (65\").    Weight as of this encounter: 64 kg (141 lb).       BMI is within normal parameters. No other follow-up for BMI required.      Physical Exam  Vitals and nursing note reviewed.   Constitutional:       General: She is not in acute distress.     Appearance: Normal appearance.   Cardiovascular:      Rate and Rhythm: Normal rate and regular rhythm.      Heart sounds: Normal heart sounds. No murmur heard.  Pulmonary:      Effort: Pulmonary effort is normal.      Breath sounds: Normal breath sounds.   Neurological:      Mental Status: She is alert.        Result Review :    The following data was reviewed by: Les Soto MD on 01/16/2024:  Common labs          3/20/2023    08:52 3/20/2023    08:53 8/22/2023    13:46   Common Labs   Glucose 165   71    BUN 9   10    Creatinine 0.77   0.74    Sodium 136   135    Potassium 4.0   3.7    Chloride 96   95    Calcium 9.9   10.2    Total Protein   " 6.7    Albumin   4.9    Total Bilirubin   <0.2    Alkaline Phosphatase   104    AST (SGOT)   22    ALT (SGPT)   19    WBC  4.51     Hemoglobin  12.2     Hematocrit  35.6     Platelets  264     Total Cholesterol 169   174    Triglycerides 198   86    HDL Cholesterol 65   80    LDL Cholesterol  72   78    Hemoglobin A1C 7.50   7.70    Microalbumin, Urine   3.5                   Assessment and Plan     Diagnoses and all orders for this visit:    1. Essential hypertension (Primary)    2. Mixed hyperlipidemia    3. Type 1 diabetes mellitus with hyperglycemia    4. LAVINIA (generalized anxiety disorder)        Clinically stable chronic conditions as above.  Continue all medications as above.  Labs reviewed as above.  She is seeing Dr. Efrain lafleur for labs.           Follow Up     Return in about 6 months (around 7/16/2024) for Annual physical.  Patient was given instructions and counseling regarding her condition or for health maintenance advice. Please see specific information pulled into the AVS if appropriate.

## 2024-01-16 NOTE — PATIENT INSTRUCTIONS
"MyChart/Telephone call/Lab results Tips:    MyChart and telephone calls are a useful part of our patient care program and is a way for us to communicate lab results and for you to request refills.  Not all medical questions are appropriate for MyChart such as new medical issues that require taking a history, performing an exam, getting labs/studies or researching medical questions needed to be addressed in the office.  Similarly, telephone calls should not be used for new problems requiring an evaluation as well.    Examples of medical issues that are APPROPRIATE for MyChart and telephone calls:  -Follow-up on problems we have already addressed in a visit such as home testing results, blood pressure readings, glucose readings  -Questions that can be answered with a simple \"yes\" or \"no\"    Communication that is NOT APPROPRIATE for MyChart:  -New problems, serious problems or urgent problems.  Urgent matters should be addressed by phone for advice, in the office, urgent care or the ER.  -Requesting Covid or bacterial infection treatments: These types of problems require an evaluation.    -Yowza messages are not email.  Staff will check messages each weekday.  We strive for a 48-hour turnaround on messaging.    -[employer] is not for private issues.  Messages are received first by our office staff.    Please allow up to 7 business days for lab results to be sent through Yowza to you before contacting your provider.  Sometimes we are waiting for results to get back from the lab and also your provider needs time to analyze them thoroughly before making recommendations.  While the internet has great resources, it is not a substitute for interpreting lab results.    We also ask that you not send [employer] messages and telephone calls regarding the same issue simultaneously.  This slows down the process of returning your call/message and confuses staff.    Be mindful that MyChart messages and telephone calls become part of " your permanent medical record.    Your provider cannot access your Dezidehart.  It is a service which communicates with the EHR (Electronic Health Record).  Sometimes there are errors in Tolerx that staff and providers cannot see and these errors are not part of your EHR.  Vaccines and screening reminders have been frequently incorrect in Dezidehart.    Per Dr. Soto, when sending messages via Tolerx, please be as concise and accurate as possible.  Much of our time each day is utilized looking up information for patients only to find out that it was another practice or pharmacy responsible for the issue.  I will be the first to admit that I am not fond of Tolerx.  Much information is inaccurate or difficult/time consuming to look up in our system.  When using the messaging, please use it for short and simple questions.  Anything further than that should go through the phone system or better yet addressed in a visit.      We appreciate your respect for the limitations and boundaries of [employer]olerx and the telephone answering service.    Thank you,  Dr. Soto

## 2024-01-17 ENCOUNTER — OFFICE VISIT (OUTPATIENT)
Dept: ENDOCRINOLOGY | Age: 59
End: 2024-01-17
Payer: COMMERCIAL

## 2024-01-17 ENCOUNTER — TELEPHONE (OUTPATIENT)
Dept: ENDOCRINOLOGY | Age: 59
End: 2024-01-17

## 2024-01-17 VITALS — WEIGHT: 142.6 LBS | HEART RATE: 93 BPM | OXYGEN SATURATION: 97 % | BODY MASS INDEX: 23.73 KG/M2 | TEMPERATURE: 96.8 F

## 2024-01-17 DIAGNOSIS — E11.42 DIABETIC PERIPHERAL NEUROPATHY ASSOCIATED WITH TYPE 2 DIABETES MELLITUS: ICD-10-CM

## 2024-01-17 DIAGNOSIS — E10.65 TYPE 1 DIABETES MELLITUS WITH HYPERGLYCEMIA: Primary | Chronic | ICD-10-CM

## 2024-01-17 PROCEDURE — 99214 OFFICE O/P EST MOD 30 MIN: CPT | Performed by: NURSE PRACTITIONER

## 2024-01-17 PROCEDURE — 95251 CONT GLUC MNTR ANALYSIS I&R: CPT | Performed by: NURSE PRACTITIONER

## 2024-01-17 RX ORDER — INSULIN ASPART 100 [IU]/ML
INJECTION, SOLUTION INTRAVENOUS; SUBCUTANEOUS
Qty: 45 ML | Refills: 0 | Status: SHIPPED | OUTPATIENT
Start: 2024-01-17 | End: 2024-01-19 | Stop reason: SDUPTHER

## 2024-01-17 RX ORDER — HYDROCODONE BITARTRATE AND ACETAMINOPHEN 5; 325 MG/1; MG/1
TABLET ORAL AS NEEDED
COMMUNITY
Start: 2023-12-14

## 2024-01-17 RX ORDER — FLURBIPROFEN SODIUM 0.3 MG/ML
SOLUTION/ DROPS OPHTHALMIC
Qty: 400 EACH | Refills: 3 | Status: SHIPPED | OUTPATIENT
Start: 2024-01-17

## 2024-01-17 NOTE — TELEPHONE ENCOUNTER
Hub staff attempted to follow warm transfer process and was unsuccessful     Caller: Ivon Ortega    Relationship to patient: Self    Best call back number: [phone]    Patient is needing: PATIENT WAS WORKED INTO THE SCHEDULE FOR TODAY AND HER CAR WILL NOT START. SHE'S WANTING TO KNOW IF SHE CAN BE WORKED IN POSSIBLY TOMORROW 1/18/24.

## 2024-01-17 NOTE — PROGRESS NOTES
"Chief Complaint  Diabetes    Subjective        Ivon Ortega presents to [facility] ENDOCRINOLOGY  History of Present Illness    Broke her foot a month ago, in a boot   Has not been able to work   She is struggling with depression which is making it harder to control her BS     She was also given insulin pen fills (not insulin pens) by Dr. Zuniga and she has not been able to safely dose insulin causing both hyper and hypoglycemia   She has been using an omnipod syringe to try and guess how many units she is taking of the rapid insulin    She is having up to 4 episodes a month lasting up to one day causing her to miss work     Cgm review  Avg glu 172  Gmi 7.4  5% very low  7% low  40% time in range  33% high  15% very high     Type 1 dm , 15-20 years   DM regimen: Tresiba 16 units at bed time, novolog 1u per 5g/carbs TIDAC, 1 unit for 50 over 140  (+)Dm neuropathy  Episodes of hypoglycemia - yes  On ARB and statin        Objective   Vital Signs:  Pulse 93   Temp 96.8 °F (36 °C) (Temporal)   Wt 64.7 kg (142 lb 9.6 oz)   SpO2 97%   BMI 23.73 kg/m²   Estimated body mass index is 23.73 kg/m² as calculated from the following:    Height as of 1/16/24: 165.1 cm (65\").    Weight as of this encounter: 64.7 kg (142 lb 9.6 oz).       BMI is within normal parameters. No other follow-up for BMI required.      Physical Exam  Vitals reviewed.   Constitutional:       General: She is not in acute distress.  HENT:      Head: Normocephalic and atraumatic.   Cardiovascular:      Rate and Rhythm: Normal rate.   Pulmonary:      Effort: Pulmonary effort is normal. No respiratory distress.   Musculoskeletal:         General: No signs of injury. Normal range of motion.      Cervical back: Normal range of motion and neck supple.   Skin:     General: Skin is warm and dry.   Neurological:      Mental Status: She is alert and oriented to person, place, and time. Mental status is at baseline.   Psychiatric:         Mood " and Affect: Mood normal.         Behavior: Behavior normal.         Thought Content: Thought content normal.         Judgment: Judgment normal.        Result Review :    The following data was reviewed by: FARIHA Casey on 01/17/2024:  Common labs          3/20/2023    08:52 3/20/2023    08:53 8/22/2023    13:46   Common Labs   Glucose 165   71    BUN 9   10    Creatinine 0.77   0.74    Sodium 136   135    Potassium 4.0   3.7    Chloride 96   95    Calcium 9.9   10.2    Total Protein   6.7    Albumin   4.9    Total Bilirubin   <0.2    Alkaline Phosphatase   104    AST (SGOT)   22    ALT (SGPT)   19    WBC  4.51     Hemoglobin  12.2     Hematocrit  35.6     Platelets  264     Total Cholesterol 169   174    Triglycerides 198   86    HDL Cholesterol 65   80    LDL Cholesterol  72   78    Hemoglobin A1C 7.50   7.70    Microalbumin, Urine   3.5                   Assessment and Plan     Diagnoses and all orders for this visit:    1. Type 1 diabetes mellitus with hyperglycemia (Primary)  -     NovoLOG FlexPen 100 UNIT/ML solution pen-injector sc pen; INJECT 1 UNIT FOR  grams of carb plus sliding scale 1u 50/140. MAXIMUM DAILY DOSE 50 UNITS  Dispense: 45 mL; Refill: 0  -     B-D UF III MINI PEN NEEDLES 31G X 5 MM misc; USE TO INJECT INSULIN 4 TIMES DAILY  Dispense: 400 each; Refill: 3  -     insulin degludec (TRESIBA FLEXTOUCH) 100 UNIT/ML solution pen-injector injection; Inject 20 Units under the skin into the appropriate area as directed Daily for 90 days.  Dispense: 18 mL; Refill: 0  -     Hemoglobin A1c  -     Comprehensive Metabolic Panel  -     Lipid Panel  -     Microalbumin / Creatinine Urine Ratio - Urine, Clean Catch    2. Diabetic peripheral neuropathy associated with type 2 diabetes mellitus             Follow Up     Return in about 3 months (around 4/17/2024).    Insulin pens refilled  New FMLA papers filled out   Cgm reviewed, high risk of compilcations   Labs today    Patient was given instructions and  counseling regarding her condition or for health maintenance advice. Please see specific information pulled into the AVS if appropriate.     FARIHA Casey

## 2024-01-18 LAB
ALBUMIN SERPL-MCNC: 4.7 G/DL (ref 3.5–5.2)
ALBUMIN/CREAT UR: 89 MG/G CREAT (ref 0–29)
ALBUMIN/GLOB SERPL: 2.2 G/DL
ALP SERPL-CCNC: 130 U/L (ref 39–117)
ALT SERPL-CCNC: 19 U/L (ref 1–33)
AST SERPL-CCNC: 19 U/L (ref 1–32)
BILIRUB SERPL-MCNC: 0.3 MG/DL (ref 0–1.2)
BUN SERPL-MCNC: 11 MG/DL (ref 8–23)
BUN/CREAT SERPL: 16.9 (ref 7–25)
CALCIUM SERPL-MCNC: 10.6 MG/DL (ref 8.6–10.5)
CHLORIDE SERPL-SCNC: 96 MMOL/L (ref 98–107)
CHOLEST SERPL-MCNC: 194 MG/DL (ref 0–200)
CO2 SERPL-SCNC: 28.1 MMOL/L (ref 22–29)
CREAT SERPL-MCNC: 0.65 MG/DL (ref 0.57–1)
CREAT UR-MCNC: 27.9 MG/DL
EGFRCR SERPLBLD CKD-EPI 2021: 100.9 ML/MIN/1.73
GLOBULIN SER CALC-MCNC: 2.1 GM/DL
GLUCOSE SERPL-MCNC: 88 MG/DL (ref 65–99)
HBA1C MFR BLD: 7.6 % (ref 4.8–5.6)
HDLC SERPL-MCNC: 83 MG/DL (ref 40–60)
IMP & REVIEW OF LAB RESULTS: NORMAL
LDLC SERPL CALC-MCNC: 90 MG/DL (ref 0–100)
MICROALBUMIN UR-MCNC: 24.9 UG/ML
POTASSIUM SERPL-SCNC: 4 MMOL/L (ref 3.5–5.2)
PROT SERPL-MCNC: 6.8 G/DL (ref 6–8.5)
SODIUM SERPL-SCNC: 137 MMOL/L (ref 136–145)
TRIGL SERPL-MCNC: 125 MG/DL (ref 0–150)
VLDLC SERPL CALC-MCNC: 21 MG/DL (ref 5–40)

## 2024-01-19 ENCOUNTER — TELEPHONE (OUTPATIENT)
Dept: ENDOCRINOLOGY | Age: 59
End: 2024-01-19
Payer: COMMERCIAL

## 2024-01-19 DIAGNOSIS — E10.65 TYPE 1 DIABETES MELLITUS WITH HYPERGLYCEMIA: Chronic | ICD-10-CM

## 2024-01-19 RX ORDER — INSULIN ASPART 100 [IU]/ML
INJECTION, SOLUTION INTRAVENOUS; SUBCUTANEOUS
Qty: 45 ML | Refills: 0 | Status: SHIPPED | OUTPATIENT
Start: 2024-01-19

## 2024-01-22 ENCOUNTER — TELEPHONE (OUTPATIENT)
Dept: INTERNAL MEDICINE | Facility: CLINIC | Age: 59
End: 2024-01-22
Payer: COMMERCIAL

## 2024-01-22 NOTE — TELEPHONE ENCOUNTER
Caller: [facility] - FABRICE Gutierrez - [address] AT Portal to Registered [facility] Sites - [phone]  - [phone] FX    Relationship: Pharmacy    Best call back number: SCREENING PASSED/FAILED 01/22/2024    Which medication are you concerned about: NOVOLOG     What are your concerns: PATIENT'S PHARMACY NEEDS FOR DR. LEE TO CONTACT THE CLINICAL TEAM AT [facility] TO GIVE CORRECT INSTRUCTIONS ON PATIENT'S NOVOLOG. THEIR PHONE NUMBER [phone] OPTION 2

## 2024-01-24 DIAGNOSIS — E10.65 TYPE 1 DIABETES MELLITUS WITH HYPERGLYCEMIA: Chronic | ICD-10-CM

## 2024-01-24 RX ORDER — INSULIN ASPART 100 [IU]/ML
INJECTION, SOLUTION INTRAVENOUS; SUBCUTANEOUS
Qty: 45 ML | Refills: 0 | Status: SHIPPED | OUTPATIENT
Start: 2024-01-24

## 2024-02-20 ENCOUNTER — TELEPHONE (OUTPATIENT)
Dept: ENDOCRINOLOGY | Age: 59
End: 2024-02-20
Payer: COMMERCIAL

## 2024-02-20 RX ORDER — ACYCLOVIR 400 MG/1
1 TABLET ORAL
Qty: 9 EACH | Refills: 1 | Status: SHIPPED | OUTPATIENT
Start: 2024-02-20

## 2024-02-20 NOTE — TELEPHONE ENCOUNTER
Incoming Refill Request      Medication requested (name and dose): DEXCOM G7 SENSOR    Pharmacy where request should be sent: [facility]    Additional details provided by patient: REFILL    Best call back number: [phone]    Does the patient have less than a 3 day supply:  [] Yes  [] No    Luna Curry, Micked Rep  02/20/24, 09:30 EST

## 2024-02-22 DIAGNOSIS — F41.9 ANXIETY: ICD-10-CM

## 2024-02-22 DIAGNOSIS — F51.04 PSYCHOPHYSIOLOGICAL INSOMNIA: Chronic | ICD-10-CM

## 2024-02-22 RX ORDER — TEMAZEPAM 30 MG/1
30 CAPSULE ORAL NIGHTLY PRN
Qty: 30 CAPSULE | Refills: 1 | Status: SHIPPED | OUTPATIENT
Start: 2024-02-22

## 2024-02-22 RX ORDER — DESVENLAFAXINE 100 MG/1
100 TABLET, EXTENDED RELEASE ORAL DAILY
Qty: 90 TABLET | Refills: 1 | Status: SHIPPED | OUTPATIENT
Start: 2024-02-22

## 2024-03-27 ENCOUNTER — TELEMEDICINE (OUTPATIENT)
Dept: PSYCHIATRY | Facility: CLINIC | Age: 59
End: 2024-03-27
Payer: COMMERCIAL

## 2024-03-27 DIAGNOSIS — F41.9 ANXIETY: ICD-10-CM

## 2024-03-27 DIAGNOSIS — F33.0 MILD EPISODE OF RECURRENT MAJOR DEPRESSIVE DISORDER: Primary | Chronic | ICD-10-CM

## 2024-03-27 DIAGNOSIS — F41.1 GAD (GENERALIZED ANXIETY DISORDER): Chronic | ICD-10-CM

## 2024-03-27 DIAGNOSIS — F51.04 PSYCHOPHYSIOLOGICAL INSOMNIA: ICD-10-CM

## 2024-03-27 RX ORDER — DESVENLAFAXINE SUCCINATE 50 MG/1
50 TABLET, EXTENDED RELEASE ORAL DAILY
Qty: 90 TABLET | Refills: 1 | Status: SHIPPED | OUTPATIENT
Start: 2024-03-27
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
## 2024-04-10 ENCOUNTER — PRIOR AUTHORIZATION (OUTPATIENT)
Dept: PSYCHIATRY | Facility: CLINIC | Age: 59
End: 2024-04-10
Payer: COMMERCIAL

## 2024-04-11 RX ORDER — ATORVASTATIN CALCIUM 20 MG/1
20 TABLET, FILM COATED ORAL DAILY
Qty: 90 TABLET | Refills: 1 | Status: SHIPPED | OUTPATIENT
Start: 2024-04-11

## 2024-04-11 NOTE — TELEPHONE ENCOUNTER
Caller: Jordan Ivon SAVANNA    Relationship: Self    Best call back number: [phone]     Requested Prescriptions:   Requested Prescriptions     Pending Prescriptions Disp Refills    atorvastatin (LIPITOR) 20 MG tablet 90 tablet 1     Sig: Take 1 tablet by mouth Daily.        Pharmacy where request should be sent: [facility] [id number] [address] [facility] - [phone]  - [phone] FX     Last office visit with prescribing clinician: 1/16/2024   Last telemedicine visit with prescribing clinician: Visit date not found   Next office visit with prescribing clinician: 8/23/2024     Additional details provided by patient: PATIENT STATES SHE IS OUT OF MEDICATION. SHE HAS BEEN OUT FOR TWO DAYS. PATIENT GET 3 MONTHS.     Does the patient have less than a 3 day supply:  [] Yes  [x] No    Would you like a call back once the refill request has been completed: [] Yes [x] No    If the office needs to give you a call back, can they leave a voicemail: [] Yes [x] No    Britni Alexis Rep   04/11/24 10:31 EDT

## 2024-04-23 DIAGNOSIS — F51.04 PSYCHOPHYSIOLOGICAL INSOMNIA: Chronic | ICD-10-CM

## 2024-04-24 RX ORDER — TEMAZEPAM 30 MG/1
CAPSULE ORAL
Qty: 30 CAPSULE | Refills: 1 | Status: SHIPPED | OUTPATIENT
Start: 2024-04-24

## 2024-05-22 DIAGNOSIS — I10 ESSENTIAL HYPERTENSION: ICD-10-CM

## 2024-05-22 RX ORDER — AMLODIPINE BESYLATE 5 MG/1
5 TABLET ORAL DAILY
Qty: 90 TABLET | Refills: 1 | Status: SHIPPED | OUTPATIENT
Start: 2024-05-22

## 2024-05-23 ENCOUNTER — OFFICE VISIT (OUTPATIENT)
Dept: ENDOCRINOLOGY | Age: 59
End: 2024-05-23
Payer: COMMERCIAL

## 2024-05-23 VITALS
TEMPERATURE: 98.6 F | HEART RATE: 87 BPM | BODY MASS INDEX: 23.46 KG/M2 | DIASTOLIC BLOOD PRESSURE: 78 MMHG | HEIGHT: 65 IN | WEIGHT: 140.8 LBS | OXYGEN SATURATION: 98 % | SYSTOLIC BLOOD PRESSURE: 124 MMHG

## 2024-05-23 DIAGNOSIS — E10.65 TYPE 1 DIABETES MELLITUS WITH HYPERGLYCEMIA: Primary | ICD-10-CM

## 2024-05-23 DIAGNOSIS — E78.2 MIXED HYPERLIPIDEMIA: Chronic | ICD-10-CM

## 2024-05-23 DIAGNOSIS — E11.42 DIABETIC PERIPHERAL NEUROPATHY ASSOCIATED WITH TYPE 2 DIABETES MELLITUS: ICD-10-CM

## 2024-05-23 PROCEDURE — 99214 OFFICE O/P EST MOD 30 MIN: CPT | Performed by: NURSE PRACTITIONER

## 2024-05-23 NOTE — PROGRESS NOTES
"Chief Complaint  Diabetes    Subjective        Ivon Ortega presents to [facility] ENDOCRINOLOGY  History of Present Illness    Saw Dr. Zuniga 1/2024 also who adjust morning carb ratio from 5 to 4.8    Type 1 dm , 15-20 years   DM regimen: Tresiba 16 units at bed time, novolog 1u per 5g/carbs TIDAC, 1 unit for 50 over 140  Known diabetic complications: neuropathy  Renal: ARB   CV: statin   Glucagon: GVOKE  Diabetic eye exam: will arrange f/u, overdue   She is still struggling with depression and is tearful in office today- no reports of self harm or harming others, has upcoming therapy appt      Cgm review  Avg glu 155  Gmi n/a  2% very low  5% low  56% time in range  28% high  9% very high         Objective   Vital Signs:  /78 (BP Location: Left arm, Patient Position: Sitting)   Pulse 87   Temp 98.6 °F (37 °C) (Oral)   Ht 165.1 cm (65\")   Wt 63.9 kg (140 lb 12.8 oz)   SpO2 98%   BMI 23.43 kg/m²   Estimated body mass index is 23.43 kg/m² as calculated from the following:    Height as of this encounter: 165.1 cm (65\").    Weight as of this encounter: 63.9 kg (140 lb 12.8 oz).       BMI is within normal parameters. No other follow-up for BMI required.      Physical Exam  Vitals reviewed.   Constitutional:       General: She is not in acute distress.  HENT:      Head: Normocephalic and atraumatic.   Cardiovascular:      Rate and Rhythm: Normal rate.   Pulmonary:      Effort: Pulmonary effort is normal. No respiratory distress.   Musculoskeletal:         General: No signs of injury. Normal range of motion.      Cervical back: Normal range of motion and neck supple.   Skin:     General: Skin is warm and dry.   Neurological:      Mental Status: She is alert and oriented to person, place, and time. Mental status is at baseline.   Psychiatric:         Mood and Affect: Mood normal.         Behavior: Behavior normal.         Thought Content: Thought content normal.         Judgment: Judgment " normal.        Result Review :    The following data was reviewed by: FARIHA Casey on 05/23/2024:  Common labs          8/22/2023    13:46 1/17/2024    14:17   Common Labs   Glucose 71  88    BUN 10  11    Creatinine 0.74  0.65    Sodium 135  137    Potassium 3.7  4.0    Chloride 95  96    Calcium 10.2  10.6    Total Protein 6.7  6.8    Albumin 4.9  4.7    Total Bilirubin <0.2  0.3    Alkaline Phosphatase 104  130    AST (SGOT) 22  19    ALT (SGPT) 19  19    Total Cholesterol 174  194    Triglycerides 86  125    HDL Cholesterol 80  83    LDL Cholesterol  78  90    Hemoglobin A1C 7.70  7.60    Microalbumin, Urine 3.5  24.9                   Assessment and Plan     Diagnoses and all orders for this visit:    1. Type 1 diabetes mellitus with hyperglycemia (Primary)  -     Hemoglobin A1c  -     Lipid Panel  -     Basic Metabolic Panel    2. Diabetic peripheral neuropathy associated with type 2 diabetes mellitus    3. Mixed hyperlipidemia             Follow Up     No follow-ups on file.    Cgm reviewed, still with hypoglycemia- fasting, reduce tresiba to 15u  Continue ARB and statin   Labs today  Ok to use carb ratio of 5 and not 4.8    Patient was given instructions and counseling regarding her condition or for health maintenance advice. Please see specific information pulled into the AVS if appropriate.     FARIHA Casey

## 2024-05-24 LAB
BUN SERPL-MCNC: 9 MG/DL (ref 8–23)
BUN/CREAT SERPL: 14.1 (ref 7–25)
CALCIUM SERPL-MCNC: 9.3 MG/DL (ref 8.6–10.5)
CHLORIDE SERPL-SCNC: 98 MMOL/L (ref 98–107)
CHOLEST SERPL-MCNC: 175 MG/DL (ref 0–200)
CO2 SERPL-SCNC: 25.8 MMOL/L (ref 22–29)
CREAT SERPL-MCNC: 0.64 MG/DL (ref 0.57–1)
EGFRCR SERPLBLD CKD-EPI 2021: 100.7 ML/MIN/1.73
GLUCOSE SERPL-MCNC: 205 MG/DL (ref 65–99)
HBA1C MFR BLD: 7.8 % (ref 4.8–5.6)
HDLC SERPL-MCNC: 79 MG/DL (ref 40–60)
IMP & REVIEW OF LAB RESULTS: NORMAL
LDLC SERPL CALC-MCNC: 84 MG/DL (ref 0–100)
POTASSIUM SERPL-SCNC: 4.2 MMOL/L (ref 3.5–5.2)
SODIUM SERPL-SCNC: 136 MMOL/L (ref 136–145)
TRIGL SERPL-MCNC: 64 MG/DL (ref 0–150)
VLDLC SERPL CALC-MCNC: 12 MG/DL (ref 5–40)

## 2024-06-13 ENCOUNTER — PATIENT MESSAGE (OUTPATIENT)
Dept: ENDOCRINOLOGY | Age: 59
End: 2024-06-13
Payer: COMMERCIAL

## 2024-06-19 ENCOUNTER — TELEMEDICINE (OUTPATIENT)
Dept: PSYCHIATRY | Facility: CLINIC | Age: 59
End: 2024-06-19
Payer: COMMERCIAL

## 2024-06-19 DIAGNOSIS — F51.04 PSYCHOPHYSIOLOGICAL INSOMNIA: Chronic | ICD-10-CM

## 2024-06-19 DIAGNOSIS — F33.0 MILD EPISODE OF RECURRENT MAJOR DEPRESSIVE DISORDER: Chronic | ICD-10-CM

## 2024-06-19 DIAGNOSIS — F41.1 GAD (GENERALIZED ANXIETY DISORDER): Primary | Chronic | ICD-10-CM

## 2024-06-19 RX ORDER — DESVENLAFAXINE 100 MG/1
100 TABLET, EXTENDED RELEASE ORAL DAILY
Qty: 90 TABLET | Refills: 1 | Status: SHIPPED | OUTPATIENT
Start: 2024-06-19

## 2024-06-19 RX ORDER — MIRTAZAPINE 15 MG/1
15 TABLET, FILM COATED ORAL NIGHTLY
Qty: 30 TABLET | Refills: 2 | Status: SHIPPED | OUTPATIENT
Start: 2024-06-19 | End: 2025-06-19
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
## 2024-06-20 PROBLEM — F41.1 GAD (GENERALIZED ANXIETY DISORDER): Status: ACTIVE | Noted: 2024-06-20

## 2024-06-20 RX ORDER — TEMAZEPAM 30 MG/1
30 CAPSULE ORAL NIGHTLY PRN
Qty: 30 CAPSULE | Refills: 1 | Status: SHIPPED | OUTPATIENT
Start: 2024-06-20

## 2024-08-07 RX ORDER — ACYCLOVIR 400 MG/1
TABLET ORAL
Qty: 9 EACH | Refills: 0 | Status: SHIPPED | OUTPATIENT
Start: 2024-08-07

## 2024-08-07 NOTE — TELEPHONE ENCOUNTER
Rx Refill Note  Requested Prescriptions     Pending Prescriptions Disp Refills    Continuous Glucose Sensor (Dexcom G7 Sensor) misc [Pharmacy Med Name: DEXCOM G7 MIS SENSOR] 9 each 1     Sig: USE 1 SENSOR EVERY 10 DAYS      Last office visit with prescribing clinician: 5/23/2024   Last telemedicine visit with prescribing clinician: Visit date not found   Next office visit with prescribing clinician: 11/19/2024                         Would you like a call back once the refill request has been completed: [] Yes [] No    If the office needs to give you a call back, can they leave a voicemail: [] Yes [] No    Elizabeth Silverman MA  08/07/24, 10:36 EDT

## 2024-08-10 ENCOUNTER — TELEPHONE (OUTPATIENT)
Dept: INTERNAL MEDICINE | Facility: CLINIC | Age: 59
End: 2024-08-10
Payer: COMMERCIAL

## 2024-08-10 ENCOUNTER — DOCUMENTATION (OUTPATIENT)
Dept: INTERNAL MEDICINE | Facility: CLINIC | Age: 59
End: 2024-08-10
Payer: COMMERCIAL

## 2024-08-10 NOTE — TELEPHONE ENCOUNTER
Please call patient and let her know that she is seeing me on my last day in the office.  If she is needing any labwork from me, we will need to do it ahead of the visit.  I doubt she needs any because she sees endocrine but thought I would check.  If there was something else she needed ran,  I will need to know where she wants to have the lab drawn.

## 2025-02-26 NOTE — TELEPHONE ENCOUNTER
PATIENT CALLED NEEDS BOTH NOVOLOG AND [employer] 90 DAY SUPPLY.    SENT TO:  [facility]     normal/cranial nerves II-XII intact/sensation intact
